# Patient Record
Sex: FEMALE | Race: WHITE | Employment: OTHER | URBAN - METROPOLITAN AREA
[De-identification: names, ages, dates, MRNs, and addresses within clinical notes are randomized per-mention and may not be internally consistent; named-entity substitution may affect disease eponyms.]

---

## 2017-01-26 RX ORDER — ALLOPURINOL 100 MG/1
TABLET ORAL
Qty: 30 TABLET | Refills: 2 | Status: SHIPPED | OUTPATIENT
Start: 2017-01-26 | End: 2017-06-27 | Stop reason: SDUPTHER

## 2017-05-26 ENCOUNTER — OFFICE VISIT (OUTPATIENT)
Dept: FAMILY MEDICINE CLINIC | Age: 82
End: 2017-05-26
Payer: MEDICARE

## 2017-05-26 VITALS
BODY MASS INDEX: 28.51 KG/M2 | HEIGHT: 61 IN | DIASTOLIC BLOOD PRESSURE: 70 MMHG | SYSTOLIC BLOOD PRESSURE: 122 MMHG | HEART RATE: 104 BPM | TEMPERATURE: 98.1 F | WEIGHT: 151 LBS

## 2017-05-26 DIAGNOSIS — E78.5 HYPERLIPIDEMIA, UNSPECIFIED HYPERLIPIDEMIA TYPE: ICD-10-CM

## 2017-05-26 DIAGNOSIS — Z91.81 AT HIGH RISK FOR FALLS: ICD-10-CM

## 2017-05-26 DIAGNOSIS — M1A.0720 IDIOPATHIC CHRONIC GOUT OF LEFT FOOT WITHOUT TOPHUS: ICD-10-CM

## 2017-05-26 DIAGNOSIS — I10 ESSENTIAL HYPERTENSION: Primary | ICD-10-CM

## 2017-05-26 PROCEDURE — 1123F ACP DISCUSS/DSCN MKR DOCD: CPT | Performed by: FAMILY MEDICINE

## 2017-05-26 PROCEDURE — G8427 DOCREV CUR MEDS BY ELIG CLIN: HCPCS | Performed by: FAMILY MEDICINE

## 2017-05-26 PROCEDURE — 1090F PRES/ABSN URINE INCON ASSESS: CPT | Performed by: FAMILY MEDICINE

## 2017-05-26 PROCEDURE — 4040F PNEUMOC VAC/ADMIN/RCVD: CPT | Performed by: FAMILY MEDICINE

## 2017-05-26 PROCEDURE — G8420 CALC BMI NORM PARAMETERS: HCPCS | Performed by: FAMILY MEDICINE

## 2017-05-26 PROCEDURE — 99213 OFFICE O/P EST LOW 20 MIN: CPT | Performed by: FAMILY MEDICINE

## 2017-05-26 PROCEDURE — G8399 PT W/DXA RESULTS DOCUMENT: HCPCS | Performed by: FAMILY MEDICINE

## 2017-05-26 PROCEDURE — 1036F TOBACCO NON-USER: CPT | Performed by: FAMILY MEDICINE

## 2017-05-26 ASSESSMENT — PATIENT HEALTH QUESTIONNAIRE - PHQ9
SUM OF ALL RESPONSES TO PHQ9 QUESTIONS 1 & 2: 0
SUM OF ALL RESPONSES TO PHQ QUESTIONS 1-9: 0
1. LITTLE INTEREST OR PLEASURE IN DOING THINGS: 0
2. FEELING DOWN, DEPRESSED OR HOPELESS: 0

## 2017-06-19 ENCOUNTER — TELEPHONE (OUTPATIENT)
Dept: FAMILY MEDICINE CLINIC | Age: 82
End: 2017-06-19

## 2017-06-27 RX ORDER — SPIRONOLACTONE 25 MG/1
25 TABLET ORAL DAILY
Qty: 90 TABLET | Refills: 1 | Status: ON HOLD | OUTPATIENT
Start: 2017-06-27 | End: 2017-09-01 | Stop reason: HOSPADM

## 2017-06-27 RX ORDER — LOSARTAN POTASSIUM 100 MG/1
100 TABLET ORAL DAILY
Qty: 90 TABLET | Refills: 1 | Status: ON HOLD | OUTPATIENT
Start: 2017-06-27 | End: 2017-09-01 | Stop reason: HOSPADM

## 2017-06-27 RX ORDER — ATORVASTATIN CALCIUM 20 MG/1
20 TABLET, FILM COATED ORAL DAILY
Qty: 90 TABLET | Refills: 1 | Status: SHIPPED | OUTPATIENT
Start: 2017-06-27 | End: 2017-11-08 | Stop reason: SDUPTHER

## 2017-06-27 RX ORDER — OYSTER SHELL CALCIUM WITH VITAMIN D 500; 200 MG/1; [IU]/1
1 TABLET, FILM COATED ORAL DAILY
Qty: 90 TABLET | Refills: 1 | Status: SHIPPED | OUTPATIENT
Start: 2017-06-27 | End: 2017-09-15 | Stop reason: ALTCHOICE

## 2017-06-27 RX ORDER — FUROSEMIDE 20 MG/1
20 TABLET ORAL 2 TIMES DAILY
Qty: 180 TABLET | Refills: 1 | Status: ON HOLD | OUTPATIENT
Start: 2017-06-27 | End: 2017-09-01 | Stop reason: HOSPADM

## 2017-06-27 RX ORDER — ALLOPURINOL 100 MG/1
TABLET ORAL
Qty: 90 TABLET | Refills: 1 | Status: SHIPPED | OUTPATIENT
Start: 2017-06-27 | End: 2017-11-08 | Stop reason: SDUPTHER

## 2017-06-27 RX ORDER — NICOTINE POLACRILEX 4 MG/1
20 GUM, CHEWING ORAL DAILY
Qty: 90 TABLET | Refills: 1 | Status: SHIPPED | OUTPATIENT
Start: 2017-06-27 | End: 2017-11-08 | Stop reason: SDUPTHER

## 2017-06-27 RX ORDER — CLONIDINE HYDROCHLORIDE 0.2 MG/1
0.2 TABLET ORAL 2 TIMES DAILY
Qty: 180 TABLET | Refills: 1 | Status: SHIPPED | OUTPATIENT
Start: 2017-06-27 | End: 2017-11-08 | Stop reason: SDUPTHER

## 2017-08-26 ENCOUNTER — HOSPITAL ENCOUNTER (OUTPATIENT)
Age: 82
Discharge: HOME | DRG: 683 | End: 2017-08-26
Payer: MEDICARE

## 2017-08-26 DIAGNOSIS — M1A.0720 IDIOPATHIC CHRONIC GOUT OF LEFT FOOT WITHOUT TOPHUS: ICD-10-CM

## 2017-08-26 DIAGNOSIS — I10 ESSENTIAL HYPERTENSION: ICD-10-CM

## 2017-08-26 DIAGNOSIS — E78.5 HYPERLIPIDEMIA, UNSPECIFIED HYPERLIPIDEMIA TYPE: ICD-10-CM

## 2017-08-26 LAB
ALBUMIN SERPL-MCNC: 4.5 G/DL (ref 3.5–5.2)
ALBUMIN/GLOBULIN RATIO: ABNORMAL (ref 1–2.5)
ALP BLD-CCNC: 81 U/L (ref 35–104)
ALT SERPL-CCNC: 16 U/L (ref 5–33)
ANION GAP SERPL CALCULATED.3IONS-SCNC: 29 MMOL/L (ref 9–17)
AST SERPL-CCNC: 29 U/L
BILIRUB SERPL-MCNC: 0.65 MG/DL (ref 0.3–1.2)
BUN BLDV-MCNC: 80 MG/DL (ref 8–23)
BUN/CREAT BLD: ABNORMAL (ref 9–20)
CALCIUM SERPL-MCNC: 10.2 MG/DL (ref 8.6–10.4)
CHLORIDE BLD-SCNC: 94 MMOL/L (ref 98–107)
CHOLESTEROL/HDL RATIO: 1.4
CHOLESTEROL: 147 MG/DL
CO2: 15 MMOL/L (ref 20–31)
CREAT SERPL-MCNC: 4.75 MG/DL (ref 0.5–0.9)
GFR AFRICAN AMERICAN: 11 ML/MIN
GFR NON-AFRICAN AMERICAN: 9 ML/MIN
GFR SERPL CREATININE-BSD FRML MDRD: ABNORMAL ML/MIN/{1.73_M2}
GFR SERPL CREATININE-BSD FRML MDRD: ABNORMAL ML/MIN/{1.73_M2}
GLUCOSE BLD-MCNC: 125 MG/DL (ref 70–99)
HDLC SERPL-MCNC: 107 MG/DL
LDL CHOLESTEROL: 24 MG/DL (ref 0–130)
POTASSIUM SERPL-SCNC: 3.7 MMOL/L (ref 3.7–5.3)
SODIUM BLD-SCNC: 138 MMOL/L (ref 135–144)
TOTAL PROTEIN: 7.4 G/DL (ref 6.4–8.3)
TRIGL SERPL-MCNC: 82 MG/DL
URIC ACID: 3.8 MG/DL (ref 2.4–5.7)
VLDLC SERPL CALC-MCNC: NORMAL MG/DL (ref 1–30)

## 2017-08-26 PROCEDURE — 84550 ASSAY OF BLOOD/URIC ACID: CPT

## 2017-08-26 PROCEDURE — 80061 LIPID PANEL: CPT

## 2017-08-26 PROCEDURE — 80053 COMPREHEN METABOLIC PANEL: CPT

## 2017-08-26 PROCEDURE — 36415 COLL VENOUS BLD VENIPUNCTURE: CPT

## 2017-08-28 ENCOUNTER — HOSPITAL ENCOUNTER (INPATIENT)
Age: 82
LOS: 4 days | Discharge: HOME | DRG: 683 | End: 2017-09-01
Attending: EMERGENCY MEDICINE | Admitting: INTERNAL MEDICINE
Payer: MEDICARE

## 2017-08-28 ENCOUNTER — TELEPHONE (OUTPATIENT)
Dept: FAMILY MEDICINE CLINIC | Age: 82
End: 2017-08-28

## 2017-08-28 DIAGNOSIS — N17.9 ACUTE KIDNEY INJURY (HCC): Primary | ICD-10-CM

## 2017-08-28 LAB
-: ABNORMAL
ABSOLUTE EOS #: 0 K/UL (ref 0–0.4)
ABSOLUTE LYMPH #: 0.8 K/UL (ref 1–4.8)
ABSOLUTE MONO #: 0.4 K/UL (ref 0.1–1.3)
AMORPHOUS: ABNORMAL
ANION GAP SERPL CALCULATED.3IONS-SCNC: 25 MMOL/L (ref 9–17)
BACTERIA: ABNORMAL
BASOPHILS # BLD: 1 %
BASOPHILS ABSOLUTE: 0 K/UL (ref 0–0.2)
BILIRUBIN URINE: NEGATIVE
BUN BLDV-MCNC: 91 MG/DL (ref 8–23)
BUN/CREAT BLD: ABNORMAL (ref 9–20)
CALCIUM IONIZED: 1.37 MMOL/L (ref 1.13–1.33)
CALCIUM SERPL-MCNC: 10.7 MG/DL (ref 8.6–10.4)
CASTS UA: ABNORMAL /LPF
CHLORIDE BLD-SCNC: 98 MMOL/L (ref 98–107)
CHLORIDE, UR: 77 MMOL/L
CO2: 15 MMOL/L (ref 20–31)
COLOR: YELLOW
COMMENT UA: ABNORMAL
CREAT SERPL-MCNC: 5.63 MG/DL (ref 0.5–0.9)
CREATININE URINE: 32.3 MG/DL (ref 28–217)
CRYSTALS, UA: ABNORMAL /HPF
DIFFERENTIAL TYPE: ABNORMAL
EOSINOPHIL,URINE: NORMAL
EOSINOPHILS RELATIVE PERCENT: 1 %
EPITHELIAL CELLS UA: ABNORMAL /HPF
FREE KAPPA/LAMBDA RATIO: 1 (ref 0.26–1.65)
GFR AFRICAN AMERICAN: 9 ML/MIN
GFR NON-AFRICAN AMERICAN: 7 ML/MIN
GFR SERPL CREATININE-BSD FRML MDRD: ABNORMAL ML/MIN/{1.73_M2}
GFR SERPL CREATININE-BSD FRML MDRD: ABNORMAL ML/MIN/{1.73_M2}
GLUCOSE BLD-MCNC: 96 MG/DL (ref 70–99)
GLUCOSE URINE: NEGATIVE
HCT VFR BLD CALC: 32.2 % (ref 36–46)
HEMOGLOBIN: 10.6 G/DL (ref 12–16)
KAPPA FREE LIGHT CHAINS QNT: 4.08 MG/DL (ref 0.37–1.94)
KETONES, URINE: NEGATIVE
LAMBDA FREE LIGHT CHAINS QNT: 4.1 MG/DL (ref 0.57–2.63)
LEUKOCYTE ESTERASE, URINE: ABNORMAL
LYMPHOCYTES # BLD: 17 %
MCH RBC QN AUTO: 32.9 PG (ref 26–34)
MCHC RBC AUTO-ENTMCNC: 32.9 G/DL (ref 31–37)
MCV RBC AUTO: 99.9 FL (ref 80–100)
MONOCYTES # BLD: 9 %
MUCUS: ABNORMAL
NITRITE, URINE: NEGATIVE
OTHER OBSERVATIONS UA: ABNORMAL
PDW BLD-RTO: 17.2 % (ref 11.5–14.9)
PH UA: 6 (ref 5–8)
PLATELET # BLD: 181 K/UL (ref 150–450)
PLATELET ESTIMATE: ABNORMAL
PMV BLD AUTO: 9.4 FL (ref 6–12)
POTASSIUM SERPL-SCNC: 5.1 MMOL/L (ref 3.7–5.3)
PROTEIN UA: NEGATIVE
PTH INTACT: 39.76 PG/ML (ref 15–65)
RBC # BLD: 3.23 M/UL (ref 4–5.2)
RBC # BLD: ABNORMAL 10*6/UL
RBC UA: ABNORMAL /HPF
RENAL EPITHELIAL, UA: ABNORMAL /HPF
SEG NEUTROPHILS: 72 %
SEGMENTED NEUTROPHILS ABSOLUTE COUNT: 3.6 K/UL (ref 1.3–9.1)
SODIUM BLD-SCNC: 138 MMOL/L (ref 135–144)
SODIUM,UR: 94 MMOL/L
SPECIFIC GRAVITY UA: 1.01 (ref 1–1.03)
TOTAL PROTEIN, URINE: 6 MG/DL
TRICHOMONAS: ABNORMAL
TURBIDITY: CLEAR
URINE HGB: NEGATIVE
URINE TOTAL PROTEIN CREATININE RATIO: 0.19 (ref 0–0.2)
UROBILINOGEN, URINE: NORMAL
VITAMIN D 25-HYDROXY: 77.4 NG/ML (ref 30–100)
WBC # BLD: 4.9 K/UL (ref 3.5–11)
WBC # BLD: ABNORMAL 10*3/UL
WBC UA: ABNORMAL /HPF
YEAST: ABNORMAL

## 2017-08-28 PROCEDURE — 84156 ASSAY OF PROTEIN URINE: CPT

## 2017-08-28 PROCEDURE — 84300 ASSAY OF URINE SODIUM: CPT

## 2017-08-28 PROCEDURE — 99284 EMERGENCY DEPT VISIT MOD MDM: CPT

## 2017-08-28 PROCEDURE — 84165 PROTEIN E-PHORESIS SERUM: CPT

## 2017-08-28 PROCEDURE — 36415 COLL VENOUS BLD VENIPUNCTURE: CPT

## 2017-08-28 PROCEDURE — 84166 PROTEIN E-PHORESIS/URINE/CSF: CPT

## 2017-08-28 PROCEDURE — 99223 1ST HOSP IP/OBS HIGH 75: CPT | Performed by: INTERNAL MEDICINE

## 2017-08-28 PROCEDURE — 2580000003 HC RX 258: Performed by: INTERNAL MEDICINE

## 2017-08-28 PROCEDURE — 2580000003 HC RX 258: Performed by: EMERGENCY MEDICINE

## 2017-08-28 PROCEDURE — 82330 ASSAY OF CALCIUM: CPT

## 2017-08-28 PROCEDURE — 82570 ASSAY OF URINE CREATININE: CPT

## 2017-08-28 PROCEDURE — 80048 BASIC METABOLIC PNL TOTAL CA: CPT

## 2017-08-28 PROCEDURE — 82306 VITAMIN D 25 HYDROXY: CPT

## 2017-08-28 PROCEDURE — 82436 ASSAY OF URINE CHLORIDE: CPT

## 2017-08-28 PROCEDURE — 83970 ASSAY OF PARATHORMONE: CPT

## 2017-08-28 PROCEDURE — 85025 COMPLETE CBC W/AUTO DIFF WBC: CPT

## 2017-08-28 PROCEDURE — 2060000000 HC ICU INTERMEDIATE R&B

## 2017-08-28 PROCEDURE — 84155 ASSAY OF PROTEIN SERUM: CPT

## 2017-08-28 PROCEDURE — 83883 ASSAY NEPHELOMETRY NOT SPEC: CPT

## 2017-08-28 PROCEDURE — 87205 SMEAR GRAM STAIN: CPT

## 2017-08-28 PROCEDURE — 81001 URINALYSIS AUTO W/SCOPE: CPT

## 2017-08-28 RX ORDER — BISACODYL 10 MG
10 SUPPOSITORY, RECTAL RECTAL DAILY PRN
Status: DISCONTINUED | OUTPATIENT
Start: 2017-08-28 | End: 2017-09-01 | Stop reason: HOSPADM

## 2017-08-28 RX ORDER — SODIUM CHLORIDE 450 MG/100ML
INJECTION, SOLUTION INTRAVENOUS CONTINUOUS
Status: DISCONTINUED | OUTPATIENT
Start: 2017-08-28 | End: 2017-08-29

## 2017-08-28 RX ORDER — 0.9 % SODIUM CHLORIDE 0.9 %
1000 INTRAVENOUS SOLUTION INTRAVENOUS ONCE
Status: COMPLETED | OUTPATIENT
Start: 2017-08-28 | End: 2017-08-28

## 2017-08-28 RX ORDER — SODIUM CHLORIDE 9 MG/ML
INJECTION, SOLUTION INTRAVENOUS CONTINUOUS
Status: DISCONTINUED | OUTPATIENT
Start: 2017-08-28 | End: 2017-08-29

## 2017-08-28 RX ORDER — SODIUM CHLORIDE 0.9 % (FLUSH) 0.9 %
10 SYRINGE (ML) INJECTION PRN
Status: DISCONTINUED | OUTPATIENT
Start: 2017-08-28 | End: 2017-09-01 | Stop reason: HOSPADM

## 2017-08-28 RX ORDER — DOCUSATE SODIUM 100 MG/1
100 CAPSULE, LIQUID FILLED ORAL 2 TIMES DAILY
Status: DISCONTINUED | OUTPATIENT
Start: 2017-08-28 | End: 2017-09-01 | Stop reason: HOSPADM

## 2017-08-28 RX ORDER — HEPARIN SODIUM 5000 [USP'U]/ML
5000 INJECTION, SOLUTION INTRAVENOUS; SUBCUTANEOUS EVERY 8 HOURS SCHEDULED
Status: DISCONTINUED | OUTPATIENT
Start: 2017-08-28 | End: 2017-09-01 | Stop reason: HOSPADM

## 2017-08-28 RX ORDER — SODIUM CHLORIDE 0.9 % (FLUSH) 0.9 %
10 SYRINGE (ML) INJECTION EVERY 12 HOURS SCHEDULED
Status: DISCONTINUED | OUTPATIENT
Start: 2017-08-28 | End: 2017-09-01 | Stop reason: HOSPADM

## 2017-08-28 RX ORDER — ONDANSETRON 2 MG/ML
4 INJECTION INTRAMUSCULAR; INTRAVENOUS EVERY 6 HOURS PRN
Status: DISCONTINUED | OUTPATIENT
Start: 2017-08-28 | End: 2017-09-01 | Stop reason: HOSPADM

## 2017-08-28 RX ORDER — CLONIDINE HYDROCHLORIDE 0.1 MG/1
0.2 TABLET ORAL 2 TIMES DAILY
Status: DISCONTINUED | OUTPATIENT
Start: 2017-08-28 | End: 2017-09-01 | Stop reason: HOSPADM

## 2017-08-28 RX ORDER — ACETAMINOPHEN 325 MG/1
650 TABLET ORAL EVERY 4 HOURS PRN
Status: DISCONTINUED | OUTPATIENT
Start: 2017-08-28 | End: 2017-09-01 | Stop reason: HOSPADM

## 2017-08-28 RX ORDER — M-VIT,TX,IRON,MINS/CALC/FOLIC 27MG-0.4MG
1 TABLET ORAL DAILY
Status: ON HOLD | COMMUNITY
End: 2017-09-01 | Stop reason: HOSPADM

## 2017-08-28 RX ADMIN — SODIUM CHLORIDE: 9 INJECTION, SOLUTION INTRAVENOUS at 18:58

## 2017-08-28 RX ADMIN — SODIUM CHLORIDE 1000 ML: 9 INJECTION, SOLUTION INTRAVENOUS at 16:30

## 2017-08-28 ASSESSMENT — PAIN SCALES - GENERAL
PAINLEVEL_OUTOF10: 0
PAINLEVEL_OUTOF10: 0

## 2017-08-28 ASSESSMENT — PAIN SCALES - WONG BAKER: WONGBAKER_NUMERICALRESPONSE: 0

## 2017-08-29 ENCOUNTER — APPOINTMENT (OUTPATIENT)
Dept: ULTRASOUND IMAGING | Age: 82
DRG: 683 | End: 2017-08-29
Payer: MEDICARE

## 2017-08-29 LAB
ANION GAP SERPL CALCULATED.3IONS-SCNC: 20 MMOL/L (ref 9–17)
BUN BLDV-MCNC: 78 MG/DL (ref 8–23)
BUN/CREAT BLD: ABNORMAL (ref 9–20)
CALCIUM SERPL-MCNC: 10 MG/DL (ref 8.6–10.4)
CHLORIDE BLD-SCNC: 105 MMOL/L (ref 98–107)
CO2: 17 MMOL/L (ref 20–31)
COMPLEMENT C3: 92 MG/DL (ref 90–180)
COMPLEMENT C4: 25 MG/DL (ref 10–40)
CREAT SERPL-MCNC: 4.4 MG/DL (ref 0.5–0.9)
FERRITIN: 204 UG/L (ref 13–150)
GFR AFRICAN AMERICAN: 12 ML/MIN
GFR NON-AFRICAN AMERICAN: 10 ML/MIN
GFR SERPL CREATININE-BSD FRML MDRD: ABNORMAL ML/MIN/{1.73_M2}
GFR SERPL CREATININE-BSD FRML MDRD: ABNORMAL ML/MIN/{1.73_M2}
GLUCOSE BLD-MCNC: 78 MG/DL (ref 70–99)
IRON SATURATION: 40 % (ref 20–55)
IRON: 95 UG/DL (ref 37–145)
MAGNESIUM: 1.7 MG/DL (ref 1.6–2.6)
PHOSPHORUS: 4.9 MG/DL (ref 2.6–4.5)
POTASSIUM SERPL-SCNC: 4.9 MMOL/L (ref 3.7–5.3)
SODIUM BLD-SCNC: 142 MMOL/L (ref 135–144)
TOTAL CK: 164 U/L (ref 26–192)
TOTAL IRON BINDING CAPACITY: 237 UG/DL (ref 250–450)
UNSATURATED IRON BINDING CAPACITY: 142 UG/DL (ref 112–347)

## 2017-08-29 PROCEDURE — 82550 ASSAY OF CK (CPK): CPT

## 2017-08-29 PROCEDURE — 99232 SBSQ HOSP IP/OBS MODERATE 35: CPT | Performed by: INTERNAL MEDICINE

## 2017-08-29 PROCEDURE — 2580000003 HC RX 258: Performed by: INTERNAL MEDICINE

## 2017-08-29 PROCEDURE — 83540 ASSAY OF IRON: CPT

## 2017-08-29 PROCEDURE — 2500000003 HC RX 250 WO HCPCS: Performed by: INTERNAL MEDICINE

## 2017-08-29 PROCEDURE — 36415 COLL VENOUS BLD VENIPUNCTURE: CPT

## 2017-08-29 PROCEDURE — 6370000000 HC RX 637 (ALT 250 FOR IP): Performed by: NURSE PRACTITIONER

## 2017-08-29 PROCEDURE — 82728 ASSAY OF FERRITIN: CPT

## 2017-08-29 PROCEDURE — 2060000000 HC ICU INTERMEDIATE R&B

## 2017-08-29 PROCEDURE — 76770 US EXAM ABDO BACK WALL COMP: CPT

## 2017-08-29 PROCEDURE — 6360000002 HC RX W HCPCS: Performed by: NURSE PRACTITIONER

## 2017-08-29 PROCEDURE — 83550 IRON BINDING TEST: CPT

## 2017-08-29 PROCEDURE — 83735 ASSAY OF MAGNESIUM: CPT

## 2017-08-29 PROCEDURE — 84100 ASSAY OF PHOSPHORUS: CPT

## 2017-08-29 PROCEDURE — 80048 BASIC METABOLIC PNL TOTAL CA: CPT

## 2017-08-29 PROCEDURE — 86160 COMPLEMENT ANTIGEN: CPT

## 2017-08-29 RX ADMIN — HEPARIN SODIUM 5000 UNITS: 5000 INJECTION, SOLUTION INTRAVENOUS; SUBCUTANEOUS at 05:58

## 2017-08-29 RX ADMIN — SODIUM BICARBONATE: 84 INJECTION, SOLUTION INTRAVENOUS at 11:25

## 2017-08-29 RX ADMIN — CLONIDINE HYDROCHLORIDE 0.2 MG: 0.1 TABLET ORAL at 00:09

## 2017-08-29 RX ADMIN — CLONIDINE HYDROCHLORIDE 0.2 MG: 0.1 TABLET ORAL at 20:13

## 2017-08-29 RX ADMIN — SODIUM BICARBONATE: 84 INJECTION, SOLUTION INTRAVENOUS at 20:08

## 2017-08-29 RX ADMIN — SODIUM CHLORIDE: 9 INJECTION, SOLUTION INTRAVENOUS at 03:06

## 2017-08-29 RX ADMIN — HEPARIN SODIUM 5000 UNITS: 5000 INJECTION, SOLUTION INTRAVENOUS; SUBCUTANEOUS at 00:09

## 2017-08-29 RX ADMIN — HEPARIN SODIUM 5000 UNITS: 5000 INJECTION, SOLUTION INTRAVENOUS; SUBCUTANEOUS at 13:01

## 2017-08-29 RX ADMIN — HEPARIN SODIUM 5000 UNITS: 5000 INJECTION, SOLUTION INTRAVENOUS; SUBCUTANEOUS at 20:15

## 2017-08-29 RX ADMIN — CLONIDINE HYDROCHLORIDE 0.2 MG: 0.1 TABLET ORAL at 09:22

## 2017-08-29 RX ADMIN — Medication 10 ML: at 21:17

## 2017-08-30 LAB
ABSOLUTE EOS #: 0.22 K/UL (ref 0–0.4)
ABSOLUTE LYMPH #: 1.55 K/UL (ref 1–4.8)
ABSOLUTE MONO #: 0.47 K/UL (ref 0.1–1.3)
ALBUMIN (CALCULATED): 4.6 G/DL (ref 3.2–5.2)
ALBUMIN PERCENT: 69 % (ref 45–65)
ALPHA 1 PERCENT: 3 % (ref 3–6)
ALPHA 2 PERCENT: 10 % (ref 6–13)
ALPHA-1-GLOBULIN: 0.2 G/DL (ref 0.1–0.4)
ALPHA-2-GLOBULIN: 0.7 G/DL (ref 0.5–0.9)
ANION GAP SERPL CALCULATED.3IONS-SCNC: 16 MMOL/L (ref 9–17)
BASOPHILS # BLD: 0 %
BASOPHILS ABSOLUTE: 0 K/UL (ref 0–0.2)
BETA GLOBULIN: 0.6 G/DL (ref 0.5–1.1)
BETA PERCENT: 9 % (ref 11–19)
BUN BLDV-MCNC: 56 MG/DL (ref 8–23)
BUN/CREAT BLD: ABNORMAL (ref 9–20)
CALCIUM SERPL-MCNC: 9.2 MG/DL (ref 8.6–10.4)
CHLORIDE BLD-SCNC: 95 MMOL/L (ref 98–107)
CO2: 22 MMOL/L (ref 20–31)
CREAT SERPL-MCNC: 3.37 MG/DL (ref 0.5–0.9)
DIFFERENTIAL TYPE: ABNORMAL
EOSINOPHILS RELATIVE PERCENT: 5 %
GAMMA GLOBULIN %: 9 % (ref 9–20)
GAMMA GLOBULIN: 0.6 G/DL (ref 0.5–1.5)
GFR AFRICAN AMERICAN: 16 ML/MIN
GFR NON-AFRICAN AMERICAN: 13 ML/MIN
GFR SERPL CREATININE-BSD FRML MDRD: ABNORMAL ML/MIN/{1.73_M2}
GFR SERPL CREATININE-BSD FRML MDRD: ABNORMAL ML/MIN/{1.73_M2}
GLUCOSE BLD-MCNC: 110 MG/DL (ref 70–99)
HCT VFR BLD CALC: 27.9 % (ref 36–46)
HEMOGLOBIN: 9.3 G/DL (ref 12–16)
LYMPHOCYTES # BLD: 36 %
MCH RBC QN AUTO: 33.3 PG (ref 26–34)
MCHC RBC AUTO-ENTMCNC: 33.3 G/DL (ref 31–37)
MCV RBC AUTO: 100.1 FL (ref 80–100)
MONOCYTES # BLD: 11 %
MORPHOLOGY: ABNORMAL
P E INTERPRETATION, U: NORMAL
PATHOLOGIST: ABNORMAL
PATHOLOGIST: NORMAL
PDW BLD-RTO: 16.9 % (ref 11.5–14.9)
PLATELET # BLD: 165 K/UL (ref 150–450)
PLATELET ESTIMATE: ABNORMAL
PMV BLD AUTO: 8.8 FL (ref 6–12)
POTASSIUM SERPL-SCNC: 4.1 MMOL/L (ref 3.7–5.3)
PROTEIN ELECTROPHORESIS, SERUM: ABNORMAL
RBC # BLD: 2.78 M/UL (ref 4–5.2)
RBC # BLD: ABNORMAL 10*6/UL
SEG NEUTROPHILS: 48 %
SEGMENTED NEUTROPHILS ABSOLUTE COUNT: 2.06 K/UL (ref 1.3–9.1)
SODIUM BLD-SCNC: 133 MMOL/L (ref 135–144)
SPECIMEN TYPE: NORMAL
TOTAL PROT. SUM,%: 100 % (ref 98–102)
TOTAL PROT. SUM: 6.7 G/DL (ref 6.3–8.2)
TOTAL PROTEIN: 6.7 G/DL (ref 6.4–8.3)
URINE TOTAL PROTEIN: 5 MG/DL
WBC # BLD: 4.3 K/UL (ref 3.5–11)
WBC # BLD: ABNORMAL 10*3/UL

## 2017-08-30 PROCEDURE — 85025 COMPLETE CBC W/AUTO DIFF WBC: CPT

## 2017-08-30 PROCEDURE — 2580000003 HC RX 258: Performed by: INTERNAL MEDICINE

## 2017-08-30 PROCEDURE — 6370000000 HC RX 637 (ALT 250 FOR IP): Performed by: NURSE PRACTITIONER

## 2017-08-30 PROCEDURE — 6360000002 HC RX W HCPCS: Performed by: NURSE PRACTITIONER

## 2017-08-30 PROCEDURE — 2060000000 HC ICU INTERMEDIATE R&B

## 2017-08-30 PROCEDURE — 80048 BASIC METABOLIC PNL TOTAL CA: CPT

## 2017-08-30 PROCEDURE — 2500000003 HC RX 250 WO HCPCS: Performed by: INTERNAL MEDICINE

## 2017-08-30 PROCEDURE — 99232 SBSQ HOSP IP/OBS MODERATE 35: CPT | Performed by: INTERNAL MEDICINE

## 2017-08-30 PROCEDURE — 36415 COLL VENOUS BLD VENIPUNCTURE: CPT

## 2017-08-30 RX ORDER — SODIUM CHLORIDE 9 MG/ML
INJECTION, SOLUTION INTRAVENOUS CONTINUOUS
Status: DISCONTINUED | OUTPATIENT
Start: 2017-08-30 | End: 2017-09-01 | Stop reason: HOSPADM

## 2017-08-30 RX ADMIN — HEPARIN SODIUM 5000 UNITS: 5000 INJECTION, SOLUTION INTRAVENOUS; SUBCUTANEOUS at 14:09

## 2017-08-30 RX ADMIN — SODIUM CHLORIDE: 9 INJECTION, SOLUTION INTRAVENOUS at 16:00

## 2017-08-30 RX ADMIN — CLONIDINE HYDROCHLORIDE 0.2 MG: 0.1 TABLET ORAL at 09:21

## 2017-08-30 RX ADMIN — HEPARIN SODIUM 5000 UNITS: 5000 INJECTION, SOLUTION INTRAVENOUS; SUBCUTANEOUS at 20:23

## 2017-08-30 RX ADMIN — SODIUM BICARBONATE: 84 INJECTION, SOLUTION INTRAVENOUS at 04:38

## 2017-08-30 RX ADMIN — HEPARIN SODIUM 5000 UNITS: 5000 INJECTION, SOLUTION INTRAVENOUS; SUBCUTANEOUS at 06:05

## 2017-08-30 RX ADMIN — SODIUM BICARBONATE: 84 INJECTION, SOLUTION INTRAVENOUS at 14:10

## 2017-08-30 RX ADMIN — CLONIDINE HYDROCHLORIDE 0.2 MG: 0.1 TABLET ORAL at 20:22

## 2017-08-31 ENCOUNTER — CARE COORDINATOR VISIT (OUTPATIENT)
Dept: CASE MANAGEMENT | Age: 82
End: 2017-08-31

## 2017-08-31 LAB
ABSOLUTE EOS #: 0.3 K/UL (ref 0–0.4)
ABSOLUTE LYMPH #: 2.11 K/UL (ref 1–4.8)
ABSOLUTE MONO #: 0.43 K/UL (ref 0.1–1.3)
ANION GAP SERPL CALCULATED.3IONS-SCNC: 14 MMOL/L (ref 9–17)
BASOPHILS # BLD: 0 %
BASOPHILS ABSOLUTE: 0 K/UL (ref 0–0.2)
BUN BLDV-MCNC: 41 MG/DL (ref 8–23)
BUN/CREAT BLD: ABNORMAL (ref 9–20)
CALCIUM SERPL-MCNC: 8.1 MG/DL (ref 8.6–10.4)
CHLORIDE BLD-SCNC: 96 MMOL/L (ref 98–107)
CO2: 20 MMOL/L (ref 20–31)
CREAT SERPL-MCNC: 2.71 MG/DL (ref 0.5–0.9)
DIFFERENTIAL TYPE: ABNORMAL
EOSINOPHILS RELATIVE PERCENT: 7 %
GFR AFRICAN AMERICAN: 20 ML/MIN
GFR NON-AFRICAN AMERICAN: 17 ML/MIN
GFR SERPL CREATININE-BSD FRML MDRD: ABNORMAL ML/MIN/{1.73_M2}
GFR SERPL CREATININE-BSD FRML MDRD: ABNORMAL ML/MIN/{1.73_M2}
GLUCOSE BLD-MCNC: 88 MG/DL (ref 70–99)
HCT VFR BLD CALC: 26.8 % (ref 36–46)
HEMOGLOBIN: 8.8 G/DL (ref 12–16)
LYMPHOCYTES # BLD: 49 %
MCH RBC QN AUTO: 33.2 PG (ref 26–34)
MCHC RBC AUTO-ENTMCNC: 32.8 G/DL (ref 31–37)
MCV RBC AUTO: 101.3 FL (ref 80–100)
MONOCYTES # BLD: 10 %
MORPHOLOGY: ABNORMAL
OSMOLALITY URINE: 164 MOSM/KG (ref 80–1300)
PDW BLD-RTO: 16.8 % (ref 11.5–14.9)
PLATELET # BLD: 148 K/UL (ref 150–450)
PLATELET ESTIMATE: ABNORMAL
PMV BLD AUTO: 9.8 FL (ref 6–12)
POTASSIUM SERPL-SCNC: 3.7 MMOL/L (ref 3.7–5.3)
RBC # BLD: 2.65 M/UL (ref 4–5.2)
RBC # BLD: ABNORMAL 10*6/UL
SEG NEUTROPHILS: 34 %
SEGMENTED NEUTROPHILS ABSOLUTE COUNT: 1.46 K/UL (ref 1.3–9.1)
SERUM OSMOLALITY: 278 MOSM/KG (ref 275–295)
SODIUM BLD-SCNC: 130 MMOL/L (ref 135–144)
WBC # BLD: 4.3 K/UL (ref 3.5–11)
WBC # BLD: ABNORMAL 10*3/UL

## 2017-08-31 PROCEDURE — 6360000002 HC RX W HCPCS: Performed by: NURSE PRACTITIONER

## 2017-08-31 PROCEDURE — 85025 COMPLETE CBC W/AUTO DIFF WBC: CPT

## 2017-08-31 PROCEDURE — 83935 ASSAY OF URINE OSMOLALITY: CPT

## 2017-08-31 PROCEDURE — 36415 COLL VENOUS BLD VENIPUNCTURE: CPT

## 2017-08-31 PROCEDURE — 2580000003 HC RX 258: Performed by: INTERNAL MEDICINE

## 2017-08-31 PROCEDURE — 6370000000 HC RX 637 (ALT 250 FOR IP): Performed by: NURSE PRACTITIONER

## 2017-08-31 PROCEDURE — 2060000000 HC ICU INTERMEDIATE R&B

## 2017-08-31 PROCEDURE — 80048 BASIC METABOLIC PNL TOTAL CA: CPT

## 2017-08-31 PROCEDURE — 83930 ASSAY OF BLOOD OSMOLALITY: CPT

## 2017-08-31 RX ADMIN — HEPARIN SODIUM 5000 UNITS: 5000 INJECTION, SOLUTION INTRAVENOUS; SUBCUTANEOUS at 14:34

## 2017-08-31 RX ADMIN — HEPARIN SODIUM 5000 UNITS: 5000 INJECTION, SOLUTION INTRAVENOUS; SUBCUTANEOUS at 05:59

## 2017-08-31 RX ADMIN — SODIUM CHLORIDE: 9 INJECTION, SOLUTION INTRAVENOUS at 16:31

## 2017-08-31 RX ADMIN — SODIUM CHLORIDE: 9 INJECTION, SOLUTION INTRAVENOUS at 00:12

## 2017-08-31 RX ADMIN — SODIUM CHLORIDE: 9 INJECTION, SOLUTION INTRAVENOUS at 08:24

## 2017-08-31 RX ADMIN — CLONIDINE HYDROCHLORIDE 0.2 MG: 0.1 TABLET ORAL at 20:24

## 2017-08-31 RX ADMIN — HEPARIN SODIUM 5000 UNITS: 5000 INJECTION, SOLUTION INTRAVENOUS; SUBCUTANEOUS at 20:24

## 2017-08-31 RX ADMIN — CLONIDINE HYDROCHLORIDE 0.2 MG: 0.1 TABLET ORAL at 09:19

## 2017-08-31 ASSESSMENT — PAIN SCALES - GENERAL: PAINLEVEL_OUTOF10: 0

## 2017-09-01 ENCOUNTER — CARE COORDINATION (OUTPATIENT)
Dept: CASE MANAGEMENT | Age: 82
End: 2017-09-01

## 2017-09-01 ENCOUNTER — CARE COORDINATOR VISIT (OUTPATIENT)
Dept: CASE MANAGEMENT | Age: 82
End: 2017-09-01

## 2017-09-01 VITALS
RESPIRATION RATE: 16 BRPM | BODY MASS INDEX: 30.51 KG/M2 | WEIGHT: 161.6 LBS | HEART RATE: 84 BPM | DIASTOLIC BLOOD PRESSURE: 85 MMHG | HEIGHT: 61 IN | TEMPERATURE: 97.5 F | SYSTOLIC BLOOD PRESSURE: 132 MMHG | OXYGEN SATURATION: 100 %

## 2017-09-01 LAB
ABSOLUTE EOS #: 0 K/UL (ref 0–0.4)
ABSOLUTE LYMPH #: 2.23 K/UL (ref 1–4.8)
ABSOLUTE MONO #: 0.22 K/UL (ref 0.1–1.3)
ANION GAP SERPL CALCULATED.3IONS-SCNC: 15 MMOL/L (ref 9–17)
BASOPHILS # BLD: 0 %
BASOPHILS ABSOLUTE: 0 K/UL (ref 0–0.2)
BUN BLDV-MCNC: 30 MG/DL (ref 8–23)
BUN/CREAT BLD: ABNORMAL (ref 9–20)
CALCIUM SERPL-MCNC: 7.8 MG/DL (ref 8.6–10.4)
CHLORIDE BLD-SCNC: 102 MMOL/L (ref 98–107)
CO2: 19 MMOL/L (ref 20–31)
CREAT SERPL-MCNC: 1.92 MG/DL (ref 0.5–0.9)
DIFFERENTIAL TYPE: ABNORMAL
EOSINOPHILS RELATIVE PERCENT: 0 %
GFR AFRICAN AMERICAN: 30 ML/MIN
GFR NON-AFRICAN AMERICAN: 25 ML/MIN
GFR SERPL CREATININE-BSD FRML MDRD: ABNORMAL ML/MIN/{1.73_M2}
GFR SERPL CREATININE-BSD FRML MDRD: ABNORMAL ML/MIN/{1.73_M2}
GLUCOSE BLD-MCNC: 84 MG/DL (ref 70–99)
HCT VFR BLD CALC: 26.7 % (ref 36–46)
HEMOGLOBIN: 8.9 G/DL (ref 12–16)
LYMPHOCYTES # BLD: 52 %
MCH RBC QN AUTO: 33.6 PG (ref 26–34)
MCHC RBC AUTO-ENTMCNC: 33.2 G/DL (ref 31–37)
MCV RBC AUTO: 101.2 FL (ref 80–100)
MONOCYTES # BLD: 5 %
MORPHOLOGY: ABNORMAL
PDW BLD-RTO: 16.6 % (ref 11.5–14.9)
PLATELET # BLD: 159 K/UL (ref 150–450)
PLATELET ESTIMATE: ABNORMAL
PMV BLD AUTO: 9.8 FL (ref 6–12)
POTASSIUM SERPL-SCNC: 4.5 MMOL/L (ref 3.7–5.3)
RBC # BLD: 2.64 M/UL (ref 4–5.2)
RBC # BLD: ABNORMAL 10*6/UL
SEG NEUTROPHILS: 43 %
SEGMENTED NEUTROPHILS ABSOLUTE COUNT: 1.85 K/UL (ref 1.3–9.1)
SODIUM BLD-SCNC: 136 MMOL/L (ref 135–144)
WBC # BLD: 4.3 K/UL (ref 3.5–11)
WBC # BLD: ABNORMAL 10*3/UL

## 2017-09-01 PROCEDURE — 36415 COLL VENOUS BLD VENIPUNCTURE: CPT

## 2017-09-01 PROCEDURE — 99239 HOSP IP/OBS DSCHRG MGMT >30: CPT | Performed by: INTERNAL MEDICINE

## 2017-09-01 PROCEDURE — 6360000002 HC RX W HCPCS: Performed by: NURSE PRACTITIONER

## 2017-09-01 PROCEDURE — 6370000000 HC RX 637 (ALT 250 FOR IP): Performed by: NURSE PRACTITIONER

## 2017-09-01 PROCEDURE — 85025 COMPLETE CBC W/AUTO DIFF WBC: CPT

## 2017-09-01 PROCEDURE — 2580000003 HC RX 258: Performed by: INTERNAL MEDICINE

## 2017-09-01 PROCEDURE — 80048 BASIC METABOLIC PNL TOTAL CA: CPT

## 2017-09-01 RX ADMIN — SODIUM CHLORIDE: 9 INJECTION, SOLUTION INTRAVENOUS at 00:27

## 2017-09-01 RX ADMIN — DOCUSATE SODIUM 100 MG: 100 CAPSULE, LIQUID FILLED ORAL at 09:06

## 2017-09-01 RX ADMIN — SODIUM CHLORIDE: 9 INJECTION, SOLUTION INTRAVENOUS at 12:45

## 2017-09-01 RX ADMIN — CLONIDINE HYDROCHLORIDE 0.2 MG: 0.1 TABLET ORAL at 09:06

## 2017-09-01 RX ADMIN — HEPARIN SODIUM 5000 UNITS: 5000 INJECTION, SOLUTION INTRAVENOUS; SUBCUTANEOUS at 15:33

## 2017-09-01 RX ADMIN — HEPARIN SODIUM 5000 UNITS: 5000 INJECTION, SOLUTION INTRAVENOUS; SUBCUTANEOUS at 05:24

## 2017-09-01 RX ADMIN — SODIUM CHLORIDE: 9 INJECTION, SOLUTION INTRAVENOUS at 05:20

## 2017-09-05 ENCOUNTER — HOSPITAL ENCOUNTER (OUTPATIENT)
Age: 82
Discharge: HOME | End: 2017-09-05
Payer: MEDICARE

## 2017-09-05 LAB
ANION GAP SERPL CALCULATED.3IONS-SCNC: 18 MMOL/L (ref 9–17)
BUN BLDV-MCNC: 16 MG/DL (ref 8–23)
BUN/CREAT BLD: ABNORMAL (ref 9–20)
CALCIUM SERPL-MCNC: 8.5 MG/DL (ref 8.6–10.4)
CHLORIDE BLD-SCNC: 100 MMOL/L (ref 98–107)
CO2: 18 MMOL/L (ref 20–31)
CREAT SERPL-MCNC: 1.2 MG/DL (ref 0.5–0.9)
GFR AFRICAN AMERICAN: 52 ML/MIN
GFR NON-AFRICAN AMERICAN: 43 ML/MIN
GFR SERPL CREATININE-BSD FRML MDRD: ABNORMAL ML/MIN/{1.73_M2}
GFR SERPL CREATININE-BSD FRML MDRD: ABNORMAL ML/MIN/{1.73_M2}
GLUCOSE BLD-MCNC: 92 MG/DL (ref 70–99)
POTASSIUM SERPL-SCNC: 3.8 MMOL/L (ref 3.7–5.3)
SODIUM BLD-SCNC: 136 MMOL/L (ref 135–144)

## 2017-09-05 PROCEDURE — 80048 BASIC METABOLIC PNL TOTAL CA: CPT

## 2017-09-05 PROCEDURE — 36415 COLL VENOUS BLD VENIPUNCTURE: CPT

## 2017-09-15 ENCOUNTER — OFFICE VISIT (OUTPATIENT)
Dept: FAMILY MEDICINE CLINIC | Age: 82
End: 2017-09-15
Payer: MEDICARE

## 2017-09-15 VITALS
TEMPERATURE: 97.9 F | HEART RATE: 102 BPM | HEIGHT: 61 IN | WEIGHT: 155.6 LBS | DIASTOLIC BLOOD PRESSURE: 62 MMHG | SYSTOLIC BLOOD PRESSURE: 118 MMHG | BODY MASS INDEX: 29.38 KG/M2

## 2017-09-15 DIAGNOSIS — E78.5 HYPERLIPIDEMIA, UNSPECIFIED HYPERLIPIDEMIA TYPE: ICD-10-CM

## 2017-09-15 DIAGNOSIS — N18.30 CKD (CHRONIC KIDNEY DISEASE) STAGE 3, GFR 30-59 ML/MIN (HCC): ICD-10-CM

## 2017-09-15 DIAGNOSIS — F32.A DEPRESSION, UNSPECIFIED DEPRESSION TYPE: ICD-10-CM

## 2017-09-15 DIAGNOSIS — I10 ESSENTIAL HYPERTENSION: Primary | ICD-10-CM

## 2017-09-15 DIAGNOSIS — Z23 NEED FOR INFLUENZA VACCINATION: ICD-10-CM

## 2017-09-15 PROCEDURE — 1123F ACP DISCUSS/DSCN MKR DOCD: CPT | Performed by: FAMILY MEDICINE

## 2017-09-15 PROCEDURE — 1111F DSCHRG MED/CURRENT MED MERGE: CPT | Performed by: FAMILY MEDICINE

## 2017-09-15 PROCEDURE — 99214 OFFICE O/P EST MOD 30 MIN: CPT | Performed by: FAMILY MEDICINE

## 2017-09-15 PROCEDURE — 1090F PRES/ABSN URINE INCON ASSESS: CPT | Performed by: FAMILY MEDICINE

## 2017-09-15 PROCEDURE — 90688 IIV4 VACCINE SPLT 0.5 ML IM: CPT | Performed by: FAMILY MEDICINE

## 2017-09-15 PROCEDURE — 4040F PNEUMOC VAC/ADMIN/RCVD: CPT | Performed by: FAMILY MEDICINE

## 2017-09-15 PROCEDURE — G0008 ADMIN INFLUENZA VIRUS VAC: HCPCS | Performed by: FAMILY MEDICINE

## 2017-09-15 PROCEDURE — G8427 DOCREV CUR MEDS BY ELIG CLIN: HCPCS | Performed by: FAMILY MEDICINE

## 2017-09-15 PROCEDURE — 1036F TOBACCO NON-USER: CPT | Performed by: FAMILY MEDICINE

## 2017-09-15 PROCEDURE — G8417 CALC BMI ABV UP PARAM F/U: HCPCS | Performed by: FAMILY MEDICINE

## 2017-09-15 PROCEDURE — G8399 PT W/DXA RESULTS DOCUMENT: HCPCS | Performed by: FAMILY MEDICINE

## 2017-09-15 RX ORDER — FUROSEMIDE 20 MG/1
20 TABLET ORAL 2 TIMES DAILY
COMMUNITY
End: 2017-11-08 | Stop reason: SDUPTHER

## 2017-09-15 RX ORDER — LOSARTAN POTASSIUM 100 MG/1
100 TABLET ORAL DAILY
COMMUNITY
End: 2017-11-08 | Stop reason: SDUPTHER

## 2017-09-15 ASSESSMENT — ENCOUNTER SYMPTOMS
NAUSEA: 0
SHORTNESS OF BREATH: 0
CONSTIPATION: 0
SORE THROAT: 0
ABDOMINAL PAIN: 0

## 2017-11-09 RX ORDER — CLONIDINE HYDROCHLORIDE 0.2 MG/1
0.2 TABLET ORAL 2 TIMES DAILY
Qty: 180 TABLET | Refills: 1 | Status: SHIPPED | OUTPATIENT
Start: 2017-11-09 | End: 2018-06-15 | Stop reason: SDUPTHER

## 2017-11-09 RX ORDER — FUROSEMIDE 20 MG/1
20 TABLET ORAL 2 TIMES DAILY
Qty: 180 TABLET | Refills: 1 | Status: SHIPPED | OUTPATIENT
Start: 2017-11-09 | End: 2018-06-15 | Stop reason: SDUPTHER

## 2017-11-09 RX ORDER — ATORVASTATIN CALCIUM 20 MG/1
20 TABLET, FILM COATED ORAL DAILY
Qty: 90 TABLET | Refills: 1 | Status: SHIPPED | OUTPATIENT
Start: 2017-11-09 | End: 2018-06-15 | Stop reason: SDUPTHER

## 2017-11-09 RX ORDER — ALLOPURINOL 100 MG/1
TABLET ORAL
Qty: 90 TABLET | Refills: 1 | Status: SHIPPED | OUTPATIENT
Start: 2017-11-09 | End: 2018-06-15 | Stop reason: SDUPTHER

## 2017-11-09 RX ORDER — HYDRALAZINE HYDROCHLORIDE 25 MG/1
25 TABLET, FILM COATED ORAL 3 TIMES DAILY
Qty: 90 TABLET | Refills: 0 | Status: SHIPPED | OUTPATIENT
Start: 2017-11-09 | End: 2018-01-24 | Stop reason: SDUPTHER

## 2017-11-09 RX ORDER — NICOTINE POLACRILEX 4 MG/1
20 GUM, CHEWING ORAL DAILY
Qty: 90 TABLET | Refills: 1 | Status: SHIPPED | OUTPATIENT
Start: 2017-11-09 | End: 2018-06-15 | Stop reason: SDUPTHER

## 2017-11-09 RX ORDER — LOSARTAN POTASSIUM 100 MG/1
100 TABLET ORAL DAILY
Qty: 90 TABLET | Refills: 1 | Status: SHIPPED | OUTPATIENT
Start: 2017-11-09 | End: 2018-06-15 | Stop reason: SDUPTHER

## 2018-01-25 RX ORDER — HYDRALAZINE HYDROCHLORIDE 25 MG/1
TABLET, FILM COATED ORAL
Qty: 90 TABLET | Refills: 0 | Status: SHIPPED | OUTPATIENT
Start: 2018-01-25 | End: 2018-06-15 | Stop reason: SDUPTHER

## 2018-06-15 ENCOUNTER — OFFICE VISIT (OUTPATIENT)
Dept: FAMILY MEDICINE CLINIC | Age: 83
End: 2018-06-15
Payer: MEDICARE

## 2018-06-15 VITALS
SYSTOLIC BLOOD PRESSURE: 110 MMHG | DIASTOLIC BLOOD PRESSURE: 74 MMHG | BODY MASS INDEX: 27.19 KG/M2 | HEART RATE: 72 BPM | HEIGHT: 61 IN | WEIGHT: 144 LBS | TEMPERATURE: 97.7 F

## 2018-06-15 DIAGNOSIS — I10 ESSENTIAL HYPERTENSION: Primary | ICD-10-CM

## 2018-06-15 DIAGNOSIS — M1A.0720 IDIOPATHIC CHRONIC GOUT OF LEFT FOOT WITHOUT TOPHUS: ICD-10-CM

## 2018-06-15 DIAGNOSIS — E78.5 HYPERLIPIDEMIA, UNSPECIFIED HYPERLIPIDEMIA TYPE: ICD-10-CM

## 2018-06-15 DIAGNOSIS — Z91.81 AT HIGH RISK FOR FALLS: ICD-10-CM

## 2018-06-15 PROCEDURE — G8399 PT W/DXA RESULTS DOCUMENT: HCPCS | Performed by: FAMILY MEDICINE

## 2018-06-15 PROCEDURE — G8417 CALC BMI ABV UP PARAM F/U: HCPCS | Performed by: FAMILY MEDICINE

## 2018-06-15 PROCEDURE — 1036F TOBACCO NON-USER: CPT | Performed by: FAMILY MEDICINE

## 2018-06-15 PROCEDURE — G8427 DOCREV CUR MEDS BY ELIG CLIN: HCPCS | Performed by: FAMILY MEDICINE

## 2018-06-15 PROCEDURE — 4040F PNEUMOC VAC/ADMIN/RCVD: CPT | Performed by: FAMILY MEDICINE

## 2018-06-15 PROCEDURE — 1090F PRES/ABSN URINE INCON ASSESS: CPT | Performed by: FAMILY MEDICINE

## 2018-06-15 PROCEDURE — 99213 OFFICE O/P EST LOW 20 MIN: CPT | Performed by: FAMILY MEDICINE

## 2018-06-15 PROCEDURE — 1123F ACP DISCUSS/DSCN MKR DOCD: CPT | Performed by: FAMILY MEDICINE

## 2018-06-15 RX ORDER — CLONIDINE HYDROCHLORIDE 0.2 MG/1
0.2 TABLET ORAL 2 TIMES DAILY
Qty: 180 TABLET | Refills: 0 | Status: SHIPPED | OUTPATIENT
Start: 2018-06-15 | End: 2018-10-29 | Stop reason: SDUPTHER

## 2018-06-15 RX ORDER — ATORVASTATIN CALCIUM 20 MG/1
20 TABLET, FILM COATED ORAL DAILY
Qty: 90 TABLET | Refills: 0 | Status: SHIPPED | OUTPATIENT
Start: 2018-06-15 | End: 2018-10-29 | Stop reason: SDUPTHER

## 2018-06-15 RX ORDER — LOSARTAN POTASSIUM 100 MG/1
100 TABLET ORAL DAILY
Qty: 90 TABLET | Refills: 0 | Status: SHIPPED | OUTPATIENT
Start: 2018-06-15 | End: 2018-10-29 | Stop reason: SDUPTHER

## 2018-06-15 RX ORDER — NICOTINE POLACRILEX 4 MG/1
20 GUM, CHEWING ORAL DAILY
Qty: 90 TABLET | Refills: 0 | Status: SHIPPED | OUTPATIENT
Start: 2018-06-15 | End: 2018-10-29 | Stop reason: SDUPTHER

## 2018-06-15 RX ORDER — ALLOPURINOL 100 MG/1
TABLET ORAL
Qty: 90 TABLET | Refills: 0 | Status: SHIPPED | OUTPATIENT
Start: 2018-06-15 | End: 2018-06-22 | Stop reason: DRUGHIGH

## 2018-06-15 RX ORDER — HYDRALAZINE HYDROCHLORIDE 25 MG/1
TABLET, FILM COATED ORAL
Qty: 270 TABLET | Refills: 0 | Status: SHIPPED | OUTPATIENT
Start: 2018-06-15 | End: 2018-10-29 | Stop reason: SDUPTHER

## 2018-06-15 RX ORDER — FUROSEMIDE 20 MG/1
20 TABLET ORAL 2 TIMES DAILY
Qty: 180 TABLET | Refills: 0 | Status: SHIPPED | OUTPATIENT
Start: 2018-06-15 | End: 2018-06-22 | Stop reason: DRUGHIGH

## 2018-06-15 ASSESSMENT — ENCOUNTER SYMPTOMS
CONSTIPATION: 0
SHORTNESS OF BREATH: 0
ABDOMINAL PAIN: 0
NAUSEA: 0
COUGH: 0
SORE THROAT: 0

## 2018-06-18 ENCOUNTER — TELEPHONE (OUTPATIENT)
Dept: FAMILY MEDICINE CLINIC | Age: 83
End: 2018-06-18

## 2018-06-18 ENCOUNTER — HOSPITAL ENCOUNTER (OUTPATIENT)
Age: 83
Discharge: HOME | End: 2018-06-18
Payer: MEDICARE

## 2018-06-18 DIAGNOSIS — M1A.0720 IDIOPATHIC CHRONIC GOUT OF LEFT FOOT WITHOUT TOPHUS: ICD-10-CM

## 2018-06-18 DIAGNOSIS — E78.5 HYPERLIPIDEMIA, UNSPECIFIED HYPERLIPIDEMIA TYPE: ICD-10-CM

## 2018-06-18 DIAGNOSIS — I10 ESSENTIAL HYPERTENSION: ICD-10-CM

## 2018-06-18 DIAGNOSIS — I10 ESSENTIAL HYPERTENSION: Primary | ICD-10-CM

## 2018-06-18 LAB
ALBUMIN SERPL-MCNC: 3.4 G/DL (ref 3.5–5.2)
ALBUMIN/GLOBULIN RATIO: ABNORMAL (ref 1–2.5)
ALP BLD-CCNC: 119 U/L (ref 35–104)
ALT SERPL-CCNC: 9 U/L (ref 5–33)
ANION GAP SERPL CALCULATED.3IONS-SCNC: 14 MMOL/L (ref 9–17)
AST SERPL-CCNC: 24 U/L
BILIRUB SERPL-MCNC: 0.51 MG/DL (ref 0.3–1.2)
BUN BLDV-MCNC: 46 MG/DL (ref 8–23)
BUN/CREAT BLD: ABNORMAL (ref 9–20)
CALCIUM SERPL-MCNC: 8 MG/DL (ref 8.6–10.4)
CHLORIDE BLD-SCNC: 87 MMOL/L (ref 98–107)
CHOLESTEROL/HDL RATIO: 2.2
CHOLESTEROL: 138 MG/DL
CO2: 23 MMOL/L (ref 20–31)
CREAT SERPL-MCNC: 1.89 MG/DL (ref 0.5–0.9)
GFR AFRICAN AMERICAN: 31 ML/MIN
GFR NON-AFRICAN AMERICAN: 26 ML/MIN
GFR SERPL CREATININE-BSD FRML MDRD: ABNORMAL ML/MIN/{1.73_M2}
GFR SERPL CREATININE-BSD FRML MDRD: ABNORMAL ML/MIN/{1.73_M2}
GLUCOSE BLD-MCNC: 89 MG/DL (ref 70–99)
HCT VFR BLD CALC: 32.8 % (ref 36–46)
HDLC SERPL-MCNC: 64 MG/DL
HEMOGLOBIN: 11.3 G/DL (ref 12–16)
LDL CHOLESTEROL: 61 MG/DL (ref 0–130)
MCH RBC QN AUTO: 34.4 PG (ref 26–34)
MCHC RBC AUTO-ENTMCNC: 34.4 G/DL (ref 31–37)
MCV RBC AUTO: 100.2 FL (ref 80–100)
NRBC AUTOMATED: ABNORMAL PER 100 WBC
PDW BLD-RTO: 16.8 % (ref 11.5–14.9)
PLATELET # BLD: 311 K/UL (ref 150–450)
PMV BLD AUTO: 10.2 FL (ref 6–12)
POTASSIUM SERPL-SCNC: 3.6 MMOL/L (ref 3.7–5.3)
RBC # BLD: 3.27 M/UL (ref 4–5.2)
SODIUM BLD-SCNC: 124 MMOL/L (ref 135–144)
TOTAL PROTEIN: 6.1 G/DL (ref 6.4–8.3)
TRIGL SERPL-MCNC: 65 MG/DL
URIC ACID: 4.9 MG/DL (ref 2.4–5.7)
VLDLC SERPL CALC-MCNC: NORMAL MG/DL (ref 1–30)
WBC # BLD: 6 K/UL (ref 3.5–11)

## 2018-06-18 PROCEDURE — 85027 COMPLETE CBC AUTOMATED: CPT

## 2018-06-18 PROCEDURE — 80061 LIPID PANEL: CPT

## 2018-06-18 PROCEDURE — 80053 COMPREHEN METABOLIC PANEL: CPT

## 2018-06-18 PROCEDURE — 84550 ASSAY OF BLOOD/URIC ACID: CPT

## 2018-06-18 PROCEDURE — 36415 COLL VENOUS BLD VENIPUNCTURE: CPT

## 2018-06-18 RX ORDER — SPIRONOLACTONE 25 MG/1
TABLET ORAL
Qty: 90 TABLET | Refills: 1 | OUTPATIENT
Start: 2018-06-18

## 2018-06-21 ENCOUNTER — HOSPITAL ENCOUNTER (OUTPATIENT)
Age: 83
Discharge: HOME | End: 2018-06-21
Payer: MEDICARE

## 2018-06-21 DIAGNOSIS — I10 ESSENTIAL HYPERTENSION: ICD-10-CM

## 2018-06-21 LAB
ANION GAP SERPL CALCULATED.3IONS-SCNC: 13 MMOL/L (ref 9–17)
BUN BLDV-MCNC: 41 MG/DL (ref 8–23)
BUN/CREAT BLD: ABNORMAL (ref 9–20)
CALCIUM SERPL-MCNC: 9.8 MG/DL (ref 8.6–10.4)
CHLORIDE BLD-SCNC: 100 MMOL/L (ref 98–107)
CO2: 24 MMOL/L (ref 20–31)
CREAT SERPL-MCNC: 1.94 MG/DL (ref 0.5–0.9)
GFR AFRICAN AMERICAN: 30 ML/MIN
GFR NON-AFRICAN AMERICAN: 25 ML/MIN
GFR SERPL CREATININE-BSD FRML MDRD: ABNORMAL ML/MIN/{1.73_M2}
GFR SERPL CREATININE-BSD FRML MDRD: ABNORMAL ML/MIN/{1.73_M2}
GLUCOSE BLD-MCNC: 87 MG/DL (ref 70–99)
POTASSIUM SERPL-SCNC: 5.8 MMOL/L (ref 3.7–5.3)
SODIUM BLD-SCNC: 137 MMOL/L (ref 135–144)

## 2018-06-21 PROCEDURE — 36415 COLL VENOUS BLD VENIPUNCTURE: CPT

## 2018-06-21 PROCEDURE — 80048 BASIC METABOLIC PNL TOTAL CA: CPT

## 2018-06-22 ENCOUNTER — OFFICE VISIT (OUTPATIENT)
Dept: FAMILY MEDICINE CLINIC | Age: 83
End: 2018-06-22
Payer: MEDICARE

## 2018-06-22 VITALS
SYSTOLIC BLOOD PRESSURE: 130 MMHG | BODY MASS INDEX: 27.34 KG/M2 | WEIGHT: 144.8 LBS | OXYGEN SATURATION: 99 % | HEIGHT: 61 IN | TEMPERATURE: 98.6 F | DIASTOLIC BLOOD PRESSURE: 78 MMHG | HEART RATE: 87 BPM

## 2018-06-22 DIAGNOSIS — N18.30 STAGE 3 CHRONIC KIDNEY DISEASE (HCC): ICD-10-CM

## 2018-06-22 DIAGNOSIS — R06.02 SOB (SHORTNESS OF BREATH): ICD-10-CM

## 2018-06-22 DIAGNOSIS — I10 ESSENTIAL HYPERTENSION: Primary | ICD-10-CM

## 2018-06-22 PROCEDURE — 1036F TOBACCO NON-USER: CPT | Performed by: FAMILY MEDICINE

## 2018-06-22 PROCEDURE — G8427 DOCREV CUR MEDS BY ELIG CLIN: HCPCS | Performed by: FAMILY MEDICINE

## 2018-06-22 PROCEDURE — 1090F PRES/ABSN URINE INCON ASSESS: CPT | Performed by: FAMILY MEDICINE

## 2018-06-22 PROCEDURE — G8399 PT W/DXA RESULTS DOCUMENT: HCPCS | Performed by: FAMILY MEDICINE

## 2018-06-22 PROCEDURE — 1123F ACP DISCUSS/DSCN MKR DOCD: CPT | Performed by: FAMILY MEDICINE

## 2018-06-22 PROCEDURE — 4040F PNEUMOC VAC/ADMIN/RCVD: CPT | Performed by: FAMILY MEDICINE

## 2018-06-22 PROCEDURE — 99213 OFFICE O/P EST LOW 20 MIN: CPT | Performed by: FAMILY MEDICINE

## 2018-06-22 PROCEDURE — G8417 CALC BMI ABV UP PARAM F/U: HCPCS | Performed by: FAMILY MEDICINE

## 2018-06-22 RX ORDER — POTASSIUM CHLORIDE 20 MEQ/1
40 TABLET, EXTENDED RELEASE ORAL DAILY
COMMUNITY
End: 2018-06-22 | Stop reason: ALTCHOICE

## 2018-06-22 RX ORDER — FUROSEMIDE 20 MG/1
20 TABLET ORAL DAILY
COMMUNITY
End: 2018-10-29 | Stop reason: SDUPTHER

## 2018-06-22 RX ORDER — ALLOPURINOL 100 MG/1
100 TABLET ORAL DAILY
COMMUNITY
End: 2018-10-29 | Stop reason: SDUPTHER

## 2018-06-22 ASSESSMENT — ENCOUNTER SYMPTOMS
ABDOMINAL PAIN: 0
NAUSEA: 0
SHORTNESS OF BREATH: 0
SORE THROAT: 0
CONSTIPATION: 0

## 2018-10-29 ENCOUNTER — OFFICE VISIT (OUTPATIENT)
Dept: FAMILY MEDICINE CLINIC | Age: 83
End: 2018-10-29
Payer: MEDICARE

## 2018-10-29 VITALS
HEIGHT: 60 IN | DIASTOLIC BLOOD PRESSURE: 70 MMHG | SYSTOLIC BLOOD PRESSURE: 90 MMHG | HEART RATE: 98 BPM | BODY MASS INDEX: 28.35 KG/M2 | WEIGHT: 144.4 LBS | RESPIRATION RATE: 18 BRPM | OXYGEN SATURATION: 99 % | TEMPERATURE: 97.5 F

## 2018-10-29 DIAGNOSIS — I10 ESSENTIAL HYPERTENSION: Primary | ICD-10-CM

## 2018-10-29 DIAGNOSIS — M1A.0720 IDIOPATHIC CHRONIC GOUT OF LEFT FOOT WITHOUT TOPHUS: ICD-10-CM

## 2018-10-29 DIAGNOSIS — Z23 NEED FOR PROPHYLACTIC VACCINATION AND INOCULATION AGAINST INFLUENZA: ICD-10-CM

## 2018-10-29 DIAGNOSIS — N18.30 CKD (CHRONIC KIDNEY DISEASE) STAGE 3, GFR 30-59 ML/MIN (HCC): ICD-10-CM

## 2018-10-29 DIAGNOSIS — E78.5 HYPERLIPIDEMIA, UNSPECIFIED HYPERLIPIDEMIA TYPE: ICD-10-CM

## 2018-10-29 PROCEDURE — G8427 DOCREV CUR MEDS BY ELIG CLIN: HCPCS | Performed by: FAMILY MEDICINE

## 2018-10-29 PROCEDURE — 4040F PNEUMOC VAC/ADMIN/RCVD: CPT | Performed by: FAMILY MEDICINE

## 2018-10-29 PROCEDURE — 1101F PT FALLS ASSESS-DOCD LE1/YR: CPT | Performed by: FAMILY MEDICINE

## 2018-10-29 PROCEDURE — G8482 FLU IMMUNIZE ORDER/ADMIN: HCPCS | Performed by: FAMILY MEDICINE

## 2018-10-29 PROCEDURE — 90662 IIV NO PRSV INCREASED AG IM: CPT | Performed by: FAMILY MEDICINE

## 2018-10-29 PROCEDURE — G8417 CALC BMI ABV UP PARAM F/U: HCPCS | Performed by: FAMILY MEDICINE

## 2018-10-29 PROCEDURE — 1090F PRES/ABSN URINE INCON ASSESS: CPT | Performed by: FAMILY MEDICINE

## 2018-10-29 PROCEDURE — 1123F ACP DISCUSS/DSCN MKR DOCD: CPT | Performed by: FAMILY MEDICINE

## 2018-10-29 PROCEDURE — G8399 PT W/DXA RESULTS DOCUMENT: HCPCS | Performed by: FAMILY MEDICINE

## 2018-10-29 PROCEDURE — G8510 SCR DEP NEG, NO PLAN REQD: HCPCS | Performed by: FAMILY MEDICINE

## 2018-10-29 PROCEDURE — 99214 OFFICE O/P EST MOD 30 MIN: CPT | Performed by: FAMILY MEDICINE

## 2018-10-29 PROCEDURE — G0008 ADMIN INFLUENZA VIRUS VAC: HCPCS | Performed by: FAMILY MEDICINE

## 2018-10-29 PROCEDURE — 1036F TOBACCO NON-USER: CPT | Performed by: FAMILY MEDICINE

## 2018-10-29 RX ORDER — ALLOPURINOL 100 MG/1
100 TABLET ORAL DAILY
Qty: 90 TABLET | Refills: 1 | Status: SHIPPED | OUTPATIENT
Start: 2018-10-29 | End: 2019-10-10 | Stop reason: SDUPTHER

## 2018-10-29 RX ORDER — NICOTINE POLACRILEX 4 MG/1
20 GUM, CHEWING ORAL DAILY
Qty: 90 TABLET | Refills: 1 | Status: SHIPPED | OUTPATIENT
Start: 2018-10-29 | End: 2019-10-10 | Stop reason: SDUPTHER

## 2018-10-29 RX ORDER — LOSARTAN POTASSIUM 100 MG/1
100 TABLET ORAL DAILY
Qty: 90 TABLET | Refills: 1 | Status: SHIPPED | OUTPATIENT
Start: 2018-10-29 | End: 2019-10-10 | Stop reason: SDUPTHER

## 2018-10-29 RX ORDER — FUROSEMIDE 20 MG/1
20 TABLET ORAL DAILY
Qty: 180 TABLET | Refills: 1 | Status: SHIPPED | OUTPATIENT
Start: 2018-10-29 | End: 2019-10-10 | Stop reason: SDUPTHER

## 2018-10-29 RX ORDER — CLONIDINE HYDROCHLORIDE 0.2 MG/1
0.2 TABLET ORAL DAILY
Qty: 180 TABLET | Refills: 1 | Status: SHIPPED | OUTPATIENT
Start: 2018-10-29 | End: 2019-10-10 | Stop reason: SDUPTHER

## 2018-10-29 RX ORDER — ATORVASTATIN CALCIUM 20 MG/1
20 TABLET, FILM COATED ORAL DAILY
Qty: 90 TABLET | Refills: 1 | Status: SHIPPED | OUTPATIENT
Start: 2018-10-29 | End: 2019-10-10 | Stop reason: SDUPTHER

## 2018-10-29 RX ORDER — HYDRALAZINE HYDROCHLORIDE 25 MG/1
TABLET, FILM COATED ORAL
Qty: 270 TABLET | Refills: 1 | Status: SHIPPED | OUTPATIENT
Start: 2018-10-29 | End: 2019-10-10 | Stop reason: SDUPTHER

## 2018-10-29 ASSESSMENT — PATIENT HEALTH QUESTIONNAIRE - PHQ9
SUM OF ALL RESPONSES TO PHQ9 QUESTIONS 1 & 2: 0
2. FEELING DOWN, DEPRESSED OR HOPELESS: 0
SUM OF ALL RESPONSES TO PHQ QUESTIONS 1-9: 0
SUM OF ALL RESPONSES TO PHQ QUESTIONS 1-9: 0
1. LITTLE INTEREST OR PLEASURE IN DOING THINGS: 0

## 2018-10-29 ASSESSMENT — ENCOUNTER SYMPTOMS
SORE THROAT: 0
NAUSEA: 0
ABDOMINAL PAIN: 0
SHORTNESS OF BREATH: 0
CONSTIPATION: 0

## 2018-10-29 NOTE — PROGRESS NOTES
is supposed to wear the stockings but she is having hard time putting them on and she has been taking her clonidine sometimes once a day so I told her we will cut it to once a day no chest pain or shortness of breath. Has got chronic kidney disease and has not seen a nephrologist and also has not seen a cardiologist for some time so I told her she needs to make an appointment with them  Review of Systems   Constitutional: Negative for appetite change and fatigue. HENT: Negative for ear pain and sore throat. Eyes: Positive for visual disturbance. Wears glasses   Respiratory: Negative for shortness of breath. Cardiovascular: Negative for chest pain and leg swelling. Gastrointestinal: Negative for abdominal pain, constipation and nausea. Genitourinary: Negative for frequency and pelvic pain. Musculoskeletal: Positive for arthralgias. Neurological: Negative for dizziness, syncope and headaches. Objective:   Physical Exam   Constitutional: She is oriented to person, place, and time. She appears well-developed and well-nourished. BP 90/70 (Site: Left Upper Arm, Position: Standing, Cuff Size: Large Adult)   Pulse 98   Temp 97.5 °F (36.4 °C) (Oral)   Resp 18   Ht 5' (1.524 m)   Wt 144 lb 6.4 oz (65.5 kg)   SpO2 99%   BMI 28.20 kg/m²    HENT:   Head: Normocephalic. Mouth/Throat: Oropharynx is clear and moist.        Cardiovascular: Normal rate and regular rhythm. Pulmonary/Chest: Breath sounds normal. She has no rales. Abdominal: Soft. Bowel sounds are normal. There is no tenderness. Musculoskeletal: She exhibits edema. Minimal pedal edema present of both legs   Lymphadenopathy:     She has no cervical adenopathy. Neurological: She is alert and oriented to person, place, and time. Skin: No rash noted. Nursing note and vitals reviewed. Assessment:       Diagnosis Orders   1. Essential hypertension  Comprehensive Metabolic Panel   2.  Need for prophylactic vaccination

## 2018-11-01 ENCOUNTER — TELEPHONE (OUTPATIENT)
Dept: FAMILY MEDICINE CLINIC | Age: 83
End: 2018-11-01

## 2018-11-01 NOTE — TELEPHONE ENCOUNTER
Patient stated she saw Dr Veronica santana on her phone but a message was not left, I am not seeing a phone encounter from yesterday.  I will speak with Chelita to see if she called/mayur,

## 2018-12-04 ENCOUNTER — HOSPITAL ENCOUNTER (EMERGENCY)
Age: 83
Discharge: HOME | End: 2018-12-04
Attending: EMERGENCY MEDICINE
Payer: MEDICARE

## 2018-12-04 VITALS
SYSTOLIC BLOOD PRESSURE: 124 MMHG | HEART RATE: 89 BPM | RESPIRATION RATE: 17 BRPM | OXYGEN SATURATION: 100 % | DIASTOLIC BLOOD PRESSURE: 94 MMHG | TEMPERATURE: 98.4 F

## 2018-12-04 DIAGNOSIS — R42 DIZZINESS: Primary | ICD-10-CM

## 2018-12-04 LAB
ABSOLUTE EOS #: 0.1 K/UL (ref 0–0.4)
ABSOLUTE IMMATURE GRANULOCYTE: ABNORMAL K/UL (ref 0–0.3)
ABSOLUTE LYMPH #: 1.5 K/UL (ref 1–4.8)
ABSOLUTE MONO #: 0.4 K/UL (ref 0.1–1.3)
ANION GAP SERPL CALCULATED.3IONS-SCNC: 17 MMOL/L (ref 9–17)
BASOPHILS # BLD: 1 % (ref 0–2)
BASOPHILS ABSOLUTE: 0 K/UL (ref 0–0.2)
BUN BLDV-MCNC: 27 MG/DL (ref 8–23)
BUN/CREAT BLD: ABNORMAL (ref 9–20)
CALCIUM SERPL-MCNC: 8.8 MG/DL (ref 8.6–10.4)
CHLORIDE BLD-SCNC: 102 MMOL/L (ref 98–107)
CO2: 25 MMOL/L (ref 20–31)
CREAT SERPL-MCNC: 1.74 MG/DL (ref 0.5–0.9)
DIFFERENTIAL TYPE: ABNORMAL
EOSINOPHILS RELATIVE PERCENT: 1 % (ref 0–4)
GFR AFRICAN AMERICAN: 34 ML/MIN
GFR NON-AFRICAN AMERICAN: 28 ML/MIN
GFR SERPL CREATININE-BSD FRML MDRD: ABNORMAL ML/MIN/{1.73_M2}
GFR SERPL CREATININE-BSD FRML MDRD: ABNORMAL ML/MIN/{1.73_M2}
GLUCOSE BLD-MCNC: 139 MG/DL (ref 70–99)
HCT VFR BLD CALC: 34.2 % (ref 36–46)
HEMOGLOBIN: 11.6 G/DL (ref 12–16)
IMMATURE GRANULOCYTES: ABNORMAL %
LYMPHOCYTES # BLD: 18 % (ref 24–44)
MCH RBC QN AUTO: 34.2 PG (ref 26–34)
MCHC RBC AUTO-ENTMCNC: 34 G/DL (ref 31–37)
MCV RBC AUTO: 100.5 FL (ref 80–100)
MONOCYTES # BLD: 5 % (ref 1–7)
MYOGLOBIN: 155 NG/ML (ref 25–58)
NRBC AUTOMATED: ABNORMAL PER 100 WBC
PDW BLD-RTO: 18 % (ref 11.5–14.9)
PLATELET # BLD: 310 K/UL (ref 150–450)
PLATELET ESTIMATE: ABNORMAL
PMV BLD AUTO: 9.2 FL (ref 6–12)
POTASSIUM SERPL-SCNC: 3.4 MMOL/L (ref 3.7–5.3)
RBC # BLD: 3.4 M/UL (ref 4–5.2)
RBC # BLD: ABNORMAL 10*6/UL
SEG NEUTROPHILS: 75 % (ref 36–66)
SEGMENTED NEUTROPHILS ABSOLUTE COUNT: 6.1 K/UL (ref 1.3–9.1)
SODIUM BLD-SCNC: 144 MMOL/L (ref 135–144)
TROPONIN INTERP: ABNORMAL
TROPONIN T: <0.03 NG/ML
WBC # BLD: 8.1 K/UL (ref 3.5–11)
WBC # BLD: ABNORMAL 10*3/UL

## 2018-12-04 PROCEDURE — 93005 ELECTROCARDIOGRAM TRACING: CPT

## 2018-12-04 PROCEDURE — 36415 COLL VENOUS BLD VENIPUNCTURE: CPT

## 2018-12-04 PROCEDURE — 85025 COMPLETE CBC W/AUTO DIFF WBC: CPT

## 2018-12-04 PROCEDURE — 99284 EMERGENCY DEPT VISIT MOD MDM: CPT

## 2018-12-04 PROCEDURE — 80048 BASIC METABOLIC PNL TOTAL CA: CPT

## 2018-12-04 PROCEDURE — 84484 ASSAY OF TROPONIN QUANT: CPT

## 2018-12-04 PROCEDURE — 83874 ASSAY OF MYOGLOBIN: CPT

## 2018-12-04 ASSESSMENT — ENCOUNTER SYMPTOMS
NAUSEA: 0
SORE THROAT: 0
EYE REDNESS: 0
SINUS PRESSURE: 0
CONSTIPATION: 0
WHEEZING: 0
CHEST TIGHTNESS: 0
RHINORRHEA: 0
SHORTNESS OF BREATH: 0
COUGH: 0
DIARRHEA: 0
EYE DISCHARGE: 0
VOMITING: 0
BLOOD IN STOOL: 0
BACK PAIN: 0
ABDOMINAL PAIN: 0
COLOR CHANGE: 0
FACIAL SWELLING: 0
EYE PAIN: 0
TROUBLE SWALLOWING: 0

## 2018-12-04 NOTE — ED PROVIDER NOTES
[address]  eMERGENCY dEPARTMENT eNCOUnter      Pt Name: Soila Rogers  MRN: [id number]  [place] 1935  Date of evaluation: 12/4/18      CHIEF COMPLAINT       Chief Complaint   Patient presents with    Dizziness         R Julia Martinez is a 81 y.o. female who presents complaining of Dizziness. Patient states that she normally just has a couple coffee for breakfast and lunch and then eats a normal dinner at night. Patient states today she did not stop at lunch time to get her couple [place] instead went to the store. While she was in the store she started getting dizzy and lightheaded. Patient sat down and states that she was feeling better. Patient states she had no headache numbness tingling weakness chest pain shortness of breath palpitations or abdominal pain. Patient states she feels absolutely fine now. REVIEW OF SYSTEMS       Review of Systems   Constitutional: Negative for activity change, appetite change, chills, diaphoresis and fever. HENT: Negative for congestion, ear pain, facial swelling, nosebleeds, rhinorrhea, sinus pressure, sore throat and trouble swallowing. Eyes: Negative for pain, discharge and redness. Respiratory: Negative for cough, chest tightness, shortness of breath and wheezing. Cardiovascular: Negative for chest pain, palpitations and leg swelling. Gastrointestinal: Negative for abdominal pain, blood in stool, constipation, diarrhea, nausea and vomiting. Genitourinary: Negative for difficulty urinating, dysuria, flank pain, frequency, genital sores and hematuria. Musculoskeletal: Negative for arthralgias, back pain, gait problem, joint swelling, myalgias and neck pain. Skin: Negative for color change, pallor, rash and wound. Neurological: Positive for dizziness and light-headedness. Negative for tremors, seizures, syncope, speech difficulty, weakness, numbness and headaches. Co-signs this chart in the absence of a cardiologist.      EKG Interpretation    Interpreted by emergency department physician    Rhythm: normal sinus   Rate: normal  Axis: left  Ectopy: none  Conduction: normal  ST Segments: nonspecific changes  T Waves: non specific changes  Q Waves: none    EKG  Impression: normal sinus rhythm, nonspecific ST and T waves changes, left axis deviation. RADIOLOGY:All plain film, CT, MRI, and formal ultrasound images (except ED bedside ultrasound)are read by the radiologist and interpretations are directly viewed by the emergency physician. LABS: All lab results were reviewed bycharlesself, and all abnormals are listed below. Labs Reviewed   CBC WITH AUTO DIFFERENTIAL - Abnormal; Notable for the following:        Result Value    RBC 3.40 (*)     Hemoglobin 11.6 (*)     Hematocrit 34.2 (*)     .5 (*)     MCH 34.2 (*)     RDW 18.0 (*)     Seg Neutrophils 75 (*)     Lymphocytes 18 (*)     All other components within normal limits   BASIC METABOLIC PANEL - Abnormal; Notable for the following:     Glucose 139 (*)     BUN 27 (*)     CREATININE 1.74 (*)     Potassium 3.4 (*)     GFR Non- 28 (*)     GFR  34 (*)     All other components within normal limits   TROP/MYOGLOBIN - Abnormal; Notable for the following:     Myoglobin 155 (*)     All other components within normal limits         EMERGENCY DEPARTMENT COURSE:   Vitals:    Vitals:    12/04/18 1358   BP: (!) 124/94   Pulse: 89   Resp: 17   SpO2: 100%       The patient was given the following medications while in the emergency department:  No orders of the defined types were placed in this encounter. -------------------------  3:17 PM  Patient has remained asymptomatic the entire time she's been here. Patient drank her coffee and feels well. Patient was able to get up and walk around without any difficulty. Patient is okay to be discharged home.     CRITICAL CARE:

## 2018-12-06 LAB
EKG ATRIAL RATE: 88 BPM
EKG P AXIS: 13 DEGREES
EKG P-R INTERVAL: 152 MS
EKG Q-T INTERVAL: 394 MS
EKG QRS DURATION: 84 MS
EKG QTC CALCULATION (BAZETT): 476 MS
EKG R AXIS: -42 DEGREES
EKG T AXIS: 16 DEGREES
EKG VENTRICULAR RATE: 88 BPM

## 2019-10-10 ENCOUNTER — OFFICE VISIT (OUTPATIENT)
Dept: FAMILY MEDICINE CLINIC | Age: 84
End: 2019-10-10
Payer: MEDICARE

## 2019-10-10 VITALS
SYSTOLIC BLOOD PRESSURE: 110 MMHG | OXYGEN SATURATION: 99 % | BODY MASS INDEX: 28.47 KG/M2 | DIASTOLIC BLOOD PRESSURE: 70 MMHG | HEIGHT: 60 IN | WEIGHT: 145 LBS | TEMPERATURE: 97.6 F | HEART RATE: 62 BPM

## 2019-10-10 DIAGNOSIS — Z23 NEED FOR PNEUMOCOCCAL VACCINATION: ICD-10-CM

## 2019-10-10 DIAGNOSIS — D64.9 ANEMIA, UNSPECIFIED TYPE: ICD-10-CM

## 2019-10-10 DIAGNOSIS — N18.30 CHRONIC KIDNEY DISEASE, STAGE 3 (MODERATE): ICD-10-CM

## 2019-10-10 DIAGNOSIS — I10 ESSENTIAL HYPERTENSION: Primary | ICD-10-CM

## 2019-10-10 DIAGNOSIS — E78.5 HYPERLIPIDEMIA, UNSPECIFIED HYPERLIPIDEMIA TYPE: ICD-10-CM

## 2019-10-10 DIAGNOSIS — Z23 NEED FOR INFLUENZA VACCINATION: ICD-10-CM

## 2019-10-10 PROCEDURE — 90670 PCV13 VACCINE IM: CPT | Performed by: FAMILY MEDICINE

## 2019-10-10 PROCEDURE — 1123F ACP DISCUSS/DSCN MKR DOCD: CPT | Performed by: FAMILY MEDICINE

## 2019-10-10 PROCEDURE — G0008 ADMIN INFLUENZA VIRUS VAC: HCPCS | Performed by: FAMILY MEDICINE

## 2019-10-10 PROCEDURE — G8427 DOCREV CUR MEDS BY ELIG CLIN: HCPCS | Performed by: FAMILY MEDICINE

## 2019-10-10 PROCEDURE — 1036F TOBACCO NON-USER: CPT | Performed by: FAMILY MEDICINE

## 2019-10-10 PROCEDURE — G8399 PT W/DXA RESULTS DOCUMENT: HCPCS | Performed by: FAMILY MEDICINE

## 2019-10-10 PROCEDURE — G0009 ADMIN PNEUMOCOCCAL VACCINE: HCPCS | Performed by: FAMILY MEDICINE

## 2019-10-10 PROCEDURE — 4040F PNEUMOC VAC/ADMIN/RCVD: CPT | Performed by: FAMILY MEDICINE

## 2019-10-10 PROCEDURE — G8482 FLU IMMUNIZE ORDER/ADMIN: HCPCS | Performed by: FAMILY MEDICINE

## 2019-10-10 PROCEDURE — 90653 IIV ADJUVANT VACCINE IM: CPT | Performed by: FAMILY MEDICINE

## 2019-10-10 PROCEDURE — G8417 CALC BMI ABV UP PARAM F/U: HCPCS | Performed by: FAMILY MEDICINE

## 2019-10-10 PROCEDURE — 1090F PRES/ABSN URINE INCON ASSESS: CPT | Performed by: FAMILY MEDICINE

## 2019-10-10 PROCEDURE — 99214 OFFICE O/P EST MOD 30 MIN: CPT | Performed by: FAMILY MEDICINE

## 2019-10-10 RX ORDER — CLONIDINE HYDROCHLORIDE 0.2 MG/1
0.2 TABLET ORAL DAILY
Qty: 180 TABLET | Refills: 1 | Status: SHIPPED | OUTPATIENT
Start: 2019-10-10 | End: 2020-08-21 | Stop reason: DRUGHIGH

## 2019-10-10 RX ORDER — HYDRALAZINE HYDROCHLORIDE 25 MG/1
TABLET, FILM COATED ORAL
Qty: 270 TABLET | Refills: 1 | Status: ON HOLD | OUTPATIENT
Start: 2019-10-10 | End: 2020-08-17 | Stop reason: HOSPADM

## 2019-10-10 RX ORDER — LOSARTAN POTASSIUM 100 MG/1
100 TABLET ORAL DAILY
Qty: 90 TABLET | Refills: 1 | Status: ON HOLD | OUTPATIENT
Start: 2019-10-10 | End: 2020-08-17 | Stop reason: HOSPADM

## 2019-10-10 RX ORDER — NICOTINE POLACRILEX 4 MG/1
20 GUM, CHEWING ORAL DAILY
Qty: 90 TABLET | Refills: 1 | Status: SHIPPED | OUTPATIENT
Start: 2019-10-10 | End: 2020-08-14 | Stop reason: DRUGHIGH

## 2019-10-10 RX ORDER — ATORVASTATIN CALCIUM 20 MG/1
20 TABLET, FILM COATED ORAL DAILY
Qty: 90 TABLET | Refills: 1 | Status: SHIPPED | OUTPATIENT
Start: 2019-10-10 | End: 2021-03-22 | Stop reason: SDUPTHER

## 2019-10-10 RX ORDER — FUROSEMIDE 20 MG/1
20 TABLET ORAL DAILY
Qty: 180 TABLET | Refills: 1 | Status: ON HOLD | OUTPATIENT
Start: 2019-10-10 | End: 2020-08-24 | Stop reason: HOSPADM

## 2019-10-10 RX ORDER — ALLOPURINOL 100 MG/1
100 TABLET ORAL DAILY
Qty: 90 TABLET | Refills: 1 | Status: SHIPPED | OUTPATIENT
Start: 2019-10-10 | End: 2019-10-24 | Stop reason: DRUGHIGH

## 2019-10-10 ASSESSMENT — PATIENT HEALTH QUESTIONNAIRE - PHQ9
2. FEELING DOWN, DEPRESSED OR HOPELESS: 0
SUM OF ALL RESPONSES TO PHQ QUESTIONS 1-9: 0
1. LITTLE INTEREST OR PLEASURE IN DOING THINGS: 0
SUM OF ALL RESPONSES TO PHQ QUESTIONS 1-9: 0
SUM OF ALL RESPONSES TO PHQ9 QUESTIONS 1 & 2: 0

## 2019-10-10 ASSESSMENT — ENCOUNTER SYMPTOMS
SORE THROAT: 0
NAUSEA: 0
ABDOMINAL PAIN: 0
SHORTNESS OF BREATH: 0

## 2019-10-14 ENCOUNTER — HOSPITAL ENCOUNTER (OUTPATIENT)
Age: 84
Discharge: HOME | End: 2019-10-14
Payer: MEDICARE

## 2019-10-14 ENCOUNTER — TELEPHONE (OUTPATIENT)
Dept: FAMILY MEDICINE CLINIC | Age: 84
End: 2019-10-14

## 2019-10-14 DIAGNOSIS — I10 ESSENTIAL HYPERTENSION: ICD-10-CM

## 2019-10-14 DIAGNOSIS — D64.9 ANEMIA, UNSPECIFIED TYPE: ICD-10-CM

## 2019-10-14 DIAGNOSIS — E87.1 HYPONATREMIA: Primary | ICD-10-CM

## 2019-10-14 DIAGNOSIS — E87.6 HYPOKALEMIA: ICD-10-CM

## 2019-10-14 DIAGNOSIS — M1A.0720 IDIOPATHIC CHRONIC GOUT OF LEFT FOOT WITHOUT TOPHUS: ICD-10-CM

## 2019-10-14 DIAGNOSIS — E78.5 HYPERLIPIDEMIA, UNSPECIFIED HYPERLIPIDEMIA TYPE: ICD-10-CM

## 2019-10-14 LAB
ABSOLUTE EOS #: 0.23 K/UL (ref 0–0.4)
ABSOLUTE IMMATURE GRANULOCYTE: ABNORMAL K/UL (ref 0–0.3)
ABSOLUTE LYMPH #: 2.05 K/UL (ref 1–4.8)
ABSOLUTE MONO #: 0.29 K/UL (ref 0.1–1.3)
ALBUMIN SERPL-MCNC: 4.3 G/DL (ref 3.5–5.2)
ALBUMIN/GLOBULIN RATIO: ABNORMAL (ref 1–2.5)
ALP BLD-CCNC: 88 U/L (ref 35–104)
ALT SERPL-CCNC: 12 U/L (ref 5–33)
ANION GAP SERPL CALCULATED.3IONS-SCNC: 19 MMOL/L (ref 9–17)
AST SERPL-CCNC: 32 U/L
ATYPICAL LYMPHOCYTE ABSOLUTE COUNT: 0.11 K/UL
ATYPICAL LYMPHOCYTES: 2 %
BASOPHILS # BLD: 0 % (ref 0–2)
BASOPHILS ABSOLUTE: 0 K/UL (ref 0–0.2)
BILIRUB SERPL-MCNC: 1.48 MG/DL (ref 0.3–1.2)
BUN BLDV-MCNC: 38 MG/DL (ref 8–23)
BUN/CREAT BLD: ABNORMAL (ref 9–20)
CALCIUM SERPL-MCNC: 9 MG/DL (ref 8.6–10.4)
CHLORIDE BLD-SCNC: 75 MMOL/L (ref 98–107)
CHOLESTEROL/HDL RATIO: 1.2
CHOLESTEROL: 179 MG/DL
CO2: 20 MMOL/L (ref 20–31)
CREAT SERPL-MCNC: 1.75 MG/DL (ref 0.5–0.9)
DIFFERENTIAL TYPE: ABNORMAL
EOSINOPHILS RELATIVE PERCENT: 4 % (ref 0–4)
GFR AFRICAN AMERICAN: 34 ML/MIN
GFR NON-AFRICAN AMERICAN: 28 ML/MIN
GFR SERPL CREATININE-BSD FRML MDRD: ABNORMAL ML/MIN/{1.73_M2}
GFR SERPL CREATININE-BSD FRML MDRD: ABNORMAL ML/MIN/{1.73_M2}
GLUCOSE BLD-MCNC: 94 MG/DL (ref 70–99)
HCT VFR BLD CALC: 33.7 % (ref 36–46)
HDLC SERPL-MCNC: 154 MG/DL
HEMOGLOBIN: 11.3 G/DL (ref 12–16)
IMMATURE GRANULOCYTES: ABNORMAL %
LDL CHOLESTEROL: 10 MG/DL (ref 0–130)
LYMPHOCYTES # BLD: 36 % (ref 24–44)
MCH RBC QN AUTO: 32.6 PG (ref 26–34)
MCHC RBC AUTO-ENTMCNC: 33.5 G/DL (ref 31–37)
MCV RBC AUTO: 97.5 FL (ref 80–100)
MONOCYTES # BLD: 5 % (ref 1–7)
MORPHOLOGY: ABNORMAL
NRBC AUTOMATED: ABNORMAL PER 100 WBC
PDW BLD-RTO: 16.2 % (ref 11.5–14.9)
PLATELET # BLD: 267 K/UL (ref 150–450)
PLATELET ESTIMATE: ABNORMAL
PMV BLD AUTO: 9.7 FL (ref 6–12)
POTASSIUM SERPL-SCNC: 3.5 MMOL/L (ref 3.7–5.3)
RBC # BLD: 3.46 M/UL (ref 4–5.2)
RBC # BLD: ABNORMAL 10*6/UL
SEG NEUTROPHILS: 53 % (ref 36–66)
SEGMENTED NEUTROPHILS ABSOLUTE COUNT: 3.02 K/UL (ref 1.3–9.1)
SODIUM BLD-SCNC: 114 MMOL/L (ref 135–144)
TOTAL PROTEIN: 7 G/DL (ref 6.4–8.3)
TRIGL SERPL-MCNC: 73 MG/DL
URIC ACID: 7.5 MG/DL (ref 2.4–5.7)
VLDLC SERPL CALC-MCNC: NORMAL MG/DL (ref 1–30)
WBC # BLD: 5.7 K/UL (ref 3.5–11)
WBC # BLD: ABNORMAL 10*3/UL

## 2019-10-14 PROCEDURE — 85025 COMPLETE CBC W/AUTO DIFF WBC: CPT

## 2019-10-14 PROCEDURE — 80053 COMPREHEN METABOLIC PANEL: CPT

## 2019-10-14 PROCEDURE — 80061 LIPID PANEL: CPT

## 2019-10-14 PROCEDURE — 84550 ASSAY OF BLOOD/URIC ACID: CPT

## 2019-10-14 PROCEDURE — 36415 COLL VENOUS BLD VENIPUNCTURE: CPT

## 2019-10-14 RX ORDER — POTASSIUM CHLORIDE 20 MEQ/1
20 TABLET, EXTENDED RELEASE ORAL DAILY
Qty: 4 TABLET | Refills: 0 | Status: SHIPPED | OUTPATIENT
Start: 2019-10-14 | End: 2019-10-24 | Stop reason: ALTCHOICE

## 2019-10-17 ENCOUNTER — TELEPHONE (OUTPATIENT)
Dept: FAMILY MEDICINE CLINIC | Age: 84
End: 2019-10-17

## 2019-10-17 ENCOUNTER — HOSPITAL ENCOUNTER (OUTPATIENT)
Age: 84
Discharge: HOME | End: 2019-10-17
Payer: MEDICARE

## 2019-10-17 DIAGNOSIS — E87.1 HYPONATREMIA: ICD-10-CM

## 2019-10-17 DIAGNOSIS — E87.6 HYPOKALEMIA: ICD-10-CM

## 2019-10-17 LAB
ANION GAP SERPL CALCULATED.3IONS-SCNC: 14 MMOL/L (ref 9–17)
BUN BLDV-MCNC: 27 MG/DL (ref 8–23)
BUN/CREAT BLD: ABNORMAL (ref 9–20)
CALCIUM SERPL-MCNC: 8.9 MG/DL (ref 8.6–10.4)
CHLORIDE BLD-SCNC: 87 MMOL/L (ref 98–107)
CO2: 18 MMOL/L (ref 20–31)
CREAT SERPL-MCNC: 1.34 MG/DL (ref 0.5–0.9)
GFR AFRICAN AMERICAN: 46 ML/MIN
GFR NON-AFRICAN AMERICAN: 38 ML/MIN
GFR SERPL CREATININE-BSD FRML MDRD: ABNORMAL ML/MIN/{1.73_M2}
GFR SERPL CREATININE-BSD FRML MDRD: ABNORMAL ML/MIN/{1.73_M2}
GLUCOSE BLD-MCNC: 83 MG/DL (ref 70–99)
POTASSIUM SERPL-SCNC: 4.7 MMOL/L (ref 3.7–5.3)
SODIUM BLD-SCNC: 119 MMOL/L (ref 135–144)

## 2019-10-17 PROCEDURE — 80048 BASIC METABOLIC PNL TOTAL CA: CPT

## 2019-10-17 PROCEDURE — 36415 COLL VENOUS BLD VENIPUNCTURE: CPT

## 2019-10-22 ENCOUNTER — TELEPHONE (OUTPATIENT)
Dept: FAMILY MEDICINE CLINIC | Age: 84
End: 2019-10-22

## 2019-10-22 DIAGNOSIS — E87.1 HYPONATREMIA: Primary | ICD-10-CM

## 2019-10-23 ENCOUNTER — HOSPITAL ENCOUNTER (OUTPATIENT)
Age: 84
Discharge: HOME | End: 2019-10-23
Payer: MEDICARE

## 2019-10-23 DIAGNOSIS — E87.1 HYPONATREMIA: ICD-10-CM

## 2019-10-23 LAB
ANION GAP SERPL CALCULATED.3IONS-SCNC: 20 MMOL/L (ref 9–17)
BUN BLDV-MCNC: 27 MG/DL (ref 8–23)
BUN/CREAT BLD: ABNORMAL (ref 9–20)
CALCIUM SERPL-MCNC: 9.8 MG/DL (ref 8.6–10.4)
CHLORIDE BLD-SCNC: 93 MMOL/L (ref 98–107)
CO2: 20 MMOL/L (ref 20–31)
CREAT SERPL-MCNC: 1.51 MG/DL (ref 0.5–0.9)
GFR AFRICAN AMERICAN: 40 ML/MIN
GFR NON-AFRICAN AMERICAN: 33 ML/MIN
GFR SERPL CREATININE-BSD FRML MDRD: ABNORMAL ML/MIN/{1.73_M2}
GFR SERPL CREATININE-BSD FRML MDRD: ABNORMAL ML/MIN/{1.73_M2}
GLUCOSE BLD-MCNC: 129 MG/DL (ref 70–99)
POTASSIUM SERPL-SCNC: 3.6 MMOL/L (ref 3.7–5.3)
SODIUM BLD-SCNC: 133 MMOL/L (ref 135–144)

## 2019-10-23 PROCEDURE — 80048 BASIC METABOLIC PNL TOTAL CA: CPT

## 2019-10-23 PROCEDURE — 36415 COLL VENOUS BLD VENIPUNCTURE: CPT

## 2019-10-24 ENCOUNTER — OFFICE VISIT (OUTPATIENT)
Dept: FAMILY MEDICINE CLINIC | Age: 84
End: 2019-10-24
Payer: MEDICARE

## 2019-10-24 VITALS
HEIGHT: 60 IN | DIASTOLIC BLOOD PRESSURE: 81 MMHG | RESPIRATION RATE: 16 BRPM | OXYGEN SATURATION: 99 % | WEIGHT: 136.6 LBS | HEART RATE: 94 BPM | BODY MASS INDEX: 26.82 KG/M2 | SYSTOLIC BLOOD PRESSURE: 123 MMHG | TEMPERATURE: 98.1 F

## 2019-10-24 DIAGNOSIS — N18.30 CKD (CHRONIC KIDNEY DISEASE) STAGE 3, GFR 30-59 ML/MIN (HCC): ICD-10-CM

## 2019-10-24 DIAGNOSIS — M1A.0720 IDIOPATHIC CHRONIC GOUT OF LEFT FOOT WITHOUT TOPHUS: ICD-10-CM

## 2019-10-24 DIAGNOSIS — I10 ESSENTIAL HYPERTENSION: Primary | ICD-10-CM

## 2019-10-24 PROCEDURE — G8427 DOCREV CUR MEDS BY ELIG CLIN: HCPCS | Performed by: FAMILY MEDICINE

## 2019-10-24 PROCEDURE — 99213 OFFICE O/P EST LOW 20 MIN: CPT | Performed by: FAMILY MEDICINE

## 2019-10-24 PROCEDURE — 1090F PRES/ABSN URINE INCON ASSESS: CPT | Performed by: FAMILY MEDICINE

## 2019-10-24 PROCEDURE — G8482 FLU IMMUNIZE ORDER/ADMIN: HCPCS | Performed by: FAMILY MEDICINE

## 2019-10-24 PROCEDURE — 4040F PNEUMOC VAC/ADMIN/RCVD: CPT | Performed by: FAMILY MEDICINE

## 2019-10-24 PROCEDURE — G8417 CALC BMI ABV UP PARAM F/U: HCPCS | Performed by: FAMILY MEDICINE

## 2019-10-24 PROCEDURE — 1123F ACP DISCUSS/DSCN MKR DOCD: CPT | Performed by: FAMILY MEDICINE

## 2019-10-24 PROCEDURE — G8399 PT W/DXA RESULTS DOCUMENT: HCPCS | Performed by: FAMILY MEDICINE

## 2019-10-24 PROCEDURE — 1036F TOBACCO NON-USER: CPT | Performed by: FAMILY MEDICINE

## 2019-10-24 RX ORDER — ALLOPURINOL 100 MG/1
100 TABLET ORAL DAILY
COMMUNITY
End: 2021-03-15 | Stop reason: SDUPTHER

## 2019-10-24 RX ORDER — POTASSIUM CHLORIDE 750 MG/1
10 TABLET, EXTENDED RELEASE ORAL DAILY
Qty: 30 TABLET | Refills: 0 | Status: SHIPPED | OUTPATIENT
Start: 2019-10-24 | End: 2020-08-14

## 2019-10-24 ASSESSMENT — ENCOUNTER SYMPTOMS
NAUSEA: 0
SHORTNESS OF BREATH: 0
BACK PAIN: 0
SORE THROAT: 0
ABDOMINAL PAIN: 0

## 2020-08-11 ASSESSMENT — PATIENT HEALTH QUESTIONNAIRE - PHQ9
SUM OF ALL RESPONSES TO PHQ QUESTIONS 1-9: 0
SUM OF ALL RESPONSES TO PHQ QUESTIONS 1-9: 0
SUM OF ALL RESPONSES TO PHQ9 QUESTIONS 1 & 2: 0
2. FEELING DOWN, DEPRESSED OR HOPELESS: 0
1. LITTLE INTEREST OR PLEASURE IN DOING THINGS: 0

## 2020-08-11 NOTE — PROGRESS NOTES
Visit Information    Have you changed or started any medications since your last visit including any over-the-counter medicines, vitamins, or herbal medicines? no   Are you having any side effects from any of your medications? -  no  Have you stopped taking any of your medications? Is so, why? -  no    Have you seen any other physician or provider since your last visit? No  Have you had any other diagnostic tests since your last visit? No  Have you been seen in the emergency room and/or had an admission to a hospital since we last saw you? No  Have you had your routine dental cleaning in the past 6 months? no    Have you activated your [employer] account? If not, what are your barriers?  No:      Patient Care Team:  Aleks Mejia MD as PCP - General (Family Medicine)    Medical History Review  Past Medical, Family, and Social History reviewed and does contribute to the patient presenting condition    Health Maintenance   Topic Date Due    DTaP/Tdap/Td vaccine (1 - Tdap) 11/29/1954    Shingles Vaccine (1 of 2) 11/29/1986    Annual Wellness Visit (AWV)  06/20/2019    Flu vaccine (1) 09/01/2020    Lipid screen  10/14/2020    Potassium monitoring  10/23/2020    Creatinine monitoring  10/23/2020    DEXA (modify frequency per FRAX score)  Completed    Pneumococcal 65+ years Vaccine  Completed    Hepatitis A vaccine  Aged Out    Hepatitis B vaccine  Aged Out    Hib vaccine  Aged Out    Meningococcal (ACWY) vaccine  Aged Out

## 2020-08-12 ENCOUNTER — VIRTUAL VISIT (OUTPATIENT)
Dept: FAMILY MEDICINE CLINIC | Age: 85
End: 2020-08-12
Payer: MEDICARE

## 2020-08-12 PROBLEM — R73.9 HYPERGLYCEMIA: Status: ACTIVE | Noted: 2020-08-12

## 2020-08-12 PROBLEM — D64.9 ANEMIA: Status: ACTIVE | Noted: 2020-08-12

## 2020-08-12 PROBLEM — H92.01 ACUTE EAR PAIN, RIGHT: Status: ACTIVE | Noted: 2020-08-12

## 2020-08-12 PROCEDURE — 99443 PR PHYS/QHP TELEPHONE EVALUATION 21-30 MIN: CPT | Performed by: FAMILY MEDICINE

## 2020-08-12 RX ORDER — NEOMYCIN SULFATE, POLYMYXIN B SULFATE AND HYDROCORTISONE 10; 3.5; 1 MG/ML; MG/ML; [USP'U]/ML
3 SUSPENSION/ DROPS AURICULAR (OTIC) 4 TIMES DAILY
Qty: 1 BOTTLE | Refills: 0 | Status: SHIPPED | OUTPATIENT
Start: 2020-08-12 | End: 2020-08-14 | Stop reason: DRUGHIGH

## 2020-08-12 NOTE — PATIENT INSTRUCTIONS
Patient Education        Low Sodium Diet (2,000 Milligram): Care Instructions  Your Care Instructions     Too much sodium causes your body to hold on to extra water. This can raise your blood pressure and force your heart and kidneys to work harder. In very serious cases, this could cause you to be put in the hospital. It might even be life-threatening. By limiting sodium, you will feel better and lower your risk of serious problems. The most common source of sodium is salt. People get most of the salt in their diet from canned, prepared, and packaged foods. Fast food and restaurant meals also are very high in sodium. Your doctor will probably limit your sodium to less than 2,000 milligrams (mg) a day. This limit counts all the sodium in prepared and packaged foods and any salt you add to your food. Follow-up care is a key part of your treatment and safety. Be sure to make and go to all appointments, and call your doctor if you are having problems. It's also a good idea to know your test results and keep a list of the medicines you take. How can you care for yourself at home? Read food labels  · Read labels on cans and food packages. The labels tell you how much sodium is in each serving. Make sure that you look at the serving size. If you eat more than the serving size, you have eaten more sodium. · Food labels also tell you the Percent Daily Value for sodium. Choose products with low Percent Daily Values for sodium. · Be aware that sodium can come in forms other than salt, including monosodium glutamate (MSG), sodium citrate, and sodium bicarbonate (baking soda). MSG is often added to Asian food. When you eat out, you can sometimes ask for food without MSG or added salt. Buy low-sodium foods  · Buy foods that are labeled \"unsalted\" (no salt added), \"sodium-free\" (less than 5 mg of sodium per serving), or \"low-sodium\" (less than 140 mg of sodium per serving).  Foods labeled \"reduced-sodium\" and \"light sodium\" may still have too much sodium. Be sure to read the label to see how much sodium you are getting. · Buy fresh vegetables, or frozen vegetables without added sauces. Buy low-sodium versions of canned vegetables, soups, and other canned goods. Prepare low-sodium meals  · Cut back on the amount of salt you use in cooking. This will help you adjust to the taste. Do not add salt after cooking. One teaspoon of salt has about 2,300 mg of sodium. · Take the salt shaker off the table. · Flavor your food with garlic, lemon juice, onion, vinegar, herbs, and spices. Do not use soy sauce, lite soy sauce, steak sauce, onion salt, garlic salt, celery salt, mustard, or ketchup on your food. · Use low-sodium salad dressings, sauces, and ketchup. Or make your own salad dressings and sauces without adding salt. · Use less salt (or none) when recipes call for it. You can often use half the salt a recipe calls for without losing flavor. Other foods such as rice, pasta, and grains do not need added salt. · Rinse canned vegetables, and cook them in fresh water. This removes some--but not all--of the salt. · Avoid water that is naturally high in sodium or that has been treated with water softeners, which add sodium. Call your local water company to find out the sodium content of your water supply. If you buy bottled water, read the label and choose a sodium-free brand. Avoid high-sodium foods  · Avoid eating:  ? Smoked, cured, salted, and canned meat, fish, and poultry. ? Ham, anderson, hot dogs, and luncheon meats. ? Regular, hard, and processed cheese and regular peanut butter. ? Crackers with salted tops, and other salted snack foods such as pretzels, chips, and salted popcorn. ? Frozen prepared meals, unless labeled low-sodium. ? Canned and dried soups, broths, and bouillon, unless labeled sodium-free or low-sodium. ? Canned vegetables, unless labeled sodium-free or low-sodium. ?  [employer] fries, pizza, tacos, and other fast foods.  ? Pickles, olives, ketchup, and other condiments, especially soy sauce, unless labeled sodium-free or low-sodium. Where can you learn more? Go to https://[email or web address]. org and sign in to your [employer] account. Enter W988 in the [facility] box to learn more about \"Low Sodium Diet (2,000 Milligram): Care Instructions. \"     If you do not have an account, please click on the \"Sign Up Now\" link. Current as of: August 22, 2019               Content Version: 12.5  © 1274-5657 [employer]. Care instructions adapted under license by [facility] ([facility]). If you have questions about a medical condition or this instruction, always ask your healthcare professional. [address] any warranty or liability for your use of this information. Patient Education        High Cholesterol: Care Instructions  Your Care Instructions     Cholesterol is a type of fat in your blood. It is needed for many body functions, such as making new cells. Cholesterol is made by your body. It also comes from food you eat. High cholesterol means that you have too much of the fat in your blood. This raises your risk of a heart attack and stroke. LDL and HDL are part of your total cholesterol. LDL is the \"bad\" cholesterol. High LDL can raise your risk for heart disease, heart attack, and stroke. HDL is the \"good\" cholesterol. It helps clear bad cholesterol from the body. High HDL is linked with a lower risk of heart disease, heart attack, and stroke. Your cholesterol levels help your doctor find out your risk for having a heart attack or stroke. You and your doctor can talk about whether you need to lower your risk and what treatment is best for you. A heart-healthy lifestyle along with medicines can help lower your cholesterol and your risk. The way you choose to lower your risk will depend on how high your risk is for heart attack and stroke.  It will also depend on how you feel instructions adapted under license by [facility] ([facility]). If you have questions about a medical condition or this instruction, always ask your healthcare professional. [address] any warranty or liability for your use of this information.

## 2020-08-12 NOTE — PROGRESS NOTES
Patient requested office visit, was scheduled for virtual visit phone call , Dennis Andre was unable to use doxy. me or MyChart. Dennis Andre is a 81 y.o. female evaluated via telephone on  8/12/20    Consent:  She is aware that that she may receive a bill for this telephone service, depending on her insurance coverage, and has provided verbal consent to proceed: Yes      Documentation and HPI:  I communicated with the patient about: Establish Care; Hypertension; Hyperlipidemia; Fatigue; and Otalgia (Right side, for 1 week)       Amee Quintanilla reports:      Feeling tired and lost hearing in the right ear for 1 week. Patient also reports right ear pain and sudden hearing loss for a week. She denies any injury. She denies going swimming. She denies washing her hair recently and water coming into her ear. I also depressed over the right ear and to see if it hurts and she says is hurting. She does not know if she has wax or not. Is not getting better  Patient is worried about worsening fatigue for the past week and not feeling right. She would like blood work    Hypertension and chronic kidney disease stage III:    she  is not exercising and is adherent to low salt diet. Blood pressure is well controlled at home. Cardiac symptoms fatigue and lower extremity edema. Patient denies calf tenderness when squeezing her legs today during the visit   Patient denies chest pain, chest pressure/discomfort, claudication, dyspnea, exertional chest pressure/discomfort, irregular heart beat, near-syncope, orthopnea, palpitations, paroxysmal nocturnal dyspnea, syncope and tachypnea. Cardiovascular risk factors: advanced age (older than 54 for men, 72 for women), dyslipidemia and hypertension. Use of agents associated with hypertension: none. History of target organ damage: chronic kidney disease. She does not take NSAIDs  Denies frequency, urgency or dysuria. Chronic kidney disease stage III worsening. chloride (KLOR-CON M) 10 MEQ extended release tablet Take 1 tablet by mouth daily Yes Jacquelyn Pina MD   allopurinol (ZYLOPRIM) 100 MG tablet Take 100 mg by mouth daily 2 tablets once a day Yes Historical Provider, MD   omeprazole 20 MG EC tablet Take 1 tablet by mouth daily Yes Jacquelyn Pina MD   losartan (COZAAR) 100 MG tablet Take 1 tablet by mouth daily Yes Jacquelyn Pina MD   hydrALAZINE (APRESOLINE) 25 MG tablet TAKE 1 TABLET THREE TIMES A DAY Yes Jacquelyn Pina MD   furosemide (LASIX) 20 MG tablet Take 1 tablet by mouth daily Yes Jacquelyn Pina MD   cloNIDine (CATAPRES) 0.2 MG tablet Take 1 tablet by mouth daily Yes Jacquelyn Pina MD   atorvastatin (LIPITOR) 20 MG tablet Take 1 tablet by mouth daily Yes Jacquelyn Pina MD   aspirin 81 MG tablet Take 1 tablet by mouth daily Yes Jacquelyn Pina MD   Multiple Vitamins-Minerals (CENTRUM SILVER PO) Take 1 tablet by mouth daily Yes Historical MD Scarlett   Multiple Vitamins-Minerals (OCUVITE ADULT 50+ PO) Take 1 tablet by mouth daily Yes Historical Provider, MD           -vital signs stable and within normal limits except mildly overweight, BMI 26.68 kg/M2  Patient-Reported Vitals 8/12/2020   Patient-Reported Weight 144 pounds   Patient-Reported Height 5 foot   Patient-Reported Systolic 676   Patient-Reported Diastolic 70      Most recent labs reviewed consistently  Anemia  Chronic kidney disease stage 3 worsening   hyponatremia  High uric increased    Otherwise labs within normal limits  Lab Results   Component Value Date    URICACID 7.5 (H) 10/14/2019       Lab Results   Component Value Date    WBC 5.7 10/14/2019    HGB 11.3 (L) 10/14/2019    HCT 33.7 (L) 10/14/2019    MCV 97.5 10/14/2019     10/14/2019       Lab Results   Component Value Date     10/23/2019    K 3.6 10/23/2019    CL 93 10/23/2019    CO2 20 10/23/2019    BUN 27 10/23/2019    CREATININE 1.51 10/23/2019    GLUCOSE 129 10/23/2019    GLUCOSE 88 05/29/2012    CALCIUM 9.8 10/23/2019      No results found for: LABA1C      Lab Results   Component Value Date    ALT 12 10/14/2019    AST 32 (H) 10/14/2019    ALKPHOS 88 10/14/2019    BILITOT 1.48 (H) 10/14/2019       Lab Results   Component Value Date    TSH 4.11 02/06/2015       Lab Results   Component Value Date    CHOL 179 10/14/2019    CHOL 138 06/18/2018    CHOL 147 08/26/2017     Lab Results   Component Value Date    TRIG 73 10/14/2019    TRIG 65 06/18/2018    TRIG 82 08/26/2017     Lab Results   Component Value Date     10/14/2019    HDL 64 06/18/2018     08/26/2017     Lab Results   Component Value Date    LDLCHOLESTEROL 10 10/14/2019    LDLCHOLESTEROL 61 06/18/2018    LDLCHOLESTEROL 24 08/26/2017       Lab Results   Component Value Date    CHOLHDLRATIO 1.2 10/14/2019    CHOLHDLRATIO 2.2 06/18/2018    CHOLHDLRATIO 1.4 08/26/2017                Details of this discussion including any medical advice provided:   1. Acute ear pain, right  Failing to change as expected. - neomycin-polymyxin-hydrocortisone (CORTISPORIN) 3.5-62017-8 otic suspension; Place 3 drops into the right ear 4 times daily OK to substitute. For 10 days  Dispense: 1 Bottle; Refill: 0  She will follow-up in person in the office in about 1 week to evaluate for wax impaction    2. Benign hypertension with CKD (chronic kidney disease) stage III (HCC)  Worsening chronic kidney disease stage III  Well controlled hypertension per her report. Continue current treatment. Will recheck labs. - Comprehensive Metabolic Panel; Future  - Urinalysis Reflex to Culture; Future  - TSH without Reflex; Future  - Magnesium; Future  - Phosphorus; Future  - Vitamin D 25 Hydroxy; Future  - CBC Auto Differential; Future  Discussed low salt diet and BP and pulse monitoring daily    3. Chronic gout due to renal impairment without tophus, unspecified site  Stable  Continue allopurinol  - Uric Acid; Future    4.  Hyperglycemia  Low-carb diet advised  - Hemoglobin A1C; Future    5. Anemia, unspecified type  Unsure if worsening or improving we need to reevaluate with labs  Likely secondary to chronic kidney disease stage III  - Vitamin B12 & Folate; Future  - Iron and TIBC; Future  - Ferritin; Future  - CBC Auto Differential; Future  Might need referral to nephrology and GI specialist        I affirm this is a Patient Initiated Episode with an Established Patient who has not had a related appointment within my department in the past 7 days or scheduled within the next 24 hours. Patient location's was at home, and provider's location was at the primary practice location. Return in about 8 weeks (around 10/7/2020) for AWM, PHQ9, MINICOG, HRA QUESTIONS.   Patient needs appointment in 1 week face-to-face with me or 1 of my nurse practitioner for the right ear pain and hearing loss, possibly wax    Future Appointments   Date Time Provider Eric Vizcaino   8/19/2020  2:15 PM ELIZABET Mills - CNP [id number]   12/4/2020  2:00 PM Yennifer Barrios MD [facility]        Patient identification was verified at the start of the visit: Yes    Total Time spent: minutes: 21-30 minutes    Electronically signed by Yennifer Barrios MD on 8/12/2020  at 10:54 PM

## 2020-08-14 ENCOUNTER — APPOINTMENT (OUTPATIENT)
Dept: GENERAL RADIOLOGY | Age: 85
DRG: 683 | End: 2020-08-14
Payer: MEDICARE

## 2020-08-14 ENCOUNTER — HOSPITAL ENCOUNTER (OUTPATIENT)
Age: 85
Discharge: HOME | DRG: 683 | End: 2020-08-14
Payer: MEDICARE

## 2020-08-14 ENCOUNTER — APPOINTMENT (OUTPATIENT)
Dept: CT IMAGING | Age: 85
DRG: 683 | End: 2020-08-14
Payer: MEDICARE

## 2020-08-14 ENCOUNTER — HOSPITAL ENCOUNTER (INPATIENT)
Age: 85
LOS: 3 days | Discharge: HOME | DRG: 683 | End: 2020-08-17
Attending: STUDENT IN AN ORGANIZED HEALTH CARE EDUCATION/TRAINING PROGRAM | Admitting: INTERNAL MEDICINE
Payer: MEDICARE

## 2020-08-14 PROBLEM — N17.9 ACUTE KIDNEY INJURY SUPERIMPOSED ON CHRONIC KIDNEY DISEASE (HCC): Status: ACTIVE | Noted: 2020-08-14

## 2020-08-14 PROBLEM — N18.9 ACUTE KIDNEY INJURY SUPERIMPOSED ON CHRONIC KIDNEY DISEASE (HCC): Status: ACTIVE | Noted: 2020-08-14

## 2020-08-14 LAB
-: ABNORMAL
-: ABNORMAL
ABSOLUTE EOS #: 0 K/UL (ref 0–0.4)
ABSOLUTE EOS #: 0 K/UL (ref 0–0.4)
ABSOLUTE IMMATURE GRANULOCYTE: ABNORMAL K/UL (ref 0–0.3)
ABSOLUTE IMMATURE GRANULOCYTE: ABNORMAL K/UL (ref 0–0.3)
ABSOLUTE LYMPH #: 0.9 K/UL (ref 1–4.8)
ABSOLUTE LYMPH #: 0.9 K/UL (ref 1–4.8)
ABSOLUTE MONO #: 0.5 K/UL (ref 0.1–1.3)
ABSOLUTE MONO #: 0.5 K/UL (ref 0.1–1.3)
ACETAMINOPHEN LEVEL: <5 UG/ML (ref 10–30)
ALBUMIN SERPL-MCNC: 4.3 G/DL (ref 3.5–5.2)
ALBUMIN SERPL-MCNC: 4.4 G/DL (ref 3.5–5.2)
ALBUMIN/GLOBULIN RATIO: ABNORMAL (ref 1–2.5)
ALBUMIN/GLOBULIN RATIO: ABNORMAL (ref 1–2.5)
ALLEN TEST: ABNORMAL
ALP BLD-CCNC: 104 U/L (ref 35–104)
ALP BLD-CCNC: 110 U/L (ref 35–104)
ALT SERPL-CCNC: 11 U/L (ref 5–33)
ALT SERPL-CCNC: 12 U/L (ref 5–33)
AMORPHOUS: ABNORMAL
AMORPHOUS: ABNORMAL
ANION GAP SERPL CALCULATED.3IONS-SCNC: 26 MMOL/L (ref 9–17)
ANION GAP SERPL CALCULATED.3IONS-SCNC: 28 MMOL/L (ref 9–17)
AST SERPL-CCNC: 39 U/L
AST SERPL-CCNC: 44 U/L
BACTERIA: ABNORMAL
BACTERIA: ABNORMAL
BASOPHILS # BLD: 1 % (ref 0–2)
BASOPHILS # BLD: 1 % (ref 0–2)
BASOPHILS ABSOLUTE: 0.1 K/UL (ref 0–0.2)
BASOPHILS ABSOLUTE: 0.1 K/UL (ref 0–0.2)
BETA-HYDROXYBUTYRATE: 6.02 MMOL/L (ref 0.02–0.27)
BILIRUB SERPL-MCNC: 0.82 MG/DL (ref 0.3–1.2)
BILIRUB SERPL-MCNC: 0.83 MG/DL (ref 0.3–1.2)
BILIRUBIN URINE: ABNORMAL
BILIRUBIN URINE: ABNORMAL
BUN BLDV-MCNC: 62 MG/DL (ref 8–23)
BUN BLDV-MCNC: 62 MG/DL (ref 8–23)
BUN/CREAT BLD: ABNORMAL (ref 9–20)
BUN/CREAT BLD: ABNORMAL (ref 9–20)
CALCIUM IONIZED: 1.08 MMOL/L (ref 1.13–1.33)
CALCIUM SERPL-MCNC: 8.6 MG/DL (ref 8.6–10.4)
CALCIUM SERPL-MCNC: 8.6 MG/DL (ref 8.6–10.4)
CARBOXYHEMOGLOBIN: 2.2 % (ref 0–5)
CASTS UA: ABNORMAL /LPF
CASTS UA: ABNORMAL /LPF
CHLORIDE BLD-SCNC: 82 MMOL/L (ref 98–107)
CHLORIDE BLD-SCNC: 83 MMOL/L (ref 98–107)
CO2: 12 MMOL/L (ref 20–31)
CO2: 13 MMOL/L (ref 20–31)
COLOR: YELLOW
COLOR: YELLOW
COMMENT UA: ABNORMAL
COMMENT UA: ABNORMAL
CREAT SERPL-MCNC: 3.92 MG/DL (ref 0.5–0.9)
CREAT SERPL-MCNC: 3.99 MG/DL (ref 0.5–0.9)
CRYSTALS, UA: ABNORMAL /HPF
CRYSTALS, UA: ABNORMAL /HPF
DIFFERENTIAL TYPE: ABNORMAL
DIFFERENTIAL TYPE: ABNORMAL
EOSINOPHILS RELATIVE PERCENT: 0 % (ref 0–4)
EOSINOPHILS RELATIVE PERCENT: 0 % (ref 0–4)
EPITHELIAL CELLS UA: ABNORMAL /HPF
EPITHELIAL CELLS UA: ABNORMAL /HPF
ESTIMATED AVERAGE GLUCOSE: 94 MG/DL
ETHANOL PERCENT: <0.01 %
ETHANOL: <10 MG/DL
FERRITIN: 136 UG/L (ref 13–150)
FIO2: ABNORMAL
FOLATE: 15.3 NG/ML
GFR AFRICAN AMERICAN: 13 ML/MIN
GFR AFRICAN AMERICAN: 13 ML/MIN
GFR NON-AFRICAN AMERICAN: 11 ML/MIN
GFR NON-AFRICAN AMERICAN: 11 ML/MIN
GFR SERPL CREATININE-BSD FRML MDRD: ABNORMAL ML/MIN/{1.73_M2}
GLUCOSE BLD-MCNC: 113 MG/DL (ref 65–105)
GLUCOSE BLD-MCNC: 52 MG/DL (ref 70–99)
GLUCOSE BLD-MCNC: 53 MG/DL (ref 65–105)
GLUCOSE BLD-MCNC: 61 MG/DL (ref 70–99)
GLUCOSE BLD-MCNC: 63 MG/DL (ref 65–105)
GLUCOSE URINE: NEGATIVE
GLUCOSE URINE: NEGATIVE
HBA1C MFR BLD: 4.9 % (ref 4–6)
HCO3 VENOUS: 14 MMOL/L (ref 24–30)
HCT VFR BLD CALC: 33.4 % (ref 36–46)
HCT VFR BLD CALC: 34.4 % (ref 36–46)
HEMOGLOBIN: 11.2 G/DL (ref 12–16)
HEMOGLOBIN: 11.7 G/DL (ref 12–16)
IMMATURE GRANULOCYTES: ABNORMAL %
IMMATURE GRANULOCYTES: ABNORMAL %
INR BLD: 1
IRON SATURATION: 61 % (ref 20–55)
IRON: 177 UG/DL (ref 37–145)
KETONES, URINE: ABNORMAL
KETONES, URINE: ABNORMAL
LACTIC ACID, WHOLE BLOOD: NORMAL MMOL/L (ref 0.7–2.1)
LACTIC ACID: 1.6 MMOL/L (ref 0.5–2.2)
LEUKOCYTE ESTERASE, URINE: ABNORMAL
LEUKOCYTE ESTERASE, URINE: ABNORMAL
LIPASE: 43 U/L (ref 13–60)
LYMPHOCYTES # BLD: 13 % (ref 24–44)
LYMPHOCYTES # BLD: 9 % (ref 24–44)
MAGNESIUM: 1.8 MG/DL (ref 1.6–2.6)
MAGNESIUM: 1.8 MG/DL (ref 1.6–2.6)
MCH RBC QN AUTO: 32.9 PG (ref 26–34)
MCH RBC QN AUTO: 33.7 PG (ref 26–34)
MCHC RBC AUTO-ENTMCNC: 33.5 G/DL (ref 31–37)
MCHC RBC AUTO-ENTMCNC: 33.9 G/DL (ref 31–37)
MCV RBC AUTO: 98 FL (ref 80–100)
MCV RBC AUTO: 99.5 FL (ref 80–100)
METHEMOGLOBIN: 0.3 % (ref 0–1.9)
MODE: ABNORMAL
MONOCYTES # BLD: 5 % (ref 1–7)
MONOCYTES # BLD: 6 % (ref 1–7)
MUCUS: ABNORMAL
MUCUS: ABNORMAL
NEGATIVE BASE EXCESS, VEN: 12.1 MMOL/L (ref 0–2)
NITRITE, URINE: NEGATIVE
NITRITE, URINE: NEGATIVE
NOTIFICATION TIME: ABNORMAL
NOTIFICATION: ABNORMAL
NRBC AUTOMATED: ABNORMAL PER 100 WBC
NRBC AUTOMATED: ABNORMAL PER 100 WBC
O2 DEVICE/FLOW/%: ABNORMAL
O2 SAT, VEN: 59.5 % (ref 60–85)
OSMOLALITY URINE: 274 MOSM/KG (ref 80–1300)
OTHER OBSERVATIONS UA: ABNORMAL
OTHER OBSERVATIONS UA: ABNORMAL
OXYHEMOGLOBIN: ABNORMAL % (ref 95–98)
PARTIAL THROMBOPLASTIN TIME: 28.9 SEC (ref 24–36)
PATIENT TEMP: 37
PCO2, VEN, TEMP ADJ: ABNORMAL MMHG (ref 39–55)
PCO2, VEN: 27.5 (ref 39–55)
PDW BLD-RTO: 16.9 % (ref 11.5–14.9)
PDW BLD-RTO: 17.5 % (ref 11.5–14.9)
PEEP/CPAP: ABNORMAL
PH UA: 5.5 (ref 5–8)
PH UA: 5.5 (ref 5–8)
PH VENOUS: 7.32 (ref 7.32–7.42)
PH, VEN, TEMP ADJ: ABNORMAL (ref 7.32–7.42)
PHOSPHORUS: 3.6 MG/DL (ref 2.6–4.5)
PHOSPHORUS: 4.4 MG/DL (ref 2.6–4.5)
PLATELET # BLD: 311 K/UL (ref 150–450)
PLATELET # BLD: 326 K/UL (ref 150–450)
PLATELET ESTIMATE: ABNORMAL
PLATELET ESTIMATE: ABNORMAL
PMV BLD AUTO: 10 FL (ref 6–12)
PMV BLD AUTO: 9.9 FL (ref 6–12)
PO2, VEN, TEMP ADJ: ABNORMAL MMHG (ref 30–50)
PO2, VEN: 31.6 (ref 30–50)
POSITIVE BASE EXCESS, VEN: ABNORMAL MMOL/L (ref 0–2)
POTASSIUM SERPL-SCNC: 4.4 MMOL/L (ref 3.7–5.3)
POTASSIUM SERPL-SCNC: 4.7 MMOL/L (ref 3.7–5.3)
PROTEIN UA: NEGATIVE
PROTEIN UA: NEGATIVE
PROTHROMBIN TIME: 13 SEC (ref 11.8–14.6)
PSV: ABNORMAL
PT. POSITION: ABNORMAL
RBC # BLD: 3.41 M/UL (ref 4–5.2)
RBC # BLD: 3.46 M/UL (ref 4–5.2)
RBC # BLD: ABNORMAL 10*6/UL
RBC # BLD: ABNORMAL 10*6/UL
RBC UA: ABNORMAL /HPF
RBC UA: ABNORMAL /HPF
RENAL EPITHELIAL, UA: ABNORMAL /HPF
RENAL EPITHELIAL, UA: ABNORMAL /HPF
RESPIRATORY RATE: ABNORMAL
SALICYLATE LEVEL: <1 MG/DL (ref 3–10)
SAMPLE SITE: ABNORMAL
SARS-COV-2, PCR: NORMAL
SARS-COV-2, RAPID: NOT DETECTED
SARS-COV-2: NORMAL
SEG NEUTROPHILS: 80 % (ref 36–66)
SEG NEUTROPHILS: 85 % (ref 36–66)
SEGMENTED NEUTROPHILS ABSOLUTE COUNT: 5.8 K/UL (ref 1.3–9.1)
SEGMENTED NEUTROPHILS ABSOLUTE COUNT: 8.1 K/UL (ref 1.3–9.1)
SERUM OSMOLALITY: 282 MOSM/KG (ref 275–295)
SET RATE: ABNORMAL
SODIUM BLD-SCNC: 121 MMOL/L (ref 135–144)
SODIUM BLD-SCNC: 123 MMOL/L (ref 135–144)
SOURCE: NORMAL
SPECIFIC GRAVITY UA: 1.01 (ref 1–1.03)
SPECIFIC GRAVITY UA: 1.01 (ref 1–1.03)
TEXT FOR RESPIRATORY: ABNORMAL
TOTAL HB: ABNORMAL G/DL (ref 12–16)
TOTAL IRON BINDING CAPACITY: 290 UG/DL (ref 250–450)
TOTAL PROTEIN: 6.8 G/DL (ref 6.4–8.3)
TOTAL PROTEIN: 7.1 G/DL (ref 6.4–8.3)
TOTAL RATE: ABNORMAL
TOXIC TRICYCLIC SC,BLOOD: ABNORMAL
TRICHOMONAS: ABNORMAL
TRICHOMONAS: ABNORMAL
TRICYCLIC ANTIDEP,URINE: NEGATIVE
TROPONIN INTERP: ABNORMAL
TROPONIN INTERP: ABNORMAL
TROPONIN T: ABNORMAL NG/ML
TROPONIN T: ABNORMAL NG/ML
TROPONIN, HIGH SENSITIVITY: 47 NG/L (ref 0–14)
TROPONIN, HIGH SENSITIVITY: 51 NG/L (ref 0–14)
TSH SERPL DL<=0.05 MIU/L-ACNC: 4.17 MIU/L (ref 0.3–5)
TURBIDITY: ABNORMAL
TURBIDITY: ABNORMAL
UNSATURATED IRON BINDING CAPACITY: 113 UG/DL (ref 112–347)
URIC ACID: 5.8 MG/DL (ref 2.4–5.7)
URINE HGB: NEGATIVE
URINE HGB: NEGATIVE
UROBILINOGEN, URINE: NORMAL
UROBILINOGEN, URINE: NORMAL
VITAMIN B-12: 449 PG/ML (ref 232–1245)
VITAMIN D 25-HYDROXY: 46.2 NG/ML (ref 30–100)
VT: ABNORMAL
WBC # BLD: 7.3 K/UL (ref 3.5–11)
WBC # BLD: 9.6 K/UL (ref 3.5–11)
WBC # BLD: ABNORMAL 10*3/UL
WBC # BLD: ABNORMAL 10*3/UL
WBC UA: ABNORMAL /HPF
WBC UA: ABNORMAL /HPF
YEAST: ABNORMAL
YEAST: ABNORMAL

## 2020-08-14 PROCEDURE — 82728 ASSAY OF FERRITIN: CPT

## 2020-08-14 PROCEDURE — 84443 ASSAY THYROID STIM HORMONE: CPT

## 2020-08-14 PROCEDURE — 70450 CT HEAD/BRAIN W/O DYE: CPT

## 2020-08-14 PROCEDURE — 6360000002 HC RX W HCPCS: Performed by: EMERGENCY MEDICINE

## 2020-08-14 PROCEDURE — G0480 DRUG TEST DEF 1-7 CLASSES: HCPCS

## 2020-08-14 PROCEDURE — 84100 ASSAY OF PHOSPHORUS: CPT

## 2020-08-14 PROCEDURE — 2580000003 HC RX 258: Performed by: EMERGENCY MEDICINE

## 2020-08-14 PROCEDURE — 82800 BLOOD PH: CPT

## 2020-08-14 PROCEDURE — 93005 ELECTROCARDIOGRAM TRACING: CPT | Performed by: EMERGENCY MEDICINE

## 2020-08-14 PROCEDURE — 82805 BLOOD GASES W/O2 SATURATION: CPT

## 2020-08-14 PROCEDURE — 83550 IRON BINDING TEST: CPT

## 2020-08-14 PROCEDURE — 36415 COLL VENOUS BLD VENIPUNCTURE: CPT

## 2020-08-14 PROCEDURE — 80307 DRUG TEST PRSMV CHEM ANLYZR: CPT

## 2020-08-14 PROCEDURE — 2580000003 HC RX 258: Performed by: INTERNAL MEDICINE

## 2020-08-14 PROCEDURE — 85610 PROTHROMBIN TIME: CPT

## 2020-08-14 PROCEDURE — 2500000003 HC RX 250 WO HCPCS: Performed by: EMERGENCY MEDICINE

## 2020-08-14 PROCEDURE — 83930 ASSAY OF BLOOD OSMOLALITY: CPT

## 2020-08-14 PROCEDURE — 71045 X-RAY EXAM CHEST 1 VIEW: CPT

## 2020-08-14 PROCEDURE — 85025 COMPLETE CBC W/AUTO DIFF WBC: CPT

## 2020-08-14 PROCEDURE — 99285 EMERGENCY DEPT VISIT HI MDM: CPT

## 2020-08-14 PROCEDURE — 80053 COMPREHEN METABOLIC PANEL: CPT

## 2020-08-14 PROCEDURE — 82607 VITAMIN B-12: CPT

## 2020-08-14 PROCEDURE — 82947 ASSAY GLUCOSE BLOOD QUANT: CPT

## 2020-08-14 PROCEDURE — 82010 KETONE BODYS QUAN: CPT

## 2020-08-14 PROCEDURE — 87086 URINE CULTURE/COLONY COUNT: CPT

## 2020-08-14 PROCEDURE — 83935 ASSAY OF URINE OSMOLALITY: CPT

## 2020-08-14 PROCEDURE — 2060000000 HC ICU INTERMEDIATE R&B

## 2020-08-14 PROCEDURE — 84550 ASSAY OF BLOOD/URIC ACID: CPT

## 2020-08-14 PROCEDURE — 82746 ASSAY OF FOLIC ACID SERUM: CPT

## 2020-08-14 PROCEDURE — 83036 HEMOGLOBIN GLYCOSYLATED A1C: CPT

## 2020-08-14 PROCEDURE — 83735 ASSAY OF MAGNESIUM: CPT

## 2020-08-14 PROCEDURE — 85730 THROMBOPLASTIN TIME PARTIAL: CPT

## 2020-08-14 PROCEDURE — U0002 COVID-19 LAB TEST NON-CDC: HCPCS

## 2020-08-14 PROCEDURE — 83605 ASSAY OF LACTIC ACID: CPT

## 2020-08-14 PROCEDURE — 84484 ASSAY OF TROPONIN QUANT: CPT

## 2020-08-14 PROCEDURE — 83690 ASSAY OF LIPASE: CPT

## 2020-08-14 PROCEDURE — 83540 ASSAY OF IRON: CPT

## 2020-08-14 PROCEDURE — 81001 URINALYSIS AUTO W/SCOPE: CPT

## 2020-08-14 PROCEDURE — 82306 VITAMIN D 25 HYDROXY: CPT

## 2020-08-14 PROCEDURE — 82330 ASSAY OF CALCIUM: CPT

## 2020-08-14 RX ORDER — ACETAMINOPHEN 650 MG/1
650 SUPPOSITORY RECTAL EVERY 6 HOURS PRN
Status: DISCONTINUED | OUTPATIENT
Start: 2020-08-14 | End: 2020-08-17 | Stop reason: HOSPADM

## 2020-08-14 RX ORDER — PROMETHAZINE HYDROCHLORIDE 25 MG/1
12.5 TABLET ORAL EVERY 6 HOURS PRN
Status: DISCONTINUED | OUTPATIENT
Start: 2020-08-14 | End: 2020-08-17 | Stop reason: HOSPADM

## 2020-08-14 RX ORDER — POLYETHYLENE GLYCOL 3350 17 G/17G
17 POWDER, FOR SOLUTION ORAL DAILY PRN
Status: DISCONTINUED | OUTPATIENT
Start: 2020-08-14 | End: 2020-08-17 | Stop reason: HOSPADM

## 2020-08-14 RX ORDER — SODIUM CHLORIDE 0.9 % (FLUSH) 0.9 %
10 SYRINGE (ML) INJECTION EVERY 12 HOURS SCHEDULED
Status: DISCONTINUED | OUTPATIENT
Start: 2020-08-14 | End: 2020-08-17 | Stop reason: HOSPADM

## 2020-08-14 RX ORDER — HEPARIN SODIUM 5000 [USP'U]/ML
5000 INJECTION, SOLUTION INTRAVENOUS; SUBCUTANEOUS EVERY 8 HOURS SCHEDULED
Status: DISCONTINUED | OUTPATIENT
Start: 2020-08-14 | End: 2020-08-17 | Stop reason: HOSPADM

## 2020-08-14 RX ORDER — DEXTROSE MONOHYDRATE 50 MG/ML
100 INJECTION, SOLUTION INTRAVENOUS PRN
Status: DISCONTINUED | OUTPATIENT
Start: 2020-08-14 | End: 2020-08-17 | Stop reason: HOSPADM

## 2020-08-14 RX ORDER — DEXTROSE MONOHYDRATE 25 G/50ML
12.5 INJECTION, SOLUTION INTRAVENOUS PRN
Status: DISCONTINUED | OUTPATIENT
Start: 2020-08-14 | End: 2020-08-17 | Stop reason: HOSPADM

## 2020-08-14 RX ORDER — NEOMYCIN SULFATE, POLYMYXIN B SULFATE AND HYDROCORTISONE 10; 3.5; 1 MG/ML; MG/ML; [USP'U]/ML
3 SUSPENSION/ DROPS AURICULAR (OTIC) 4 TIMES DAILY
COMMUNITY
End: 2021-03-25

## 2020-08-14 RX ORDER — ACETAMINOPHEN 325 MG/1
650 TABLET ORAL EVERY 6 HOURS PRN
Status: DISCONTINUED | OUTPATIENT
Start: 2020-08-14 | End: 2020-08-17 | Stop reason: HOSPADM

## 2020-08-14 RX ORDER — ONDANSETRON 2 MG/ML
4 INJECTION INTRAMUSCULAR; INTRAVENOUS EVERY 6 HOURS PRN
Status: DISCONTINUED | OUTPATIENT
Start: 2020-08-14 | End: 2020-08-17 | Stop reason: HOSPADM

## 2020-08-14 RX ORDER — OMEPRAZOLE 20 MG/1
20 CAPSULE, DELAYED RELEASE ORAL DAILY
COMMUNITY
End: 2021-03-22 | Stop reason: SDUPTHER

## 2020-08-14 RX ORDER — NICOTINE POLACRILEX 4 MG
15 LOZENGE BUCCAL PRN
Status: DISCONTINUED | OUTPATIENT
Start: 2020-08-14 | End: 2020-08-17 | Stop reason: HOSPADM

## 2020-08-14 RX ORDER — SODIUM CHLORIDE 0.9 % (FLUSH) 0.9 %
10 SYRINGE (ML) INJECTION PRN
Status: DISCONTINUED | OUTPATIENT
Start: 2020-08-14 | End: 2020-08-17 | Stop reason: HOSPADM

## 2020-08-14 RX ADMIN — SODIUM BICARBONATE: 84 INJECTION, SOLUTION INTRAVENOUS at 23:18

## 2020-08-14 RX ADMIN — SODIUM BICARBONATE: 84 INJECTION, SOLUTION INTRAVENOUS at 19:15

## 2020-08-14 RX ADMIN — CEFEPIME 1 G: 1 INJECTION, POWDER, FOR SOLUTION INTRAMUSCULAR; INTRAVENOUS at 19:15

## 2020-08-14 RX ADMIN — DEXTROSE MONOHYDRATE 12.5 G: 25 INJECTION, SOLUTION INTRAVENOUS at 23:22

## 2020-08-14 ASSESSMENT — ENCOUNTER SYMPTOMS
SHORTNESS OF BREATH: 0
NAUSEA: 0
SORE THROAT: 0
VOMITING: 0
COUGH: 0
CONSTIPATION: 0
ABDOMINAL PAIN: 0
DIARRHEA: 0

## 2020-08-14 NOTE — ED NOTES
Mode of arrival:  Family drove pt in      Residence prior to admit: home      Chief complaint on admission: abnormal labs  Pt has lab work this morning. Pt was advised by Dr Rashi Linn to come into the ER. Dr Rashi Linn did call in prior to inform ER that pt would be coming in. Pt is AOx4, ambulates with cane or walker, and does not appear to be in any distress at this time. C= \"Have you ever felt that you should Cut down on your drinking? \"  No  A= \"Have people Annoyed you by criticizing your drinking? \"  No  G= \"Have you ever felt bad or Guilty about your drinking? \"  No  E= \"Have you ever had a drink as an Eye-opener first thing in the morning to steady your nerves or to help a hangover? \"  No      Deferred []      Reason for deferring: N/A    *If yes to two or more: probable alcohol abuse. [address] [place] [facility], RN  08/14/20 4532

## 2020-08-14 NOTE — PROGRESS NOTES
Medication History completed:    New medications: None    Medications discontinued: Potassium chloride    Changes to dosing: None    Stated allergies: As listed    Other pertinent information: Medications confirmed with the patient. She states she started the Cortisporin drops on 8/13/2020. Thank you,  Mohamud Lucas   PharmD.  Candidate 2021

## 2020-08-14 NOTE — ED PROVIDER NOTES
[address]  Emergency Department Encounter  EmergencyMedicine Resident     Pt Name:Anita Fulton  MRN: [id number]  Birthdate 1935  Date of evaluation: 8/14/20  PCP:  Yennifer Barrios MD    [address] [place] [address]       Chief Complaint   Patient presents with    Abnormal Lab       HISTORY OF PRESENT ILLNESS  (Location/Symptom, Timing/Onset, Context/Setting, Quality, Duration, Modifying Factors, Severity.)      Stacia Dominique is a 81 y.o. female who presents to the emergency department with referral from PCP for abnormal labs. Patient endorses a one-week history of generalized malaise and generalized weakness without focal weakness or sensory loss. She also endorses a 3 to 4-day history of vertigo and right-sided hearing loss which happened suddenly. She has tried eardrops for the past 2 days without improvement in her symptoms. She was referred from her primary care doctor due to abnormal labs consisting of GFR 11 down from 33, hyponatremia at 123, and elevated anion gap. Patient otherwise denies recent history of sick symptoms to include fever, chills, nausea, vomiting, chest pain, shortness of breath, diarrhea, pain with urination, or new numbness or tingling anywhere. She endorses a chronic history of difficulty walking and uses a walker at home. The only sick contact she has had is her son who has had a 3-week history of mild cold. PAST MEDICAL / SURGICAL / SOCIAL / FAMILY HISTORY      has a past medical history of CKD (chronic kidney disease) stage 3, GFR 30-59 ml/min ([facility]), Depression, GERD (gastroesophageal reflux disease), Gout, Hyperlipidemia, Hypertension, Hyponatremia, Metabolic acidosis, and Osteoporosis. has a past surgical history that includes bladder suspension; Total knee arthroplasty (Bilateral); Hysterectomy; Total hip arthroplasty (Bilateral); Total shoulder arthroplasty (Bilateral); Appendectomy; and eye surgery.     Social History     Socioeconomic History  Marital status:      Spouse name: Not on file    Number of children: Not on file    Years of education: Not on file    Highest education level: Not on file   Occupational History    Not on file   Social Needs    Financial resource strain: Not on file    Food insecurity     Worry: Not on file     Inability: Not on file    Transportation needs     Medical: Not on file     Non-medical: Not on file   Tobacco Use    Smoking status: Never Smoker    Smokeless tobacco: Never Used   Substance and Sexual Activity    Alcohol use: Yes     Alcohol/week: 2.0 standard drinks     Types: 2 Standard drinks or equivalent per week     Comment: occasionally    Drug use: No    Sexual activity: Not on file   Lifestyle    Physical activity     Days per week: Not on file     Minutes per session: Not on file    Stress: Not on file   Relationships    Social connections     Talks on phone: Not on file     Gets together: Not on file     Attends Islam service: Not on file     Active member of club or organization: Not on file     Attends meetings of clubs or organizations: Not on file     Relationship status: Not on file    Intimate partner violence     Fear of current or ex partner: Not on file     Emotionally abused: Not on file     Physically abused: Not on file     Forced sexual activity: Not on file   Other Topics Concern    Not on file   Social History Narrative    Not on file       Family History   Problem Relation Age of Onset    Cancer Mother         breast cancer    Emphysema Mother     Cancer Father         Prostate cancer    Heart Disease Maternal Grandfather     Heart Disease Paternal Grandfather        Allergies:  Norvasc [amlodipine besylate]    Home Medications:  Prior to Admission medications    Medication Sig Start Date End Date Taking?  Authorizing Provider   omeprazole (PRILOSEC) 20 MG delayed release capsule Take 20 mg by mouth daily   Yes Historical Provider, MD neomycin-polymyxin-hydrocortisone (CORTISPORIN) 3.5-50491-9 otic suspension Place 3 drops into the right ear 4 times daily For 10 days; Started 8/13/2020   Yes Historical Provider, MD   allopurinol (ZYLOPRIM) 100 MG tablet Take 100 mg by mouth daily    Yes Historical Provider, MD   losartan (COZAAR) 100 MG tablet Take 1 tablet by mouth daily 10/10/19  Yes Martin Brown MD   hydrALAZINE (APRESOLINE) 25 MG tablet TAKE 1 TABLET THREE TIMES A DAY 10/10/19  Yes Martin Brown MD   furosemide (LASIX) 20 MG tablet Take 1 tablet by mouth daily 10/10/19  Yes Martin Brown MD   cloNIDine (CATAPRES) 0.2 MG tablet Take 1 tablet by mouth daily 10/10/19  Yes Martin Brown MD   atorvastatin (LIPITOR) 20 MG tablet Take 1 tablet by mouth daily 10/10/19  Yes Martin Brown MD   aspirin 81 MG tablet Take 1 tablet by mouth daily 6/15/18  Yes Martin Brown MD   Multiple Vitamins-Minerals (CENTRUM SILVER PO) Take 1 tablet by mouth daily   Yes Historical Provider, MD   Multiple Vitamins-Minerals (OCUVITE ADULT 50+ PO) Take 1 tablet by mouth daily   Yes Historical Provider, MD       REVIEW OF SYSTEMS    (2-9 systems for level 4, 10 or more for level 5)      Review of Systems   Constitutional: Negative for chills and fever. HENT: Positive for hearing loss. Negative for ear pain and sore throat. Eyes: Negative for visual disturbance. Respiratory: Negative for cough and shortness of breath. Cardiovascular: Negative for chest pain. Gastrointestinal: Negative for abdominal pain, constipation, diarrhea, nausea and vomiting. Genitourinary: Negative for difficulty urinating and dysuria. Musculoskeletal: Negative for arthralgias and myalgias. Neurological: Positive for dizziness and weakness (Generalized). Negative for syncope, facial asymmetry, light-headedness, numbness and headaches. Psychiatric/Behavioral: Negative for agitation and confusion.        PHYSICAL EXAM   (up to 7 for level 4, 8 or more for level 5)      INITIAL VITALS:   /64   Pulse 97   Temp 97.6 °F (36.4 °C) (Oral)   Resp 20   Ht 5' (1.524 m)   Wt 144 lb (65.3 kg)   SpO2 97%   BMI 28.12 kg/m²     Physical Exam  Vitals signs and nursing note reviewed. Constitutional:       General: She is not in acute distress. Appearance: Normal appearance. She is well-developed. She is not ill-appearing or diaphoretic. HENT:      Head: Normocephalic and atraumatic. Right Ear: External ear normal.      Left Ear: External ear normal. There is impacted cerumen. Ears:      Comments: The right ear canal demonstrates flesh colored irregular obstruction of the ear canal     Nose: Nose normal.      Mouth/Throat:      Mouth: Mucous membranes are moist.   Eyes:      Extraocular Movements: Extraocular movements intact. Conjunctiva/sclera: Conjunctivae normal.   Neck:      Musculoskeletal: Normal range of motion and neck supple. Trachea: No tracheal deviation. Cardiovascular:      Rate and Rhythm: Normal rate and regular rhythm. Heart sounds: Murmur (Blowing systolic) present. No friction rub. No gallop. Pulmonary:      Effort: Pulmonary effort is normal. No respiratory distress. Breath sounds: Normal breath sounds. No wheezing, rhonchi or rales. Abdominal:      General: Abdomen is flat. There is no distension. Palpations: Abdomen is soft. There is no mass. Tenderness: There is no abdominal tenderness. There is no guarding or rebound. Musculoskeletal: Normal range of motion. General: No swelling, deformity or signs of injury. Right lower leg: Edema present. Left lower leg: Edema present. Comments: Mild nonpitting bilateral ankle swelling, nontender and not warm   Skin:     General: Skin is warm and dry. Capillary Refill: Capillary refill takes less than 2 seconds. Coloration: Skin is not jaundiced. Findings: No bruising or lesion.    Neurological:      General: No focal deficit present. Mental Status: She is alert and oriented to person, place, and time. Mental status is at baseline. Cranial Nerves: No cranial nerve deficit. Sensory: No sensory deficit. Motor: No weakness or abnormal muscle tone. Coordination: Coordination normal.      Comments: Normal finger-nose-finger and heel shin bilaterally         DIFFERENTIAL  DIAGNOSIS     PLAN (LABS / IMAGING / EKG):  Orders Placed This Encounter   Procedures    Culture, Urine    CT Head WO Contrast    XR CHEST PORTABLE    TOX SCR, BLD, ED    Blood Gas, Venous    Beta Hydroxybutyrate    CBC Auto Differential    Comprehensive Metabolic Panel    Magnesium    Calcium, Ionized    PHOSPHORUS    Protime-INR    APTT    Lactic Acid, Plasma    Urinalysis, reflex to microscopic    OSMOLALITY, URINE    Osmolality    Lipase    Troponin    Drug screen, tricyclic    Troponin    Microscopic Urinalysis    COVID-19    Inpatient consult to Nephrology    Inpatient consult to Internal Medicine    POCT Glucose    POC Glucose Fingerstick    EKG 12 Lead    Insert peripheral IV    PATIENT STATUS (FROM ED OR OR/PROCEDURAL) Inpatient    Seizure precautions    Fall precautions       MEDICATIONS ORDERED:  Orders Placed This Encounter   Medications    sodium bicarbonate 75 mEq in sodium chloride 0.45 % 1,000 mL infusion    cefepime (MAXIPIME) 1 g IVPB minibag       DDX: Stewart Israel is a 81 y.o. female who presents to the emergency department with a one-week history of malaise with abnormal labs.  Differential diagnosis includes toxic alcohol ingestion, salicylate overdose, toxicity from loop diuretic, schwannoma, intracranial injury or mass, electrolyte derangement, ketosis possibly due to alcohol or metabolic derangement    DIAGNOSTIC RESULTS / EMERGENCY DEPARTMENT COURSE / MDM   LAB RESULTS:  Results for orders placed or performed during the hospital encounter of 08/14/20   TOX SCR, BLD, ED   Result Value Ref Range    Acetaminophen Level <5 (L) 10 - 30 ug/mL    Ethanol <10 <10 mg/dL    Ethanol percent <4.683 %    Salicylate Lvl <1 (L) 3 - 10 mg/dL    Toxic Tricyclic Sc,Blood WRONG TEST ORDERED NEGATIVE   Blood Gas, Venous   Result Value Ref Range    pH, Cecil 7.317 (L) 7.320 - 7.420    pCO2, Cecil 27.5 (L) 39.0 - 55.0    pO2, Cecil 31.6 30.0 - 50.0    HCO3, Venous 14.0 (L) 24.0 - 30.0 mmol/L    Positive Base Excess, Cecil NOT REPORTED 0.0 - 2.0 mmol/L    Negative Base Excess, Cecil 12.1 (H) 0.0 - 2.0 mmol/L    O2 Sat, Cecil 59.5 (L) 60.0 - 85.0 %    Total Hb NOT REPORTED 12.0 - 16.0 g/dl    Oxyhemoglobin NOT REPORTED 95.0 - 98.0 %    Carboxyhemoglobin 2.2 0 - 5 %    Methemoglobin 0.3 0.0 - 1.9 %    Pt Temp 37     pH, Cecil, Temp Adj NOT REPORTED 7.320 - 7.420    pCO2, Cecil, Temp Adj NOT REPORTED 39.0 - 55.0 mmHg    pO2, Cecil, Temp Adj NOT REPORTED 30.0 - 50.0 mmHg    O2 Device/Flow/% NOT REPORTED     Respiratory Rate NOT REPORTED     Jorge Test NOT REPORTED     Sample Site NOT REPORTED     Pt.  Position NOT REPORTED     Mode NOT REPORTED     Set Rate NOT REPORTED     Total Rate NOT REPORTED     VT NOT REPORTED     FIO2 NOT REPORTED     Peep/Cpap NOT REPORTED     PSV NOT REPORTED     Text for Respiratory NOT REPORTED     NOTIFICATION NOT REPORTED     NOTIFICATION TIME NOT REPORTED    Beta Hydroxybutyrate   Result Value Ref Range    Beta-Hydroxybutyrate 6.02 (H) 0.02 - 0.27 mmol/L   CBC Auto Differential   Result Value Ref Range    WBC 7.3 3.5 - 11.0 k/uL    RBC 3.41 (L) 4.0 - 5.2 m/uL    Hemoglobin 11.2 (L) 12.0 - 16.0 g/dL    Hematocrit 33.4 (L) 36 - 46 %    MCV 98.0 80 - 100 fL    MCH 32.9 26 - 34 pg    MCHC 33.5 31 - 37 g/dL    RDW 16.9 (H) 11.5 - 14.9 %    Platelets 607 834 - 840 k/uL    MPV 10.0 6.0 - 12.0 fL    NRBC Automated NOT REPORTED per 100 WBC    Differential Type NOT REPORTED     Seg Neutrophils 80 (H) 36 - 66 %    Lymphocytes 13 (L) 24 - 44 %    Monocytes 6 1 - 7 %    Eosinophils % 0 0 - 4 %    Basophils 1 0 - 2 % Immature Granulocytes NOT REPORTED 0 %    Segs Absolute 5.80 1.3 - 9.1 k/uL    Absolute Lymph # 0.90 (L) 1.0 - 4.8 k/uL    Absolute Mono # 0.50 0.1 - 1.3 k/uL    Absolute Eos # 0.00 0.0 - 0.4 k/uL    Basophils Absolute 0.10 0.0 - 0.2 k/uL    Absolute Immature Granulocyte NOT REPORTED 0.00 - 0.30 k/uL    WBC Morphology NOT REPORTED     RBC Morphology NOT REPORTED     Platelet Estimate NOT REPORTED    Comprehensive Metabolic Panel   Result Value Ref Range    Glucose 61 (L) 70 - 99 mg/dL    BUN 62 (H) 8 - 23 mg/dL    CREATININE 3.92 (H) 0.50 - 0.90 mg/dL    Bun/Cre Ratio NOT REPORTED 9 - 20    Calcium 8.6 8.6 - 10.4 mg/dL    Sodium 121 (L) 135 - 144 mmol/L    Potassium 4.7 3.7 - 5.3 mmol/L    Chloride 82 (L) 98 - 107 mmol/L    CO2 13 (L) 20 - 31 mmol/L    Anion Gap 26 (H) 9 - 17 mmol/L    Alkaline Phosphatase 104 35 - 104 U/L    ALT 12 5 - 33 U/L    AST 44 (H) <32 U/L    Total Bilirubin 0.83 0.3 - 1.2 mg/dL    Total Protein 6.8 6.4 - 8.3 g/dL    Alb 4.3 3.5 - 5.2 g/dL    Albumin/Globulin Ratio NOT REPORTED 1.0 - 2.5    GFR Non-African American 11 (L) >60 mL/min    GFR  13 (L) >60 mL/min    GFR Comment          GFR Staging NOT REPORTED    Magnesium   Result Value Ref Range    Magnesium 1.8 1.6 - 2.6 mg/dL   Calcium, Ionized   Result Value Ref Range    Calcium, Ion 1.08 (L) 1.13 - 1.33 mmol/L   PHOSPHORUS   Result Value Ref Range    Phosphorus 3.6 2.6 - 4.5 mg/dL   Protime-INR   Result Value Ref Range    Protime 13.0 11.8 - 14.6 sec    INR 1.0    APTT   Result Value Ref Range    PTT 28.9 24.0 - 36.0 sec   Lactic Acid, Plasma   Result Value Ref Range    Lactic Acid 1.6 0.5 - 2.2 mmol/L    Lactic Acid, Whole Blood NOT REPORTED 0.7 - 2.1 mmol/L   Urinalysis, reflex to microscopic   Result Value Ref Range    Color, UA YELLOW YELLOW    Turbidity UA CLOUDY (A) CLEAR    Glucose, Ur NEGATIVE NEGATIVE    Bilirubin Urine (A) NEGATIVE     Presumptive positive. Unable to confirm due to unavailability of reagent. Ketones, Urine TRACE (A) NEGATIVE    Specific Hegins, UA 1.011 1.000 - 1.030    Urine Hgb NEGATIVE NEGATIVE    pH, UA 5.5 5.0 - 8.0    Protein, UA NEGATIVE NEGATIVE    Urobilinogen, Urine Normal Normal    Nitrite, Urine NEGATIVE NEGATIVE    Leukocyte Esterase, Urine LARGE (A) NEGATIVE    Urinalysis Comments NOT REPORTED    Lipase   Result Value Ref Range    Lipase 43 13 - 60 U/L   Troponin   Result Value Ref Range    Troponin, High Sensitivity 51 (H) 0 - 14 ng/L    Troponin T NOT REPORTED <0.03 ng/mL    Troponin Interp NOT REPORTED    Drug screen, tricyclic   Result Value Ref Range    Tricyclic Antidep,Urine NEGATIVE NEGATIVE   Troponin   Result Value Ref Range    Troponin, High Sensitivity 47 (H) 0 - 14 ng/L    Troponin T NOT REPORTED <0.03 ng/mL    Troponin Interp NOT REPORTED    Microscopic Urinalysis   Result Value Ref Range    -          WBC, UA 20 TO 50 /HPF    RBC, UA 0 TO 2 /HPF    Casts UA NOT REPORTED /LPF    Crystals, UA NOT REPORTED None /HPF    Epithelial Cells UA 2 TO 5 /HPF    Renal Epithelial, UA NOT REPORTED 0 /HPF    Bacteria, UA MODERATE (A) None    Mucus, UA NOT REPORTED None    Trichomonas, UA NOT REPORTED None    Amorphous, UA 1+ (A) None    Other Observations UA NOT REPORTED NOT REQ. Yeast, UA NOT REPORTED None   COVID-19    Specimen: Other   Result Value Ref Range    SARS-CoV-2          SARS-CoV-2, Rapid Not Detected Not Detected    Source . NASOPHARYNGEAL SWAB     SARS-CoV-2, PCR         POC Glucose Fingerstick   Result Value Ref Range    POC Glucose 63 (L) 65 - 105 mg/dL   EKG 12 Lead   Result Value Ref Range    Ventricular Rate 88 BPM    Atrial Rate 88 BPM    P-R Interval 212 ms    QRS Duration 88 ms    Q-T Interval 358 ms    QTc Calculation (Bazett) 433 ms    P Axis 27 degrees    R Axis -43 degrees    T Axis 60 degrees       IMPRESSION: Jewels Ortiz is a 81 y.o. female who presents to the emergency department with a one-week history of malaise with abnormal labs.   On admission with one half normal saline and 75 of bicarb running at 100 mils per hour and cefepime 1 g every 24 for acute urinary tract infection. Staff medicine consulted. [TS]      ED Course User Index  [TS] Anne-Marie Morales MD       PROCEDURES:  None    CONSULTS:  IP CONSULT TO NEPHROLOGY  IP CONSULT TO INTERNAL MEDICINE    CRITICAL CARE:  Please see attending note. FINAL IMPRESSION      1. Acute renal failure superimposed on chronic kidney disease, unspecified CKD stage, unspecified acute renal failure type ([address]    2. Acute cystitis without hematuria          DISPOSITION / PLAN     DISPOSITION        PATIENT REFERRED TO:  Donavon Nobles MD  [address]  [address]  [address] [address]  [phone]            DISCHARGE MEDICATIONS:  New Prescriptions    No medications on file       Anne-Marie Morales MD  Emergency Medicine Resident    This patient was evaluated in the Emergency Department for symptoms described in the history of present illness. He/she was evaluated in the context of the global COVID-19 pandemic, which necessitated consideration that the patient might be at risk for infection with the SARS-CoV-2 virus that causes COVID-19. Institutional protocols and algorithms that pertain to the evaluation of patients at risk for COVID-19 are in a state of rapid change based on information released by regulatory bodies including the CDC and federal and state organizations. These policies and algorithms were followed during the patient's care in the ED.     (Please note that portions of thisnote were completed with a voice recognition program.  Efforts were made to edit the dictations but occasionally words are mis-transcribed.)        Anne-Marie Morales MD  Resident  08/14/20 [id number]

## 2020-08-14 NOTE — ED NOTES
Report given to SIDNEY THRASHER from ed. Report method in person   The following was reviewed with receiving RN:   Current vital signs:  /64   Pulse 97   Temp 97.6 °F (36.4 °C) (Oral)   Resp 20   Ht 5' (1.524 m)   Wt 144 lb (65.3 kg)   SpO2 97%   BMI 28.12 kg/m²                MEWS Score: 1     Any medication or safety alerts were reviewed. Any pending diagnostics and notifications were also reviewed, as well as any safety concerns or issues, abnormal labs, abnormal imaging, and abnormal assessment findings. Questions were answered.           Mk Snider RN  08/14/20 [id number]

## 2020-08-15 ENCOUNTER — APPOINTMENT (OUTPATIENT)
Dept: ULTRASOUND IMAGING | Age: 85
DRG: 683 | End: 2020-08-15
Payer: MEDICARE

## 2020-08-15 PROBLEM — D58.2 SIGNIFICANT DROP IN HEMOGLOBIN (HCC): Status: ACTIVE | Noted: 2020-08-15

## 2020-08-15 LAB
-: ABNORMAL
ABSOLUTE EOS #: 0 K/UL (ref 0–0.4)
ABSOLUTE IMMATURE GRANULOCYTE: ABNORMAL K/UL (ref 0–0.3)
ABSOLUTE LYMPH #: 1.6 K/UL (ref 1–4.8)
ABSOLUTE MONO #: 0.6 K/UL (ref 0.1–1.3)
AMORPHOUS: ABNORMAL
ANION GAP SERPL CALCULATED.3IONS-SCNC: 17 MMOL/L (ref 9–17)
BACTERIA: ABNORMAL
BASOPHILS # BLD: 1 % (ref 0–2)
BASOPHILS ABSOLUTE: 0 K/UL (ref 0–0.2)
BILIRUBIN URINE: NEGATIVE
BUN BLDV-MCNC: 58 MG/DL (ref 8–23)
BUN/CREAT BLD: ABNORMAL (ref 9–20)
CALCIUM SERPL-MCNC: 7.3 MG/DL (ref 8.6–10.4)
CASTS UA: ABNORMAL /LPF
CHLORIDE BLD-SCNC: 93 MMOL/L (ref 98–107)
CHLORIDE, UR: <20 MMOL/L
CO2: 18 MMOL/L (ref 20–31)
COLOR: YELLOW
COMMENT UA: ABNORMAL
COMPLEMENT C3: 60 MG/DL (ref 90–180)
COMPLEMENT C4: 17 MG/DL (ref 10–40)
CREAT SERPL-MCNC: 3.49 MG/DL (ref 0.5–0.9)
CRYSTALS, UA: ABNORMAL /HPF
CULTURE: NORMAL
DIFFERENTIAL TYPE: ABNORMAL
EKG ATRIAL RATE: 88 BPM
EKG P AXIS: 27 DEGREES
EKG P-R INTERVAL: 212 MS
EKG Q-T INTERVAL: 358 MS
EKG QRS DURATION: 88 MS
EKG QTC CALCULATION (BAZETT): 433 MS
EKG R AXIS: -43 DEGREES
EKG T AXIS: 60 DEGREES
EKG VENTRICULAR RATE: 88 BPM
EOSINOPHILS RELATIVE PERCENT: 1 % (ref 0–4)
EPITHELIAL CELLS UA: ABNORMAL /HPF
FREE KAPPA/LAMBDA RATIO: 0.9 (ref 0.26–1.65)
GFR AFRICAN AMERICAN: 15 ML/MIN
GFR NON-AFRICAN AMERICAN: 13 ML/MIN
GFR SERPL CREATININE-BSD FRML MDRD: ABNORMAL ML/MIN/{1.73_M2}
GFR SERPL CREATININE-BSD FRML MDRD: ABNORMAL ML/MIN/{1.73_M2}
GLUCOSE BLD-MCNC: 114 MG/DL (ref 65–105)
GLUCOSE BLD-MCNC: 131 MG/DL (ref 65–105)
GLUCOSE BLD-MCNC: 151 MG/DL (ref 65–105)
GLUCOSE BLD-MCNC: 82 MG/DL (ref 70–99)
GLUCOSE BLD-MCNC: 89 MG/DL (ref 65–105)
GLUCOSE URINE: NEGATIVE
HCT VFR BLD CALC: 24.6 % (ref 36–46)
HCT VFR BLD CALC: 26 % (ref 36–46)
HCT VFR BLD CALC: 28.7 % (ref 36–46)
HEMOGLOBIN: 8.5 G/DL (ref 12–16)
HEMOGLOBIN: 8.8 G/DL (ref 12–16)
HEMOGLOBIN: 9.6 G/DL (ref 12–16)
IMMATURE GRANULOCYTES: ABNORMAL %
KAPPA FREE LIGHT CHAINS QNT: 3.29 MG/DL (ref 0.37–1.94)
KETONES, URINE: NEGATIVE
LAMBDA FREE LIGHT CHAINS QNT: 3.65 MG/DL (ref 0.57–2.63)
LEUKOCYTE ESTERASE, URINE: ABNORMAL
LYMPHOCYTES # BLD: 27 % (ref 24–44)
Lab: NORMAL
MCH RBC QN AUTO: 33.3 PG (ref 26–34)
MCHC RBC AUTO-ENTMCNC: 34.4 G/DL (ref 31–37)
MCV RBC AUTO: 96.7 FL (ref 80–100)
MONOCYTES # BLD: 11 % (ref 1–7)
MUCUS: ABNORMAL
NITRITE, URINE: NEGATIVE
NRBC AUTOMATED: ABNORMAL PER 100 WBC
OTHER OBSERVATIONS UA: ABNORMAL
PDW BLD-RTO: 16.7 % (ref 11.5–14.9)
PH UA: 7 (ref 5–8)
PLATELET # BLD: 225 K/UL (ref 150–450)
PLATELET ESTIMATE: ABNORMAL
PMV BLD AUTO: 9 FL (ref 6–12)
POTASSIUM SERPL-SCNC: 3.8 MMOL/L (ref 3.7–5.3)
PROTEIN UA: NEGATIVE
RBC # BLD: 2.54 M/UL (ref 4–5.2)
RBC # BLD: ABNORMAL 10*6/UL
RBC UA: ABNORMAL /HPF
RENAL EPITHELIAL, UA: ABNORMAL /HPF
SEG NEUTROPHILS: 60 % (ref 36–66)
SEGMENTED NEUTROPHILS ABSOLUTE COUNT: 3.7 K/UL (ref 1.3–9.1)
SERUM OSMOLALITY: 292 MOSM/KG (ref 275–295)
SODIUM BLD-SCNC: 124 MMOL/L (ref 135–144)
SODIUM BLD-SCNC: 128 MMOL/L (ref 135–144)
SODIUM BLD-SCNC: 128 MMOL/L (ref 135–144)
SODIUM,UR: 27 MMOL/L
SPECIFIC GRAVITY UA: 1.01 (ref 1–1.03)
SPECIMEN DESCRIPTION: NORMAL
TOTAL CK: 562 U/L (ref 26–192)
TRICHOMONAS: ABNORMAL
TURBIDITY: CLEAR
URIC ACID: 6 MG/DL (ref 2.4–5.7)
URINE HGB: NEGATIVE
UROBILINOGEN, URINE: NORMAL
WBC # BLD: 6 K/UL (ref 3.5–11)
WBC # BLD: ABNORMAL 10*3/UL
WBC UA: ABNORMAL /HPF
YEAST: ABNORMAL

## 2020-08-15 PROCEDURE — 86038 ANTINUCLEAR ANTIBODIES: CPT

## 2020-08-15 PROCEDURE — 86160 COMPLEMENT ANTIGEN: CPT

## 2020-08-15 PROCEDURE — 2500000003 HC RX 250 WO HCPCS: Performed by: EMERGENCY MEDICINE

## 2020-08-15 PROCEDURE — 83930 ASSAY OF BLOOD OSMOLALITY: CPT

## 2020-08-15 PROCEDURE — 82947 ASSAY GLUCOSE BLOOD QUANT: CPT

## 2020-08-15 PROCEDURE — 6370000000 HC RX 637 (ALT 250 FOR IP): Performed by: INTERNAL MEDICINE

## 2020-08-15 PROCEDURE — 81001 URINALYSIS AUTO W/SCOPE: CPT

## 2020-08-15 PROCEDURE — 97161 PT EVAL LOW COMPLEX 20 MIN: CPT

## 2020-08-15 PROCEDURE — 82550 ASSAY OF CK (CPK): CPT

## 2020-08-15 PROCEDURE — 99223 1ST HOSP IP/OBS HIGH 75: CPT | Performed by: INTERNAL MEDICINE

## 2020-08-15 PROCEDURE — 6360000002 HC RX W HCPCS: Performed by: EMERGENCY MEDICINE

## 2020-08-15 PROCEDURE — 83883 ASSAY NEPHELOMETRY NOT SPEC: CPT

## 2020-08-15 PROCEDURE — 84295 ASSAY OF SERUM SODIUM: CPT

## 2020-08-15 PROCEDURE — 82436 ASSAY OF URINE CHLORIDE: CPT

## 2020-08-15 PROCEDURE — 85014 HEMATOCRIT: CPT

## 2020-08-15 PROCEDURE — 76770 US EXAM ABDO BACK WALL COMP: CPT

## 2020-08-15 PROCEDURE — 2060000000 HC ICU INTERMEDIATE R&B

## 2020-08-15 PROCEDURE — 36415 COLL VENOUS BLD VENIPUNCTURE: CPT

## 2020-08-15 PROCEDURE — 85025 COMPLETE CBC W/AUTO DIFF WBC: CPT

## 2020-08-15 PROCEDURE — 84300 ASSAY OF URINE SODIUM: CPT

## 2020-08-15 PROCEDURE — 84550 ASSAY OF BLOOD/URIC ACID: CPT

## 2020-08-15 PROCEDURE — 2580000003 HC RX 258: Performed by: EMERGENCY MEDICINE

## 2020-08-15 PROCEDURE — 85018 HEMOGLOBIN: CPT

## 2020-08-15 PROCEDURE — 80048 BASIC METABOLIC PNL TOTAL CA: CPT

## 2020-08-15 RX ORDER — ALLOPURINOL 100 MG/1
100 TABLET ORAL DAILY
Status: DISCONTINUED | OUTPATIENT
Start: 2020-08-15 | End: 2020-08-17 | Stop reason: HOSPADM

## 2020-08-15 RX ORDER — CLONIDINE HYDROCHLORIDE 0.2 MG/1
0.2 TABLET ORAL DAILY
Status: DISCONTINUED | OUTPATIENT
Start: 2020-08-15 | End: 2020-08-17 | Stop reason: HOSPADM

## 2020-08-15 RX ORDER — LOSARTAN POTASSIUM 50 MG/1
100 TABLET ORAL DAILY
Status: DISCONTINUED | OUTPATIENT
Start: 2020-08-15 | End: 2020-08-16

## 2020-08-15 RX ORDER — ATORVASTATIN CALCIUM 20 MG/1
20 TABLET, FILM COATED ORAL DAILY
Status: DISCONTINUED | OUTPATIENT
Start: 2020-08-15 | End: 2020-08-17 | Stop reason: HOSPADM

## 2020-08-15 RX ORDER — ASPIRIN 81 MG/1
81 TABLET ORAL DAILY
Status: DISCONTINUED | OUTPATIENT
Start: 2020-08-15 | End: 2020-08-17 | Stop reason: HOSPADM

## 2020-08-15 RX ADMIN — CLONIDINE HYDROCHLORIDE 0.2 MG: 0.2 TABLET ORAL at 21:32

## 2020-08-15 RX ADMIN — SODIUM BICARBONATE: 84 INJECTION, SOLUTION INTRAVENOUS at 09:58

## 2020-08-15 RX ADMIN — ALLOPURINOL 100 MG: 100 TABLET ORAL at 14:16

## 2020-08-15 RX ADMIN — ASPIRIN 81 MG: 81 TABLET, COATED ORAL at 14:16

## 2020-08-15 RX ADMIN — LOSARTAN POTASSIUM 100 MG: 50 TABLET, FILM COATED ORAL at 10:00

## 2020-08-15 RX ADMIN — CEFEPIME 1 G: 1 INJECTION, POWDER, FOR SOLUTION INTRAMUSCULAR; INTRAVENOUS at 18:18

## 2020-08-15 RX ADMIN — ATORVASTATIN CALCIUM 20 MG: 20 TABLET, FILM COATED ORAL at 14:16

## 2020-08-15 RX ADMIN — SODIUM BICARBONATE: 84 INJECTION, SOLUTION INTRAVENOUS at 22:13

## 2020-08-15 ASSESSMENT — PAIN SCALES - GENERAL: PAINLEVEL_OUTOF10: 0

## 2020-08-15 NOTE — PROGRESS NOTES
Hgb dropped from 11.2 to 8.5 and heparin was placed on hold. Fecal OCB ordered and H&H Q6H ordered.  Electronically signed by Han Melgar RN on 8/15/2020 at 2:05 PM

## 2020-08-15 NOTE — PROGRESS NOTES
Physical Therapy    Facility/Department: [facility] PROGRESSIVE CARE  Initial Assessment    NAME: Clarence Tracey  : 1935  MRN: [id number]    Date of Service: 8/15/2020    Discharge Recommendations:  Patient would benefit from continued therapy after discharge   PT Equipment Recommendations  Equipment Needed: No  Other: Advised to use RW    Assessment   Body structures, Functions, Activity limitations: Decreased functional mobility ; Decreased ADL status; Decreased strength;Decreased balance;Decreased endurance  Assessment: Pt is 1 assist for mobility, advised to use RW initially. Pt would benefit from continued PT upon d/c. Treatment Diagnosis: Impaired functional mobility 2* TOYA  Specific instructions for Next Treatment: progress gait/stairs, HEP  Prognosis: Good  Decision Making: Low Complexity  History: Clarence Tracey is a 81 y.o. female who presents to the emergency department with referral from PCP for abnormal labs. Patient endorses a one-week history of generalized malaise and generalized weakness without focal weakness or sensory loss. She also endorses a 3 to 4-day history of vertigo and right-sided hearing loss which happened suddenly. She has tried eardrops for the past 2 days without improvement in her symptoms. She was referred from her primary care doctor due to abnormal labs consisting of GFR 11 down from 33, hyponatremia at 123, and elevated anion gap. Exam: ROM, MMT, bed mobility, transfers, amb, balance, activity tolerance  Clinical Presentation: Pt friendly, cooperative for PT  Barriers to Learning: none  REQUIRES PT FOLLOW UP: Yes  Activity Tolerance  Activity Tolerance: Patient Tolerated treatment well       Patient Diagnosis(es): The primary encounter diagnosis was Acute renal failure superimposed on chronic kidney disease, unspecified CKD stage, unspecified acute renal failure type ([facility] [address] A diagnosis of Acute cystitis without hematuria was also pertinent to this visit. has a past medical history of CKD (chronic kidney disease) stage 3, GFR 30-59 ml/min ([facility]), Depression, GERD (gastroesophageal reflux disease), Gout, Hyperlipidemia, Hypertension, Hyponatremia, Metabolic acidosis, and Osteoporosis. has a past surgical history that includes bladder suspension; Total knee arthroplasty (Bilateral); Hysterectomy; Total hip arthroplasty (Bilateral); Total shoulder arthroplasty (Bilateral); Appendectomy; and eye surgery. Restrictions  Restrictions/Precautions  Restrictions/Precautions: General Precautions, Fall Risk(\"Vianey\")  Required Braces or Orthoses?: No  Implants present? : Metal implants(B shoulder, B THR, B TKR)  Position Activity Restriction  Other position/activity restrictions: up with assistance  Vision/Hearing  Vision: Impaired  Vision Exceptions: Wears glasses at all times  Hearing: Within functional limits  Hearing Exceptions: (Decreased R hearing- slowly coming back, decreased last week)     Subjective  General  Chart Reviewed: Yes  Patient assessed for rehabilitation services?: Yes  Family / Caregiver Present: No  Referring Practitioner: Danica Araujo MD   Referral Date : 08/14/20  Diagnosis: TOYA  Follows Commands: Within Functional Limits  Subjective  Subjective: Pt using restroom, SIDNEY De Leon in room. Pt is agreeable to PT. SIDNEY Beth.   Pain Screening  Patient Currently in Pain: Denies  Vital Signs  Patient Currently in Pain: Denies  Oxygen Therapy  O2 Device: None (Room air)       Orientation  Orientation  Overall Orientation Status: Within Normal Limits  Social/Functional History  Social/Functional History  Lives With: Alone  Type of Home: House  Home Layout: Two level, 1/2 bath on main level, Laundry in basement  Home Access: Stairs to enter with rails  Entrance Stairs - Number of Steps: 5, flight with landing R HR, flight of stairs with curve to basement, R HR  Entrance Stairs - Rails: Right  Bathroom Shower/Tub: Tub/Shower unit, Curtain, Shower chair with back  Bathroom Toilet: Handicap height  Bathroom Equipment: Grab bars in shower, Shower chair, Hand-held shower  Bathroom Accessibility: Accessible(RW fits in upstairs bathroom)  Home Equipment: Rolling walker, Quad cane, 4 wheeled walker  ADL Assistance: [address]: Independent  Homemaking Responsibilities: Yes  Ambulation Assistance: Independent(quad cane; RW for community use)  Transfer Assistance: Independent  Active : Yes  Mode of Transportation: Car  Occupation: Retired  Type of occupation: RN - retired at 77years old  IADL Comments: sleeps in flat bed  Additional Comments: No home therapy prior to admit. Son works, checks in on patient every day. Cognition        Objective          AROM RLE (degrees)  RLE AROM: WFL  AROM LLE (degrees)  LLE AROM : WFL  AROM RUE (degrees)  RUE AROM : WFL  AROM LUE (degrees)  LUE AROM : WFL  Strength RLE  Strength RLE: WFL  Comment: Grossly 4-/5  Strength LLE  Strength LLE: WFL  Comment: Grossly 4-/5  Strength RUE  Strength RUE: WFL  Comment: Grossly 4-/5  Strength LUE  Strength LUE: WFL  Comment: Grossly 4-/5     Sensation  Overall Sensation Status: WFL(denies)  Bed mobility  Rolling to Left: Stand by assistance  Supine to Sit: Stand by assistance  Sit to Supine: Stand by assistance  Scooting: Stand by assistance  Comment: Flat bed, minimal cues. No dizziness reported. Transfers  Sit to Stand: Contact guard assistance;Stand by assistance  Stand to sit: Contact guard assistance;Stand by assistance  Bed to Chair: Stand by assistance;Contact guard assistance  Comment: using RW, cues for technique. Ambulation  Ambulation?: Yes  More Ambulation?: Yes  Ambulation 1  Surface: level tile  Device: Small Base Quad Cane(LUE)  Assistance: Minimal assistance;Contact guard assistance  Quality of Gait: slight increased KATHY, slow marc, \"furniture\" walking, no LOB, unsteady  Gait Deviations: Slow Marc;Decreased head and trunk rotation;Staggers; Increased KATHY  Distance: 8'  Comments: Using quad cane, pt reaching for objects for stability  Ambulation 2  Surface - 2: level tile  Device 2: Rolling Walker  Assistance 2: Stand by assistance;Contact guard assistance  Quality of Gait 2: slowed marc, improved stability, no LOB  Gait Deviations: Slow Marc  Distance: 15'  Comments: Improved stability and safety wtih RW. Writer educated pt on using RW upon d/c initially. Stairs/Curb  Stairs?: No     Balance  Posture: Good  Sitting - Static: Good  Sitting - Dynamic: Good;-  Standing - Static: Fair;+  Standing - Dynamic: Fair  Comments: Fall risk, standing balance with RW. Plan   Plan  Times per week: 5-6x/week  Specific instructions for Next Treatment: progress gait/stairs, HEP  Current Treatment Recommendations: Strengthening, Balance Training, Functional Mobility Training, Transfer Training, Endurance Training, Gait Training, Stair training, Safety Education & Training, Home Exercise Program, Patient/Caregiver Education & Training, Equipment Evaluation, Education, & procurement  Safety Devices  Type of devices: All fall risk precautions in place, Call light within reach, Gait belt, Patient at risk for falls, Left in chair, Nurse notified(SIDNEY Morrow)  Restraints  Initially in place: No    G-Code       OutComes Score                                                  AM-PAC Score  AM-PAC Inpatient Mobility Raw Score : 16 (08/15/20 1312)  AM-PAC Inpatient T-Scale Score : 40.78 (08/15/20 1312)  Mobility Inpatient CMS 0-100% Score: 54.16 (08/15/20 1312)  Mobility Inpatient CMS G-Code Modifier : CK (08/15/20 1312)          Goals  Short term goals  Time Frame for Short term goals: 2-3 treatments  Short term goal 1: Pt to demonstrate IND bed mobility. Short term goal 2: Pt to demonstrate Mod I transfers. Short term goal 3: Pt to amb 150' Mod I. Short term goal 4: Pt to ascend/descend 1 flight of stairs, Mod I with quad cane prn.   Short term goal 5: Pt to improve BLE strength by 1/2 MMG with HEP. Patient Goals   Patient goals :  To go home       Therapy Time   Individual Concurrent Group Co-treatment   Time In 1302         Time Out 1324         Minutes [address], [place]

## 2020-08-15 NOTE — RESULT ENCOUNTER NOTE
Addressed in ED     Future Appointments  8/19/2020  2:15 PM    ELIZABET Mills -* [facility]  12/4/2020  2:00 PM    [facility]underson [facility]     [facility]

## 2020-08-15 NOTE — H&P
History and Physical Service  [facility] Internal Medicine    HISTORY AND PHYSICAL EXAMINATION            Date:   8/15/2020  Patient name:  Too Burnett  MRN:   [id number]  Account:  [de-identified]  YOB: 1935  PCP:    Letha Townsend MD  Code Status:    Full Code    Chief Complaint:     Chief Complaint   Patient presents with    Abnormal Lab         History Obtained From:     Patient, EMR, nursing staff  His  HPI   tory Obtained From:  Past MediPast Medical History:domitila History: This patient is a 81 y.o. Non-/non  femalewho presents with lab abnormalities noted by primary care doctor. Patient reported some new right-sided hearing loss  1 week history of generalized weakness. 3 to 4-day history of vertigo and right-sided hearing loss-no aggravating or relieving factors identified  ER course-noted to be hyponatremic, metabolic acidosis, hyponatremia, CT head without contrast unremarkable    Past medical history includes CKD stage III, depression, hypertension hyperlipidemia, osteoporosis, GERD    Review of Systems:     -Denies any fevers or chills. Denies any cough or shortness of breath. Denies any chest pain or palpitations. Denies any nausea vomiting diarrhea  Reports right-sided hearing loss started 1 week ago associated with spinning sensation however resolving now can hear much better today. A 10 point review of systems was performed and and negative except as mentioned in HPI  Positive and Negative as described in HPI.       Past Medical History:     Past Medical History:   Diagnosis Date    CKD (chronic kidney disease) stage 3, GFR 30-59 ml/min ([facility])     Depression     GERD (gastroesophageal reflux disease)     Gout     Hyperlipidemia 3/22/2016    Hypertension     Hyponatremia     Metabolic acidosis     Osteoporosis         Past Surgical History:     Past Surgical History:   Procedure Laterality Date    APPENDECTOMY      BLADDER SUSPENSION  EYE SURGERY      bilat catract removal    HYSTERECTOMY      SHOULDER ARTHROPLASTY Bilateral     TOTAL HIP ARTHROPLASTY Bilateral     TOTAL KNEE ARTHROPLASTY Bilateral         Medications Prior to Admission:     Prior to Admission medications    Medication Sig Start Date End Date Taking? Authorizing Provider   omeprazole (PRILOSEC) 20 MG delayed release capsule Take 20 mg by mouth daily   Yes Historical Provider, MD   neomycin-polymyxin-hydrocortisone (CORTISPORIN) 3.5-34132-7 otic suspension Place 3 drops into the right ear 4 times daily For 10 days; Started 8/13/2020   Yes Historical Provider, MD   allopurinol (ZYLOPRIM) 100 MG tablet Take 100 mg by mouth daily    Yes Historical Provider, MD   losartan (COZAAR) 100 MG tablet Take 1 tablet by mouth daily 10/10/19  Yes Cheryl Maxwell MD   hydrALAZINE (APRESOLINE) 25 MG tablet TAKE 1 TABLET THREE TIMES A DAY 10/10/19  Yes Cheryl Maxwell MD   furosemide (LASIX) 20 MG tablet Take 1 tablet by mouth daily 10/10/19  Yes Cheryl Maxwell MD   cloNIDine (CATAPRES) 0.2 MG tablet Take 1 tablet by mouth daily  Patient taking differently: Take 0.2 mg by mouth nightly  10/10/19  Yes Cheryl Maxwell MD   atorvastatin (LIPITOR) 20 MG tablet Take 1 tablet by mouth daily 10/10/19  Yes Cheryl Maxwell MD   aspirin 81 MG tablet Take 1 tablet by mouth daily 6/15/18  Yes Cheryl Maxwell MD   Multiple Vitamins-Minerals (CENTRUM SILVER PO) Take 1 tablet by mouth daily   Yes Historical Provider, MD   Multiple Vitamins-Minerals (OCUVITE ADULT 50+ PO) Take 1 tablet by mouth daily   Yes Historical Provider, MD        Allergies:     Norvasc [amlodipine besylate]    Social History:     Tobacco:    reports that she has never smoked. She has never used smokeless tobacco.  Alcohol:      reports current alcohol use of about 2.0 standard drinks of alcohol per week. Drug Use:  reports no history of drug use.     Family History:     Family History   Problem Relation Age of Onset    Cancer Mother         breast cancer    Emphysema Mother     Cancer Father         Prostate cancer    Heart Disease Maternal Grandfather     Heart Disease Paternal Grandfather            Physical Exam:   BP (!) 142/70   Pulse 86   Temp 98.1 °F (36.7 °C) (Oral)   Resp 16   Ht 5' (1.524 m)   Wt 150 lb 9.2 oz (68.3 kg)   SpO2 97%   BMI 29.41 kg/m²   Recent Labs     08/14/20  2306 08/14/20  2346 08/15/20  0744 08/15/20  1142   POCGLU 53* 113* 89 114*       General Appearance:  alert, well appearing, and in no acute distress  Mental status: oriented to person, place, and time with normal affect  Head:  normocephalic, atraumatic. Eye: no icterus, redness, pupils equal and reactive, extraocular eye movements intact, conjunctiva clear  Ear: normal external ear, no discharge, reduced hearing on the right side  Nose:  no drainage noted  Mouth: mucous membranes moist  Neck: supple, no carotid bruits, thyroid not palpable  Lungs: Bilateral equal air entry, clear to ausculation, no wheezing, rales or rhonchi, normal effort  Cardiovascular: normal rate, regular rhythm, no murmur, gallop, rub.   Abdomen: Soft, nontender, nondistended, normal bowel sounds, no hepatomegaly or splenomegaly  Neurologic: There are no new focal motor or sensory deficits, normal muscle tone and bulk, no abnormal sensation, normal speech, cranial nerves II through XII grossly intact  Skin: No gross lesions, rashes, bruising or bleeding on exposed skin area  Extremities:  peripheral pulses palpable, no pedal edema or calf pain with palpation  Psych: normal affect     Investigations:      Laboratory Testing:  Recent Results (from the past 24 hour(s))   POC Glucose Fingerstick    Collection Time: 08/14/20  3:54 PM   Result Value Ref Range    POC Glucose 63 (L) 65 - 105 mg/dL   Blood Gas, Venous    Collection Time: 08/14/20  4:00 PM   Result Value Ref Range    pH, Cecil 7.317 (L) 7.320 - 7.420    pCO2, Cecil 27.5 (L) 39.0 - 55.0    pO2, Cecil 31.6 30.0 - 50.0    HCO3, Venous 14.0 (L) 24.0 - 30.0 mmol/L    Positive Base Excess, Cecil NOT REPORTED 0.0 - 2.0 mmol/L    Negative Base Excess, Cecil 12.1 (H) 0.0 - 2.0 mmol/L    O2 Sat, Cecil 59.5 (L) 60.0 - 85.0 %    Total Hb NOT REPORTED 12.0 - 16.0 g/dl    Oxyhemoglobin NOT REPORTED 95.0 - 98.0 %    Carboxyhemoglobin 2.2 0 - 5 %    Methemoglobin 0.3 0.0 - 1.9 %    Pt Temp 37     pH, Cecil, Temp Adj NOT REPORTED 7.320 - 7.420    pCO2, Cecil, Temp Adj NOT REPORTED 39.0 - 55.0 mmHg    pO2, Cecil, Temp Adj NOT REPORTED 30.0 - 50.0 mmHg    O2 Device/Flow/% NOT REPORTED     Respiratory Rate NOT REPORTED     Jorge Test NOT REPORTED     Sample Site NOT REPORTED     Pt.  Position NOT REPORTED     Mode NOT REPORTED     Set Rate NOT REPORTED     Total Rate NOT REPORTED     VT NOT REPORTED     FIO2 NOT REPORTED     Peep/Cpap NOT REPORTED     PSV NOT REPORTED     Text for Respiratory NOT REPORTED     NOTIFICATION NOT REPORTED     NOTIFICATION TIME NOT REPORTED    TOX SCR, BLD, ED    Collection Time: 08/14/20  4:20 PM   Result Value Ref Range    Acetaminophen Level <5 (L) 10 - 30 ug/mL    Ethanol <10 <10 mg/dL    Ethanol percent <0.810 %    Salicylate Lvl <1 (L) 3 - 10 mg/dL    Toxic Tricyclic Sc,Blood WRONG TEST ORDERED NEGATIVE   Beta Hydroxybutyrate    Collection Time: 08/14/20  4:20 PM   Result Value Ref Range    Beta-Hydroxybutyrate 6.02 (H) 0.02 - 0.27 mmol/L   CBC Auto Differential    Collection Time: 08/14/20  4:20 PM   Result Value Ref Range    WBC 7.3 3.5 - 11.0 k/uL    RBC 3.41 (L) 4.0 - 5.2 m/uL    Hemoglobin 11.2 (L) 12.0 - 16.0 g/dL    Hematocrit 33.4 (L) 36 - 46 %    MCV 98.0 80 - 100 fL    MCH 32.9 26 - 34 pg    MCHC 33.5 31 - 37 g/dL    RDW 16.9 (H) 11.5 - 14.9 %    Platelets 761 316 - 627 k/uL    MPV 10.0 6.0 - 12.0 fL    NRBC Automated NOT REPORTED per 100 WBC    Differential Type NOT REPORTED     Seg Neutrophils 80 (H) 36 - 66 %    Lymphocytes 13 (L) 24 - 44 %    Monocytes 6 1 - 7 %    Eosinophils % 0 0 - 4 %    Basophils 1 0 - 2 %    Immature Granulocytes NOT REPORTED 0 %    Segs Absolute 5.80 1.3 - 9.1 k/uL    Absolute Lymph # 0.90 (L) 1.0 - 4.8 k/uL    Absolute Mono # 0.50 0.1 - 1.3 k/uL    Absolute Eos # 0.00 0.0 - 0.4 k/uL    Basophils Absolute 0.10 0.0 - 0.2 k/uL    Absolute Immature Granulocyte NOT REPORTED 0.00 - 0.30 k/uL    WBC Morphology NOT REPORTED     RBC Morphology NOT REPORTED     Platelet Estimate NOT REPORTED    Comprehensive Metabolic Panel    Collection Time: 08/14/20  4:20 PM   Result Value Ref Range    Glucose 61 (L) 70 - 99 mg/dL    BUN 62 (H) 8 - 23 mg/dL    CREATININE 3.92 (H) 0.50 - 0.90 mg/dL    Bun/Cre Ratio NOT REPORTED 9 - 20    Calcium 8.6 8.6 - 10.4 mg/dL    Sodium 121 (L) 135 - 144 mmol/L    Potassium 4.7 3.7 - 5.3 mmol/L    Chloride 82 (L) 98 - 107 mmol/L    CO2 13 (L) 20 - 31 mmol/L    Anion Gap 26 (H) 9 - 17 mmol/L    Alkaline Phosphatase 104 35 - 104 U/L    ALT 12 5 - 33 U/L    AST 44 (H) <32 U/L    Total Bilirubin 0.83 0.3 - 1.2 mg/dL    Total Protein 6.8 6.4 - 8.3 g/dL    Alb 4.3 3.5 - 5.2 g/dL    Albumin/Globulin Ratio NOT REPORTED 1.0 - 2.5    GFR Non-African American 11 (L) >60 mL/min    GFR  13 (L) >60 mL/min    GFR Comment          GFR Staging NOT REPORTED    Magnesium    Collection Time: 08/14/20  4:20 PM   Result Value Ref Range    Magnesium 1.8 1.6 - 2.6 mg/dL   Calcium, Ionized    Collection Time: 08/14/20  4:20 PM   Result Value Ref Range    Calcium, Ion 1.08 (L) 1.13 - 1.33 mmol/L   PHOSPHORUS    Collection Time: 08/14/20  4:20 PM   Result Value Ref Range    Phosphorus 3.6 2.6 - 4.5 mg/dL   Protime-INR    Collection Time: 08/14/20  4:20 PM   Result Value Ref Range    Protime 13.0 11.8 - 14.6 sec    INR 1.0    APTT    Collection Time: 08/14/20  4:20 PM   Result Value Ref Range    PTT 28.9 24.0 - 36.0 sec   Lactic Acid, Plasma    Collection Time: 08/14/20  4:20 PM   Result Value Ref Range    Lactic Acid 1.6 0.5 - 2.2 mmol/L    Lactic Acid, Whole Blood NOT REPORTED 0.7 - 2.1 mmol/L   Osmolality    Collection Time: 08/14/20  4:20 PM   Result Value Ref Range    Serum Osmolality 282 275 - 295 mOsm/kg   Lipase    Collection Time: 08/14/20  4:20 PM   Result Value Ref Range    Lipase 43 13 - 60 U/L   Troponin    Collection Time: 08/14/20  4:20 PM   Result Value Ref Range    Troponin, High Sensitivity 51 (H) 0 - 14 ng/L    Troponin T NOT REPORTED <0.03 ng/mL    Troponin Interp NOT REPORTED    EKG 12 Lead    Collection Time: 08/14/20  4:20 PM   Result Value Ref Range    Ventricular Rate 88 BPM    Atrial Rate 88 BPM    P-R Interval 212 ms    QRS Duration 88 ms    Q-T Interval 358 ms    QTc Calculation (Bazett) 433 ms    P Axis 27 degrees    R Axis -43 degrees    T Axis 60 degrees   Urinalysis, reflex to microscopic    Collection Time: 08/14/20  5:11 PM   Result Value Ref Range    Color, UA YELLOW YELLOW    Turbidity UA CLOUDY (A) CLEAR    Glucose, Ur NEGATIVE NEGATIVE    Bilirubin Urine (A) NEGATIVE     Presumptive positive. Unable to confirm due to unavailability of reagent.     Ketones, Urine TRACE (A) NEGATIVE    Specific [employer], UA 1.011 1.000 - 1.030    Urine Hgb NEGATIVE NEGATIVE    pH, UA 5.5 5.0 - 8.0    Protein, UA NEGATIVE NEGATIVE    Urobilinogen, Urine Normal Normal    Nitrite, Urine NEGATIVE NEGATIVE    Leukocyte Esterase, Urine LARGE (A) NEGATIVE    Urinalysis Comments NOT REPORTED    OSMOLALITY, URINE    Collection Time: 08/14/20  5:11 PM   Result Value Ref Range    Osmolality, Ur 274 80 - 1300 mOsm/kg   Drug screen, tricyclic    Collection Time: 08/14/20  5:11 PM   Result Value Ref Range    Tricyclic Antidep,Urine NEGATIVE NEGATIVE   Microscopic Urinalysis    Collection Time: 08/14/20  5:11 PM   Result Value Ref Range    -          WBC, UA 20 TO 50 /HPF    RBC, UA 0 TO 2 /HPF    Casts UA NOT REPORTED /LPF    Crystals, UA NOT REPORTED None /HPF    Epithelial Cells UA 2 TO 5 /HPF    Renal Epithelial, UA NOT REPORTED 0 /HPF    Bacteria, UA MODERATE (A) None    Mucus, UA NOT REPORTED None    Trichomonas, UA NOT REPORTED None    Amorphous, UA 1+ (A) None    Other Observations UA NOT REPORTED NOT REQ. Yeast, UA NOT REPORTED None   Troponin    Collection Time: 08/14/20  6:57 PM   Result Value Ref Range    Troponin, High Sensitivity 47 (H) 0 - 14 ng/L    Troponin T NOT REPORTED <0.03 ng/mL    Troponin Interp NOT REPORTED    COVID-19    Collection Time: 08/14/20  8:18 PM    Specimen: Other   Result Value Ref Range    SARS-CoV-2          SARS-CoV-2, Rapid Not Detected Not Detected    Source . NASOPHARYNGEAL SWAB     SARS-CoV-2, PCR         POC Glucose Fingerstick    Collection Time: 08/14/20 11:06 PM   Result Value Ref Range    POC Glucose 53 (L) 65 - 105 mg/dL   POC Glucose Fingerstick    Collection Time: 08/14/20 11:46 PM   Result Value Ref Range    POC Glucose 113 (H) 65 - 105 mg/dL   Basic Metabolic Panel w/ Reflex to MG    Collection Time: 08/15/20  5:10 AM   Result Value Ref Range    Glucose 82 70 - 99 mg/dL    BUN 58 (H) 8 - 23 mg/dL    CREATININE 3.49 (H) 0.50 - 0.90 mg/dL    Bun/Cre Ratio NOT REPORTED 9 - 20    Calcium 7.3 (L) 8.6 - 10.4 mg/dL    Sodium 128 (L) 135 - 144 mmol/L    Potassium 3.8 3.7 - 5.3 mmol/L    Chloride 93 (L) 98 - 107 mmol/L    CO2 18 (L) 20 - 31 mmol/L    Anion Gap 17 9 - 17 mmol/L    GFR Non-African American 13 (L) >60 mL/min    GFR African American 15 (L) >60 mL/min    GFR Comment          GFR Staging NOT REPORTED    Osmolality    Collection Time: 08/15/20  5:10 AM   Result Value Ref Range    Serum Osmolality 292 275 - 295 mOsm/kg   CBC Auto Differential    Collection Time: 08/15/20  5:10 AM   Result Value Ref Range    WBC 6.0 3.5 - 11.0 k/uL    RBC 2.54 (L) 4.0 - 5.2 m/uL    Hemoglobin 8.5 (L) 12.0 - 16.0 g/dL    Hematocrit 24.6 (L) 36 - 46 %    MCV 96.7 80 - 100 fL    MCH 33.3 26 - 34 pg    MCHC 34.4 31 - 37 g/dL    RDW 16.7 (H) 11.5 - 14.9 %    Platelets 388 040 - 167 k/uL    MPV 9.0 6.0 - 12.0 fL    NRBC Automated NOT REPORTED per 100 WBC    Differential Type NOT ANIONGAP 28* 26*  --  17   LABGLOM 11* 11*  --  13*   GFRAA 13* 13*  --  15*   CALCIUM 8.6 8.6  --  7.3*   CAION  --  1.08*  --   --    PHOS 4.4 3.6  --   --    TROPONINT  --  NOT REPORTED NOT REPORTED  --    CKTOTAL  --   --   --  562*   LACTACIDWB  --  NOT REPORTED  --   --      Recent Labs     08/14/20  0945 08/14/20  1554 08/14/20  1620 08/14/20  2306 08/14/20  2346 08/15/20  0510 08/15/20  0744 08/15/20  1142   PROT 7.1  --  6.8  --   --   --   --   --    LABALBU 4.4  --  4.3  --   --   --   --   --    LABA1C 4.9  --   --   --   --   --   --   --    TSH 4.17  --   --   --   --   --   --   --    AST 39*  --  44*  --   --   --   --   --    ALT 11  --  12  --   --   --   --   --    ALKPHOS 110*  --  104  --   --   --   --   --    BILITOT 0.82  --  0.83  --   --   --   --   --    LIPASE  --   --  43  --   --   --   --   --    URICACID 5.8*  --   --   --   --  6.0*  --   --    POCGLU  --  63*  --  53* 113*  --  89 114*       Imaging/Diagnostics:       Ct Head Wo Contrast    Result Date: 8/14/2020  EXAMINATION: CT OF THE HEAD WITHOUT CONTRAST  8/14/2020 4:59 pm TECHNIQUE: CT of the head was performed without the administration of intravenous contrast. Dose modulation, iterative reconstruction, and/or weight based adjustment of the mA/kV was utilized to reduce the radiation dose to as low as reasonably achievable. COMPARISON: 02/06/2015 HISTORY: ORDERING SYSTEM PROVIDED HISTORY: Loss of hearing with dizziness R ear for several days, please eval for lesion/mass TECHNOLOGIST PROVIDED HISTORY: Loss of hearing with dizziness R ear for several days, please eval for lesion/mass Reason for Exam: Loss of hearing with dizziness R ear for several days, please eval for lesion/mass Acuity: Acute Type of Exam: Initial Relevant Medical/Surgical History: Hx HBP controlled with meds; no hx stroke or surgery to area of interest FINDINGS: BRAIN/VENTRICLES: The ventricles and sulci are diffusely enlarged.   Low attenuation is seen in the periventricular and subcortical white matter. No acute intracranial hemorrhage or acute infarct is identified. ORBITS: The visualized portion of the orbits demonstrate no acute abnormality. SINUSES: The visualized paranasal sinuses and mastoid air cells demonstrate no acute abnormality. SOFT TISSUES/SKULL:  No acute abnormality of the visualized skull or soft tissues. No acute intracranial abnormality. Diffuse atrophic changes with findings suggesting chronic microvascular ischemia     Xr Chest Portable    Result Date: 8/14/2020  EXAMINATION: ONE XRAY VIEW OF THE CHEST 8/14/2020 4:47 pm COMPARISON: 07/09/2013 HISTORY: ORDERING SYSTEM PROVIDED HISTORY: High anion gap, malaise TECHNOLOGIST PROVIDED HISTORY: High anion gap, malaise Reason for Exam: high anion gap, malaise Acuity: Unknown Type of Exam: Unknown FINDINGS: Heart size is within normal limits. There is a moderate-sized retrocardiac hiatal hernia. Minimal blunting of the bilateral costophrenic angles. Lungs are otherwise clear. No pneumothorax. Bilateral reverse shoulder prosthesis. Prior resection of the distal right clavicle. No acute bone finding. Minimal blunting of the costophrenic angles. This may represent atelectasis and or trace pleural fluid. Lungs are otherwise clear. Moderate-sized hiatal hernia.         Current Facility-Administered Medications   Medication Dose Route Frequency Provider Last Rate Last Dose    [Held by provider] losartan (COZAAR) tablet 100 mg  100 mg Oral Daily Bea Rahman MD   100 mg at 08/15/20 1000    cefepime (MAXIPIME) 1 g IVPB minibag  1 g Intravenous Q24H Johney Scheuermann, MD   Stopped at 08/14/20 1945    sodium chloride flush 0.9 % injection 10 mL  10 mL Intravenous 2 times per day Johney Scheuermann, MD        sodium chloride flush 0.9 % injection 10 mL  10 mL Intravenous PRN Johney Scheuermann, MD        acetaminophen (TYLENOL) tablet 650 mg  650 mg Oral Q6H PRN Johney Scheuermann, MD        Or   Tico Tamayo check FOBT, monitor H&H every 6 with sodium.   7. Right-sided hearing loss resolving-possible vestibular neuritis      DVT pplx-mechanical  Antibiotics started/continued-cefepime      Kian Fish MD  8/15/2020  1:53 PM

## 2020-08-15 NOTE — PLAN OF CARE
Problem: Falls - Risk of:  Goal: Will remain free from falls  Description: Will remain free from falls  Outcome: Ongoing  Note: Pt. Free of falls and injuries this shift.

## 2020-08-15 NOTE — FLOWSHEET NOTE
Patient was a nurse here for many years. She confirmed living will and HPOA which are on file. She expressed feeling \"much better\" than when she came in. She appreciated listening presence. 08/15/20 1323   Encounter Summary   Services provided to: Patient and family together   Referral/Consult From: [address] [place] [address] Road Visiting   (8-15-20)   Complexity of Encounter Moderate   Length of Encounter 15 minutes   Advance Care Planning Yes   Routine   Type Initial   Assessment Calm; Approachable   Intervention Active listening;Explored feelings, thoughts, concerns;[place];Sustaining presence/ Ministry of presence; Discussed illness/injury and it's impact   Outcome Expressed gratitude;Engaged in conversation;Expressed feelings/needs/concerns;Coping   Advance Directives (For Healthcare)   Healthcare Directive Yes, patient has an advance directive for healthcare treatment   Type of Healthcare Directive Durable power of  for health care;Living will   Copy in Chart Yes, copy in chart   Chart Copy Status : Active;Current   Date Reviewed and Current: 08/15/20   Healthcare Agent Appointed Adult Children   If you are unable to speak for yourself, does your Healthcare Agent or Legal Spokesperson know your healthcare wishes?  Yes

## 2020-08-15 NOTE — CONSULTS
Department of Internal Medicine  Nephrology Noy Frances MD   Consult Note      SUBJECTIVE: This is a 81 y.o. female with a significant past medical history of Hyperlipidemia, systemic hypertension, osteoporosis and chronic kidney disease stage III [baseline serum creatinine 1.8 mg/dL but did require acute hemodialysis few years ago for acute kidney injury and follows up with physicians at [facility] [place]], who was sent to the emergency department by the primary care physician after finding of worsening renal function on outpatient laboratory studies. She had been evaluated over the phone for earaches and worsening hearing loss. She denied nausea, vomiting or diarrhea and does not have fever or cough or shortness of breath. Laboratory studies at presentation were remarkable for serum sodium 123 mmol/L, bicarbonate 12 mmol/L with anion gap of 28 and elevated BUN/creatinine 62/3.99 mg/dL and hence nephrology consultation. She does not have any new complaints today. Home medications include potassium chloride and allopurinol as well as losartan 100 mg p.o. daily and furosemide 20 mg p.o. daily.     Norvasc [amlodipine besylate]    Past Medical History:   Diagnosis Date    CKD (chronic kidney disease) stage 3, GFR 30-59 ml/min ([facility])     Depression     GERD (gastroesophageal reflux disease)     Gout     Hyperlipidemia 3/22/2016    Hypertension     Hyponatremia     Metabolic acidosis     Osteoporosis        Scheduled Meds:   [Held by provider] losartan  100 mg Oral Daily    cefepime  1 g Intravenous Q24H    sodium chloride flush  10 mL Intravenous 2 times per day    [Held by provider] heparin (porcine)  5,000 Units Subcutaneous 3 times per day     Continuous Infusions:   IV infusion builder 100 mL/hr at 08/15/20 0958    dextrose       PRN Meds:.sodium chloride flush, acetaminophen **OR** acetaminophen, polyethylene glycol, promethazine **OR** ondansetron, glucose, dextrose, glucagon (rDNA), dextrose    Family History   Problem Relation Age of Onset    Cancer Mother         breast cancer    Emphysema Mother     Cancer Father         Prostate cancer    Heart Disease Maternal Grandfather     Heart Disease Paternal Grandfather         Social History     Socioeconomic History    Marital status:      Spouse name: None    Number of children: None    Years of education: None    Highest education level: None   Occupational History    None   Social Needs    Financial resource strain: None    Food insecurity     Worry: None     Inability: None    Transportation needs     Medical: None     Non-medical: None   Tobacco Use    Smoking status: Never Smoker    Smokeless tobacco: Never Used   Substance and Sexual Activity    Alcohol use: Yes     Alcohol/week: 2.0 standard drinks     Types: 2 Standard drinks or equivalent per week     Comment: occasionally    Drug use: No    Sexual activity: None   Lifestyle    Physical activity     Days per week: None     Minutes per session: None    Stress: None   Relationships    Social connections     Talks on phone: None     Gets together: None     Attends [employer] service: None     Active member of club or organization: None     Attends meetings of clubs or organizations: None     Relationship status: None    Intimate partner violence     Fear of current or ex partner: None     Emotionally abused: None     Physically abused: None     Forced sexual activity: None   Other Topics Concern    None   Social History Narrative    None       Review of systems: CNS - no headache or dizziness; Cardiac - no chest pain; Respiratory - no shortness of breath; Gastrointestinal - No nausea, vomiting or diarrhea; Musculoskeletal - general body aches; Skin/Integument - no rashes.     Physical Exam:    VITALS:  BP (!) 142/70   Pulse 86   Temp 98.1 °F (36.7 °C) (Oral)   Resp 16   Ht 5' (1.524 m)   Wt 150 lb 9.2 oz (68.3 kg)   SpO2 97%   BMI 29.41 kg/m² 24HR INTAKE/OUTPUT:    Intake/Output Summary (Last 24 hours) at 8/15/2020 1213  Last data filed at 8/15/2020 6589  Gross per 24 hour   Intake 1017 ml   Output --   Net 1017 ml       Constitutional: alert, appears stated age and cooperative    Skin: Skin color, texture, turgor normal. No rashes or lesions    Head: Normocephalic, without obvious abnormality, atraumatic     Cardiovascular/Edema: regular rate and rhythm, S1, S2 normal, no murmur, click, rub or gallop    Respiratory: Lungs: clear to auscultation bilaterally    Abdomen: soft, non-tender; bowel sounds normal; no masses,  no organomegaly    Back: symmetric, no curvature. ROM normal. No CVA tenderness. Extremities: extremities normal, atraumatic, no cyanosis or edema    Neuro:  Grossly normal      CBC:   Recent Labs     08/14/20  0945 08/14/20  1620 08/15/20  0510   WBC 9.6 7.3 6.0   HGB 11.7* 11.2* 8.5*    311 225     BMP:    Recent Labs     08/14/20  0945 08/14/20  1620 08/15/20  0510   * 121* 128*   K 4.4 4.7 3.8   CL 83* 82* 93*   CO2 12* 13* 18*   BUN 62* 62* 58*   CREATININE 3.99* 3.92* 3.49*   GLUCOSE 52* 61* 82       Lab Results   Component Value Date    NITRU NEGATIVE 08/14/2020    COLORU YELLOW 08/14/2020    PHUR 5.5 08/14/2020    WBCUA 20 TO 50 08/14/2020    RBCUA 0 TO 2 08/14/2020    MUCUS NOT REPORTED 08/14/2020    TRICHOMONAS NOT REPORTED 08/14/2020    YEAST NOT REPORTED 08/14/2020    BACTERIA MODERATE 08/14/2020    SPECGRAV 1.011 08/14/2020    LEUKOCYTESUR LARGE 08/14/2020    UROBILINOGEN Normal 08/14/2020    BILIRUBINUR  08/14/2020     Presumptive positive. Unable to confirm due to unavailability of reagent.     BILIRUBINUR NEGATIVE 11/23/2011    GLUCOSEU NEGATIVE 08/14/2020    GLUCOSEU NEGATIVE 11/23/2011    KETUA TRACE 08/14/2020    AMORPHOUS 1+ 08/14/2020     Urine Sodium:     Lab Results   Component Value Date    TIGRE 94 08/28/2017     Urine Potassium:  No results found for: GAURAV  Urine Chloride:    Lab Results   Component

## 2020-08-15 NOTE — PROGRESS NOTES
Admitted to room 2105 from ER per bed. Oriented to room and call light. Vitals and assessment completed. No distress noted. [facility]  DVT Prophylaxis and Vaccine Status  Work List  Mandatory for all patients      Patient must be on both Chemical prophylaxis and Mechanical prophylaxis.  If chemical/mechanical prophylaxis is not ordered, the physician must document a reason for not using prophylaxis     Chemical Prophylaxis  Is patient on chemical prophylaxis: Yes  If no chemical prophylaxis Is a order in for No Chemical VTE prophylaxisNo  If no was the physician notified not applicable      Mechanical Prophylaxis  Is patient on mechanical prophylaxis, intermittent pneumatic compression device: Yes  If no was the physician notified not applicable        Pneumonia Vaccine  Vaccine indicated:  Not indicated  If indicated was the vaccine given: not applicable    Influenza Vaccine (applicable from October through March):  Vaccine indicated: Not indicated  If indicated was the vaccine given: not applicable    Patient Education  Education completed on DVT prophylaxis: yes

## 2020-08-15 NOTE — PLAN OF CARE
Problem: Falls - Risk of:  Goal: Will remain free from falls  Description: Will remain free from falls  Outcome: Ongoing  Note: No falls this shift. Call light within reach and siderails x2. Bed in lowest position. Patient safety maintained. Goal: Absence of physical injury  Description: Absence of physical injury  Outcome: Ongoing  Note: No falls this shift. Call light within reach and siderails x2. Bed in lowest position. Patient safety maintained. Problem: Musculor/Skeletal Functional Status  Goal: Highest potential functional level  Outcome: Ongoing  Goal: Absence of falls  Outcome: Ongoing  Note: No falls this shift. Call light within reach and siderails x2. Bed in lowest position. Patient safety maintained.

## 2020-08-16 LAB
ANION GAP SERPL CALCULATED.3IONS-SCNC: 9 MMOL/L (ref 9–17)
BUN BLDV-MCNC: 39 MG/DL (ref 8–23)
BUN/CREAT BLD: ABNORMAL (ref 9–20)
CALCIUM IONIZED: 0.95 MMOL/L (ref 1.13–1.33)
CALCIUM SERPL-MCNC: 6.9 MG/DL (ref 8.6–10.4)
CHLORIDE BLD-SCNC: 95 MMOL/L (ref 98–107)
CO2: 26 MMOL/L (ref 20–31)
CREAT SERPL-MCNC: 2.38 MG/DL (ref 0.5–0.9)
GFR AFRICAN AMERICAN: 24 ML/MIN
GFR NON-AFRICAN AMERICAN: 19 ML/MIN
GFR SERPL CREATININE-BSD FRML MDRD: ABNORMAL ML/MIN/{1.73_M2}
GFR SERPL CREATININE-BSD FRML MDRD: ABNORMAL ML/MIN/{1.73_M2}
GLUCOSE BLD-MCNC: 105 MG/DL (ref 65–105)
GLUCOSE BLD-MCNC: 114 MG/DL (ref 70–99)
GLUCOSE BLD-MCNC: 117 MG/DL (ref 65–105)
HCT VFR BLD CALC: 25.2 % (ref 36–46)
HCT VFR BLD CALC: 25.6 % (ref 36–46)
HCT VFR BLD CALC: 26 % (ref 36–46)
HCT VFR BLD CALC: 26.8 % (ref 36–46)
HCT VFR BLD CALC: 28.4 % (ref 36–46)
HEMOGLOBIN: 8.5 G/DL (ref 12–16)
HEMOGLOBIN: 8.6 G/DL (ref 12–16)
HEMOGLOBIN: 8.8 G/DL (ref 12–16)
HEMOGLOBIN: 8.9 G/DL (ref 12–16)
HEMOGLOBIN: 9.5 G/DL (ref 12–16)
MAGNESIUM: 1.5 MG/DL (ref 1.6–2.6)
MCH RBC QN AUTO: 33.3 PG (ref 26–34)
MCHC RBC AUTO-ENTMCNC: 33.9 G/DL (ref 31–37)
MCV RBC AUTO: 98.4 FL (ref 80–100)
NRBC AUTOMATED: ABNORMAL PER 100 WBC
PDW BLD-RTO: 17.1 % (ref 11.5–14.9)
PLATELET # BLD: 207 K/UL (ref 150–450)
PMV BLD AUTO: 8.3 FL (ref 6–12)
POTASSIUM SERPL-SCNC: 3.2 MMOL/L (ref 3.7–5.3)
RBC # BLD: 2.56 M/UL (ref 4–5.2)
SODIUM BLD-SCNC: 129 MMOL/L (ref 135–144)
SODIUM BLD-SCNC: 130 MMOL/L (ref 135–144)
WBC # BLD: 5.3 K/UL (ref 3.5–11)

## 2020-08-16 PROCEDURE — 83735 ASSAY OF MAGNESIUM: CPT

## 2020-08-16 PROCEDURE — 2580000003 HC RX 258: Performed by: EMERGENCY MEDICINE

## 2020-08-16 PROCEDURE — 82947 ASSAY GLUCOSE BLOOD QUANT: CPT

## 2020-08-16 PROCEDURE — 6360000002 HC RX W HCPCS: Performed by: INTERNAL MEDICINE

## 2020-08-16 PROCEDURE — 85018 HEMOGLOBIN: CPT

## 2020-08-16 PROCEDURE — 2500000003 HC RX 250 WO HCPCS: Performed by: INTERNAL MEDICINE

## 2020-08-16 PROCEDURE — 82330 ASSAY OF CALCIUM: CPT

## 2020-08-16 PROCEDURE — 85027 COMPLETE CBC AUTOMATED: CPT

## 2020-08-16 PROCEDURE — 6370000000 HC RX 637 (ALT 250 FOR IP): Performed by: INTERNAL MEDICINE

## 2020-08-16 PROCEDURE — 84295 ASSAY OF SERUM SODIUM: CPT

## 2020-08-16 PROCEDURE — 6360000002 HC RX W HCPCS: Performed by: EMERGENCY MEDICINE

## 2020-08-16 PROCEDURE — 80048 BASIC METABOLIC PNL TOTAL CA: CPT

## 2020-08-16 PROCEDURE — 99233 SBSQ HOSP IP/OBS HIGH 50: CPT | Performed by: INTERNAL MEDICINE

## 2020-08-16 PROCEDURE — 85014 HEMATOCRIT: CPT

## 2020-08-16 PROCEDURE — 97110 THERAPEUTIC EXERCISES: CPT

## 2020-08-16 PROCEDURE — 36415 COLL VENOUS BLD VENIPUNCTURE: CPT

## 2020-08-16 PROCEDURE — 2060000000 HC ICU INTERMEDIATE R&B

## 2020-08-16 PROCEDURE — 2580000003 HC RX 258: Performed by: INTERNAL MEDICINE

## 2020-08-16 RX ORDER — SODIUM CHLORIDE AND POTASSIUM CHLORIDE .9; .15 G/100ML; G/100ML
SOLUTION INTRAVENOUS CONTINUOUS
Status: DISCONTINUED | OUTPATIENT
Start: 2020-08-16 | End: 2020-08-17

## 2020-08-16 RX ORDER — MAGNESIUM SULFATE 1 G/100ML
1 INJECTION INTRAVENOUS
Status: COMPLETED | OUTPATIENT
Start: 2020-08-16 | End: 2020-08-16

## 2020-08-16 RX ORDER — POTASSIUM CHLORIDE 20 MEQ/1
40 TABLET, EXTENDED RELEASE ORAL ONCE
Status: COMPLETED | OUTPATIENT
Start: 2020-08-16 | End: 2020-08-16

## 2020-08-16 RX ADMIN — CEFEPIME 1 G: 1 INJECTION, POWDER, FOR SOLUTION INTRAMUSCULAR; INTRAVENOUS at 18:10

## 2020-08-16 RX ADMIN — ALLOPURINOL 100 MG: 100 TABLET ORAL at 07:38

## 2020-08-16 RX ADMIN — HEPARIN SODIUM 5000 UNITS: 5000 INJECTION INTRAVENOUS; SUBCUTANEOUS at 14:08

## 2020-08-16 RX ADMIN — POTASSIUM CHLORIDE AND SODIUM CHLORIDE: 900; 150 INJECTION, SOLUTION INTRAVENOUS at 14:46

## 2020-08-16 RX ADMIN — MAGNESIUM SULFATE 1 G: 1 INJECTION INTRAVENOUS at 10:21

## 2020-08-16 RX ADMIN — SODIUM BICARBONATE: 84 INJECTION, SOLUTION INTRAVENOUS at 14:10

## 2020-08-16 RX ADMIN — POTASSIUM CHLORIDE 40 MEQ: 1500 TABLET, EXTENDED RELEASE ORAL at 08:48

## 2020-08-16 RX ADMIN — MAGNESIUM SULFATE 1 G: 1 INJECTION INTRAVENOUS at 11:43

## 2020-08-16 RX ADMIN — ASPIRIN 81 MG: 81 TABLET, COATED ORAL at 07:38

## 2020-08-16 RX ADMIN — ATORVASTATIN CALCIUM 20 MG: 20 TABLET, FILM COATED ORAL at 07:38

## 2020-08-16 RX ADMIN — CLONIDINE HYDROCHLORIDE 0.2 MG: 0.2 TABLET ORAL at 20:48

## 2020-08-16 RX ADMIN — HEPARIN SODIUM 5000 UNITS: 5000 INJECTION INTRAVENOUS; SUBCUTANEOUS at 20:48

## 2020-08-16 ASSESSMENT — PAIN SCALES - GENERAL: PAINLEVEL_OUTOF10: 0

## 2020-08-16 NOTE — PROGRESS NOTES
[facility] Internal Medicine    Progress Note    8/16/2020    9:15 AM    Name:   Chriss Worthy  MRN:     [id number]     Acct:      [de-identified]   Room:   [address] [place] [address] Day:  2  Admit Date:  8/14/2020  3:39 PM    PCP:   Abigail Gordillo MD  Code Status:  Full Code    Subjective:     C/C:   Chief Complaint   Patient presents with    Abnormal Lab         Interval History Status: Improving    HPI:     This patient is a 81 y.o. Non-/non  femalewho presents with lab abnormalities noted by primary care doctor. Patient reported some new right-sided hearing loss  1 week history of generalized weakness. 3 to 4-day history of vertigo and right-sided hearing loss-no aggravating or relieving factors identified  ER course-noted to be hyponatremic, metabolic acidosis, hyponatremia, CT head without contrast unremarkable     Past medical history includes CKD stage III, depression, hypertension hyperlipidemia, osteoporosis, GERD    Review of Systems:     Denies any fevers or chills. Denies cough or shortness of breath. Denies any chest pain or palpitations. Denies any nausea vomiting or diarrhea. Medications: Allergies:     Allergies   Allergen Reactions    Norvasc [Amlodipine Besylate] Other (See Comments)     Edema       Current Meds:   Scheduled Meds:    [Held by provider] losartan  100 mg Oral Daily    aspirin  81 mg Oral Daily    allopurinol  100 mg Oral Daily    atorvastatin  20 mg Oral Daily    cloNIDine  0.2 mg Oral Daily    cefepime  1 g Intravenous Q24H    sodium chloride flush  10 mL Intravenous 2 times per day    [Held by provider] heparin (porcine)  5,000 Units Subcutaneous 3 times per day     Continuous Infusions:    IV infusion builder 50 mL/hr at 08/16/20 0846    dextrose       PRN Meds: sodium chloride flush, acetaminophen **OR** acetaminophen, polyethylene glycol, promethazine **OR** ondansetron, glucose, dextrose, glucagon (rDNA), dextrose    Data:     Past Medical History:   has a past medical history of CKD (chronic kidney disease) stage 3, GFR 30-59 ml/min ([facility]), Depression, GERD (gastroesophageal reflux disease), Gout, Hyperlipidemia, Hypertension, Hyponatremia, Metabolic acidosis, and Osteoporosis. Social History:   reports that she has never smoked. She has never used smokeless tobacco. She reports current alcohol use of about 2.0 standard drinks of alcohol per week. She reports that she does not use drugs. Family History:   Family History   Problem Relation Age of Onset   Clementina Neptali Cancer Mother         breast cancer    Emphysema Mother     Cancer Father         Prostate cancer    Heart Disease Maternal Grandfather     Heart Disease Paternal Grandfather        Vitals:  BP (!) 140/77   Pulse 73   Temp 97.8 °F (36.6 °C) (Oral)   Resp 14   Ht 5' (1.524 m)   Wt 146 lb 9.7 oz (66.5 kg)   SpO2 98%   BMI 28.63 kg/m²   Temp (24hrs), Av °F (36.7 °C), Min:97.8 °F (36.6 °C), Max:98.2 °F (36.8 °C)    Recent Labs     08/15/20  1142 08/15/20  1622 08/15/20  2037 20  0702   POCGLU 114* 131* 151* 105       I/O (24Hr):     Intake/Output Summary (Last 24 hours) at 2020 0915  Last data filed at 2020 0847  Gross per 24 hour   Intake 3162 ml   Output 1600 ml   Net 1562 ml       Labs:    Lab Results   Component Value Date    WBC 5.3 2020    HGB 8.5 (L) 2020    HCT 25.2 (L) 2020    MCV 98.4 2020     2020     Lab Results   Component Value Date     2020    K 3.2 2020    CL 95 2020    CO2 26 2020    BUN 39 2020    CREATININE 2.38 2020    GLUCOSE 114 2020    GLUCOSE 88 2012    CALCIUM 6.9 2020          Lab Results   Component Value Date/Time    SPECIAL NOT REPORTED 2020 05:11 PM     Lab Results   Component Value Date/Time    CULTURE NO SIGNIFICANT GROWTH 2020 05:11 PM         Radiology:    Recent data reviewed    Physical Examination:        General appearance:  alert, cooperative and no distress  Eyes: Anicteric sclera. Pupils are equally round and reactive to light. Extraocular movements are intact. Reduced hearing on the right side but improving. Lungs:  clear to auscultation bilaterally, normal effort  Heart:  regular rate and rhythm, no murmur  Abdomen:  soft, nontender, nondistended, normal bowel sounds, no masses, hepatomegaly, splenomegaly  Extremities:  no edema, redness, tenderness in the calves  Skin:  no gross lesions, rashes, induration  Neuro:  Alert, oriented X 3, Gait normal. Non-focal.  Assessment:        Primary Problem  <principal problem not specified>    Active Hospital Problems    Diagnosis Date Noted    Significant drop in hemoglobin [facility]) [D58.2] 08/15/2020    Acute kidney injury superimposed on chronic kidney disease ([facility] [address] [N17.9, N18.9] 08/14/2020    CKD (chronic kidney disease) stage 3, GFR 30-59 ml/min ([facility]) [N18.3]            DVT prophylaxis: Mechanical  Antibiotics: None    Plan:          1. Acute renal failure on CKD,   2. Acute cystitis without hematuria, no sepsis-started on cefepime UA shows large leukoesterase, urine culture in progress. 3. Non-anion gap metabolic acidosis-patient on bicarb drip. 4. Hypoglycemia-1 episode-patient not on any hypoglycemic agents- started on dextrose containing fluid. 5. Hyponatremia- improving, nephrology following,correction has been too rapid overnight- will recheck sodium now and every 6 hours. 6. Significant drop in hemoglobin from 11.7-8.5- no overt bleeding, check FOBT, monitor H&H every 6 with sodium. 7. Right-sided hearing loss resolving-possible vestibular neuritis     8/16  Creatinine improved, acidosis resolved  UTI- culture still pending no leukocytosis or fevers.   No further episodes of hypoglycemia-rate of fluids (dextrose with bicarb) reduced to 50 cc an hour  Hyponatremia improving sodium 130 today  Hemoglobin has remained stable-will resume heparin prophylaxis  Hypomagnesemia- mag 1.6 today, will give 2 g  Hypocalcemia-we will check ionized calcium  Hypokalemia- has been replaced  PT/OT      Ricardo Hawkins MD  8/16/2020  9:15 AM

## 2020-08-16 NOTE — PROGRESS NOTES
Department of Internal Medicine  Nephrology Frances García MD   Progress Note      Interval history: Patient was seen and examined today and she feels well. She does not have shortness of breath, nausea or vomiting. She slept well last night. History of present illness: This is a 81 y.o. female with a significant past medical history of Hyperlipidemia, systemic hypertension, osteoporosis and chronic kidney disease stage III [baseline serum creatinine 1.8 mg/dL but did require acute hemodialysis few years ago for acute kidney injury and follows up with physicians at [facility] [place]], who was sent to the emergency department by the primary care physician after finding of worsening renal function on outpatient laboratory studies. She had been evaluated over the phone for earaches and worsening hearing loss. She denied nausea, vomiting or diarrhea and does not have fever or cough or shortness of breath. Laboratory studies at presentation were remarkable for serum sodium 123 mmol/L, bicarbonate 12 mmol/L with anion gap of 28 and elevated BUN/creatinine 62/3.99 mg/dL and hence nephrology consultation. She does not have any new complaints today. Home medications include potassium chloride and allopurinol as well as losartan 100 mg p.o. daily and furosemide 20 mg p.o. daily.     Physical Exam:    VITALS:  BP (!) 140/77   Pulse 73   Temp 97.8 °F (36.6 °C) (Oral)   Resp 14   Ht 5' (1.524 m)   Wt 146 lb 9.7 oz (66.5 kg)   SpO2 98%   BMI 28.63 kg/m²   24HR INTAKE/OUTPUT:      Intake/Output Summary (Last 24 hours) at 8/16/2020 1134  Last data filed at 8/16/2020 0847  Gross per 24 hour   Intake 2682 ml   Output 1600 ml   Net 1082 ml       Constitutional: alert, appears stated age and cooperative    Skin: Skin color, texture, turgor normal. No rashes or lesions    Head: Normocephalic, without obvious abnormality, atraumatic     Cardiovascular/Edema: regular rate and rhythm, S1, S2 normal, no murmur, click, rub or gallop    Respiratory: Lungs: clear to auscultation bilaterally    Abdomen: soft, non-tender; bowel sounds normal; no masses,  no organomegaly    Back: symmetric, no curvature. ROM normal. No CVA tenderness. Extremities: extremities normal, atraumatic, no cyanosis or edema    Neuro:  Grossly normal      CBC:   Recent Labs     08/14/20  1620 08/15/20  0510  08/16/20  0157 08/16/20  0529 08/16/20  1103   WBC 7.3 6.0  --   --  5.3  --    HGB 11.2* 8.5*   < > 8.6* 8.5* 8.9*    225  --   --  207  --     < > = values in this interval not displayed. BMP:    Recent Labs     08/14/20  1620 08/15/20  0510  08/16/20  0157 08/16/20  0529 08/16/20  1103   * 128*   < > 129* 130* 130*   K 4.7 3.8  --   --  3.2*  --    CL 82* 93*  --   --  95*  --    CO2 13* 18*  --   --  26  --    BUN 62* 58*  --   --  39*  --    CREATININE 3.92* 3.49*  --   --  2.38*  --    GLUCOSE 61* 82  --   --  114*  --     < > = values in this interval not displayed.        Lab Results   Component Value Date    NITRU NEGATIVE 08/15/2020    COLORU YELLOW 08/15/2020    PHUR 7.0 08/15/2020    WBCUA 2 TO 5 08/15/2020    RBCUA None 08/15/2020    MUCUS NOT REPORTED 08/15/2020    TRICHOMONAS NOT REPORTED 08/15/2020    YEAST NOT REPORTED 08/15/2020    BACTERIA FEW 08/15/2020    SPECGRAV 1.007 08/15/2020    LEUKOCYTESUR SMALL 08/15/2020    UROBILINOGEN Normal 08/15/2020    BILIRUBINUR NEGATIVE 08/15/2020    BILIRUBINUR NEGATIVE 11/23/2011    GLUCOSEU NEGATIVE 08/15/2020    GLUCOSEU NEGATIVE 11/23/2011    KETUA NEGATIVE 08/15/2020    AMORPHOUS NOT REPORTED 08/15/2020     Urine Sodium:     Lab Results   Component Value Date    TIGRE 27 08/15/2020     Urine Potassium:  No results found for: KUR  Urine Chloride:    Lab Results   Component Value Date    CLUR <20 08/15/2020     Urine Osmolarity:   Lab Results   Component Value Date    OSMOU 274 08/14/2020     Urine Protein:     Lab Results   Component Value Date    TPU 5 08/28/2017     Urine Creatinine:     Lab Results   Component Value Date    LABCREA 32.3 08/28/2017     IMPRESSION/RECOMMENDATIONS:      1. Acute kidney injury superimposed on chronic kidney disease stage 3 - most consistent with prerenal azotemia from loop diuretics as well as angiotensin receptor blocker. Renal ultrasound showed echogenic kidneys suggestive of medical renal disease but no hydronephrosis. Renal function is improved with hydration. Plan: Continue to hold furosemide and losartan. Change IV fluid to 0.9 normal saline at 50 mL/h. Basic metabolic profile daily. 2.  Anion gap metabolic acidosis - likely secondary to uremia. Resolved. 3.  Hyponatremia - Serum osmolality 292 mOsm/kg consistent with iso-osmolar hyponatremia. 4.  Systemic hypertension - . Continue Clonidine 0.2 mg p.o. daily. 5.  Hypokalemia and hypomagnesemia - replace. 6.  Hypocalcemia - check ionic calcium and intact PTH. 25-hydroxy vitamin D level. Prognosis is guarded.     Tana Najera MD FACP  Attending Nephrologist  8/16/2020 11:34 AM

## 2020-08-16 NOTE — PLAN OF CARE
Problem: Falls - Risk of:  Goal: Will remain free from falls  Description: Will remain free from falls  8/16/2020 1537 by Roseline Delgadillo RN  Outcome: Ongoing  Note: No falls this shift. Call light within reach and siderails x2. Bed in lowest position. Patient safety maintained. Problem: Falls - Risk of:  Goal: Absence of physical injury  Description: Absence of physical injury  8/16/2020 1537 by Roseline Delgadillo RN  Outcome: Ongoing  Note: No falls this shift. Call light within reach and siderails x2. Bed in lowest position. Patient safety maintained. Problem: Musculor/Skeletal Functional Status  Goal: Highest potential functional level  Outcome: Ongoing     Problem: Musculor/Skeletal Functional Status  Goal: Absence of falls  8/16/2020 1537 by Roseline Delgadillo RN  Outcome: Ongoing  Note: No falls this shift. Call light within reach and siderails x2. Bed in lowest position. Patient safety maintained.

## 2020-08-16 NOTE — PLAN OF CARE
Nutrition Problem #1: Altered nutrition-related lab values  Intervention: Food and/or Nutrient Delivery: Modify Current Diet, Start Oral Nutrition Supplement  Nutritional Goals: Meet 75% of nutrient needs with PO intake and supplements.

## 2020-08-16 NOTE — CARE COORDINATION
ONGOING DISCHARGE PLAN:    Discharge plan for the patient is home. VNS has been declined. BUN and Creat were 39 and 2.38 this am. Nephrology is on. Active orders for IV cefepime and sodium bicarb drip. Will continue to follow for additional discharge needs.     Electronically signed by Cheri Foreman RN on 8/16/2020 at 9:16 AM

## 2020-08-16 NOTE — PLAN OF CARE
Problem: Falls - Risk of:  Goal: Will remain free from falls  Description: Will remain free from falls  8/16/2020 0407 by Rosanne Coley RN  Outcome: Ongoing  Note: No falls noted this shift. Patient ambulates with x1 staff assistance without difficulty. Bed kept in low position. Safe environment maintained. Bedside table & call light in reach. Uses call light appropriately when needing assistance.         Problem: Falls - Risk of:  Goal: Absence of physical injury  Description: Absence of physical injury  8/16/2020 0407 by Rosanne Coley RN  Outcome: Ongoing     Problem: Musculor/Skeletal Functional Status  Goal: Highest potential functional level  8/15/2020 1829 by Ludin Chow RN  Outcome: Ongoing

## 2020-08-16 NOTE — PROGRESS NOTES
Physical Therapy  Facility/Department: [facility] PROGRESSIVE CARE  Daily Treatment Note  NAME: Clarence Tracey  : 1935  MRN: [id number]    Date of Service: 2020    Discharge Recommendations:  Patient would benefit from continued therapy after discharge   PT Equipment Recommendations  Equipment Needed: No  Other: Advised to use RW    Assessment   Body structures, Functions, Activity limitations: Decreased functional mobility ; Decreased ADL status; Decreased strength;Decreased balance;Decreased endurance  Assessment: Pt has improved stability today using RW. HEP handouts given and stairs trialed. Pt would benefit from continued PT upon d/c. Treatment Diagnosis: Impaired functional mobility 2* TOYA  Specific instructions for Next Treatment: progress gait distance, 1 flight of stairs  Prognosis: Good  Decision Making: Low Complexity  History: Clarence Tracey is a 81 y.o. female who presents to the emergency department with referral from PCP for abnormal labs. Patient endorses a one-week history of generalized malaise and generalized weakness without focal weakness or sensory loss. She also endorses a 3 to 4-day history of vertigo and right-sided hearing loss which happened suddenly. She has tried eardrops for the past 2 days without improvement in her symptoms. She was referred from her primary care doctor due to abnormal labs consisting of GFR 11 down from 33, hyponatremia at 123, and elevated anion gap. Exam: ROM, MMT, bed mobility, transfers, amb, balance, activity tolerance  Clinical Presentation: Pt cooperative, motivated, slightly tired today. Barriers to Learning: none  REQUIRES PT FOLLOW UP: Yes  Activity Tolerance  Activity Tolerance: Patient Tolerated treatment well     Patient Diagnosis(es): The primary encounter diagnosis was Acute renal failure superimposed on chronic kidney disease, unspecified CKD stage, unspecified acute renal failure type ([facility] [address] [address]  A diagnosis of Acute cystitis without hematuria was also pertinent to this visit. has a past medical history of CKD (chronic kidney disease) stage 3, GFR 30-59 ml/min ([facility]), Depression, GERD (gastroesophageal reflux disease), Gout, Hyperlipidemia, Hypertension, Hyponatremia, Metabolic acidosis, and Osteoporosis. has a past surgical history that includes bladder suspension; Total knee arthroplasty (Bilateral); Hysterectomy; Total hip arthroplasty (Bilateral); Total shoulder arthroplasty (Bilateral); Appendectomy; and eye surgery. Restrictions  Restrictions/Precautions  Restrictions/Precautions: General Precautions, Fall Risk(\"Vianey\")  Required Braces or Orthoses?: No  Implants present? : Metal implants(B shoulder, B THR, B TKR)  Position Activity Restriction  Other position/activity restrictions: up with assistance  Subjective   General  Chart Reviewed: Yes  Response To Previous Treatment: Patient reporting fatigue but able to participate. Family / Caregiver Present: No  Referring Practitioner: Desi Chen MD   Subjective  Subjective: Pt finished with shower. Agreeable to PT tx. Pain Screening  Patient Currently in Pain: Denies  Vital Signs  Patient Currently in Pain: Denies  Oxygen Therapy  O2 Device: None (Room air)       Orientation  Orientation  Overall Orientation Status: Within Normal Limits  Cognition      Objective   Bed mobility  Sit to Supine: Supervision  Scooting: Supervision  Comment: Head of bed slightly elevated, minimal cues needed. Pt given call light. Transfers  Sit to Stand: Supervision  Stand to sit: Supervision  Comment: Uses RW with safe technique, STS completed x2.    Ambulation  Ambulation?: Yes  Ambulation 1  Surface: level tile  Device: Rolling Walker(LUE)  Assistance: Stand by assistance  Quality of Gait: slow marc, no LOB, pt steady  Gait Deviations: Slow Marc  Distance: 45'  Comments: Safe gait with RW, limited distance due to pt tired from not sleeping well last night and recently finished

## 2020-08-16 NOTE — PROGRESS NOTES
Comprehensive Nutrition Assessment    Type and Reason for Visit:  Positive Nutrition Screen(Nausea, vomiting, poor intake)    Nutrition Recommendations/Plan: Diet changed from Renal to 4 gm Sodium. Provide Ensure Enlive twice daily. Nutrition Assessment:  Patient doesn't have nausea or vomiting eating 25-75% of meals, potassium level low being replaced therefore discontinuing Renal diet and ordering 4 gm Na diet. Provide Ensure Enlive twice daily since pt not selecting much at meals. Malnutrition Assessment:  Malnutrition Status:  No malnutrition    Context:  Acute Illness     Findings of the 6 clinical characteristics of malnutrition:  Energy Intake:  No significant decrease in energy intake  Weight Loss:  No significant weight loss     Body Fat Loss:  No significant body fat loss     Muscle Mass Loss:  No significant muscle mass loss    Fluid Accumulation:  1 - Mild     Strength:  Not Performed    Estimated Daily Nutrient Needs:  Energy (kcal):  1663 kcal based on 25 kcal/kg adm wt 66.5 kg; Weight Used for Energy Requirements:  Admission     Protein (g):  54-59 gm protein based on 1.2-1.3 gm/kg IBW; Weight Used for Protein Requirements:  Ideal            Nutrition Related Findings:  Edema: +1 non-pitting BLE's      Current Nutrition Therapies:    DIET NO SALT ADDED (3-4 GM); Anthropometric Measures:  · Height: 5' (152.4 cm)  · Current Body Weight: 146 lb (66.2 kg)   · Admission Body Weight: 146 lb (66.2 kg)     · Ideal Body Weight: 100 lbs; % Ideal Body Weight 146 %   · BMI: 28.5    · BMI Categories: Overweight (BMI 25.0-29. 9)       Nutrition Diagnosis:   · Altered nutrition-related lab values related to renal dysfunction as evidenced by intake 0-25%, intake 26-50%, lab values      Nutrition Interventions:   Food and/or Nutrient Delivery:  Modify Current Diet, Start Oral Nutrition Supplement  Nutrition Education/Counseling:  Education not indicated   Coordination of Nutrition Care:  Continued Inpatient Monitoring    Goals:  Meet 75% of nutrient needs with PO intake and supplements. Nutrition Monitoring and Evaluation:   Food/Nutrient Intake Outcomes:  Food and Nutrient Intake, Supplement Intake  Physical Signs/Symptoms Outcomes:  Biochemical Data, Nausea or Vomiting, Fluid Status or Edema, Nutrition Focused Physical Findings, Weight     Discharge Planning: Too soon to determine     Some areas of assessment may be incomplete due to COVID-19 precautions. Sam Tang R.D., L.D.   Clinical Dietitian  Office: [phone]

## 2020-08-17 VITALS
TEMPERATURE: 97.7 F | HEART RATE: 70 BPM | BODY MASS INDEX: 28.78 KG/M2 | SYSTOLIC BLOOD PRESSURE: 160 MMHG | WEIGHT: 146.61 LBS | RESPIRATION RATE: 16 BRPM | OXYGEN SATURATION: 97 % | HEIGHT: 60 IN | DIASTOLIC BLOOD PRESSURE: 82 MMHG

## 2020-08-17 LAB
ANION GAP SERPL CALCULATED.3IONS-SCNC: 7 MMOL/L (ref 9–17)
ANTI-NUCLEAR ANTIBODY (ANA): ABNORMAL
BUN BLDV-MCNC: 30 MG/DL (ref 8–23)
BUN/CREAT BLD: ABNORMAL (ref 9–20)
CALCIUM SERPL-MCNC: 7.5 MG/DL (ref 8.6–10.4)
CHLORIDE BLD-SCNC: 97 MMOL/L (ref 98–107)
CO2: 27 MMOL/L (ref 20–31)
CREAT SERPL-MCNC: 1.59 MG/DL (ref 0.5–0.9)
DATE, STOOL #1: NORMAL
DATE, STOOL #2: NORMAL
DATE, STOOL #3: NORMAL
GFR AFRICAN AMERICAN: 38 ML/MIN
GFR NON-AFRICAN AMERICAN: 31 ML/MIN
GFR SERPL CREATININE-BSD FRML MDRD: ABNORMAL ML/MIN/{1.73_M2}
GFR SERPL CREATININE-BSD FRML MDRD: ABNORMAL ML/MIN/{1.73_M2}
GLUCOSE BLD-MCNC: 94 MG/DL (ref 70–99)
HCT VFR BLD CALC: 25.2 % (ref 36–46)
HCT VFR BLD CALC: 27.9 % (ref 36–46)
HCT VFR BLD CALC: 28.2 % (ref 36–46)
HEMOCCULT SP1 STL QL: NEGATIVE
HEMOCCULT SP2 STL QL: NORMAL
HEMOCCULT SP3 STL QL: NORMAL
HEMOGLOBIN: 8.5 G/DL (ref 12–16)
HEMOGLOBIN: 9.3 G/DL (ref 12–16)
HEMOGLOBIN: 9.5 G/DL (ref 12–16)
MCH RBC QN AUTO: 33.2 PG (ref 26–34)
MCHC RBC AUTO-ENTMCNC: 33.7 G/DL (ref 31–37)
MCV RBC AUTO: 98.7 FL (ref 80–100)
NRBC AUTOMATED: ABNORMAL PER 100 WBC
PDW BLD-RTO: 16.6 % (ref 11.5–14.9)
PLATELET # BLD: 185 K/UL (ref 150–450)
PMV BLD AUTO: 8.7 FL (ref 6–12)
POTASSIUM SERPL-SCNC: 4 MMOL/L (ref 3.7–5.3)
PTH INTACT: 389.9 PG/ML (ref 15–65)
RBC # BLD: 2.56 M/UL (ref 4–5.2)
SODIUM BLD-SCNC: 130 MMOL/L (ref 135–144)
SODIUM BLD-SCNC: 131 MMOL/L (ref 135–144)
SODIUM BLD-SCNC: 133 MMOL/L (ref 135–144)
TIME, STOOL #1: 1130
TIME, STOOL #2: NORMAL
TIME, STOOL #3: NORMAL
WBC # BLD: 4.4 K/UL (ref 3.5–11)

## 2020-08-17 PROCEDURE — 80048 BASIC METABOLIC PNL TOTAL CA: CPT

## 2020-08-17 PROCEDURE — 6360000002 HC RX W HCPCS: Performed by: EMERGENCY MEDICINE

## 2020-08-17 PROCEDURE — 85018 HEMOGLOBIN: CPT

## 2020-08-17 PROCEDURE — 83970 ASSAY OF PARATHORMONE: CPT

## 2020-08-17 PROCEDURE — 6370000000 HC RX 637 (ALT 250 FOR IP): Performed by: INTERNAL MEDICINE

## 2020-08-17 PROCEDURE — G0328 FECAL BLOOD SCRN IMMUNOASSAY: HCPCS

## 2020-08-17 PROCEDURE — 85027 COMPLETE CBC AUTOMATED: CPT

## 2020-08-17 PROCEDURE — 99239 HOSP IP/OBS DSCHRG MGMT >30: CPT | Performed by: INTERNAL MEDICINE

## 2020-08-17 PROCEDURE — 84295 ASSAY OF SERUM SODIUM: CPT

## 2020-08-17 PROCEDURE — 85014 HEMATOCRIT: CPT

## 2020-08-17 PROCEDURE — 36415 COLL VENOUS BLD VENIPUNCTURE: CPT

## 2020-08-17 RX ORDER — FUROSEMIDE 20 MG/1
20 TABLET ORAL DAILY
Status: DISCONTINUED | OUTPATIENT
Start: 2020-08-17 | End: 2020-08-17 | Stop reason: HOSPADM

## 2020-08-17 RX ORDER — IRON POLYSACCHARIDE COMPLEX 150 MG
150 CAPSULE ORAL DAILY
Qty: 60 CAPSULE | Refills: 3 | Status: SHIPPED | OUTPATIENT
Start: 2020-08-17 | End: 2021-03-15 | Stop reason: SDUPTHER

## 2020-08-17 RX ORDER — IRON POLYSACCHARIDE COMPLEX 150 MG
150 CAPSULE ORAL DAILY
Status: DISCONTINUED | OUTPATIENT
Start: 2020-08-17 | End: 2020-08-17 | Stop reason: HOSPADM

## 2020-08-17 RX ADMIN — ALLOPURINOL 100 MG: 100 TABLET ORAL at 08:43

## 2020-08-17 RX ADMIN — Medication 150 MG: at 13:44

## 2020-08-17 RX ADMIN — FUROSEMIDE 20 MG: 20 TABLET ORAL at 12:48

## 2020-08-17 RX ADMIN — ASPIRIN 81 MG: 81 TABLET, COATED ORAL at 08:43

## 2020-08-17 RX ADMIN — ATORVASTATIN CALCIUM 20 MG: 20 TABLET, FILM COATED ORAL at 08:43

## 2020-08-17 RX ADMIN — HEPARIN SODIUM 5000 UNITS: 5000 INJECTION INTRAVENOUS; SUBCUTANEOUS at 05:33

## 2020-08-17 ASSESSMENT — PAIN SCALES - GENERAL: PAINLEVEL_OUTOF10: 0

## 2020-08-17 NOTE — CARE COORDINATION
DISCHARGE PLANNING NOTE:    Plan is for this patient to return to home, no needs. Should discharge later today.      [facility] [facility] - 19% - Has F/u appt scheduled 8/19 @ 2:15 PM    Electronically signed by Mitchell Charlton RN on 8/17/2020 at 12:30 PM

## 2020-08-17 NOTE — PLAN OF CARE
Problem: Falls - Risk of:  Goal: Will remain free from falls  Description: Will remain free from falls  8/17/2020 0402 by Tamera Mane RN  Outcome: Ongoing   Fall precautions in place. No falls this shift. Problem: Musculor/Skeletal Functional Status  Goal: Highest potential functional level  8/17/2020 0402 by Tamera Mane RN  Outcome: Ongoing   Encourage frequent repositioning. Problem: Nutrition  Goal: Optimal nutrition therapy  Outcome: Ongoing   Encourage adequate intake of meals and fluid intake. Problem: Physical Regulation:  Goal: Diagnostic test results will improve  Description: Diagnostic test results will improve  Outcome: Ongoing   Tests performed and labs drawn as ordered. Will continue to monitor. Problem: Respiratory:  Goal: Ability to maintain normal respiratory secretions will improve  Description: Ability to maintain normal respiratory secretions will improve  Outcome: Ongoing   Met    Problem: Skin Integrity:  Goal: Demonstration of wound healing without infection will improve  Description: Demonstration of wound healing without infection will improve  Outcome: Ongoing   Full assessment charted. Skin kept clean and dry. Problem: Bleeding:  Goal: Will show no signs and symptoms of excessive bleeding  Description: Will show no signs and symptoms of excessive bleeding  Outcome: Ongoing   Educate pt on bleeding precautions and use of prophylactics. Problem: Fluid Volume:  Goal: Diagnostic test results will improve  Description: Diagnostic test results will improve  Outcome: Ongoing   I/o tracked each shift. No signs of overload or dehydration.

## 2020-08-17 NOTE — DISCHARGE INSTR - COC
Continuity of Care Form    Patient Name: Marcela Kaye   :  1935  MRN:  [id number]    Admit date:  2020  Discharge date:  ***    Code Status Order: Full Code   Advance Directives:   Advance Care Flowsheet Documentation     Date/Time Healthcare Directive Type of Healthcare Directive Copy in [address] Agent's Name Healthcare Agent's Phone Number    08/15/20 1323  Yes, patient has an advance directive for healthcare treatment  Durable power of  for health care;Living will  Yes, copy in chart  Adult Children -- --    20 2241  No, patient does not have an advance directive for healthcare treatment  --  --  -- -- --          Admitting Physician:  Andrea Rudd MD  PCP: Donavon Nobles MD    Discharging Nurse: [facility] Unit/Room#: 2105/2105-01  Discharging Unit Phone Number: ***    Emergency Contact:   Extended Emergency Contact Information  Primary Emergency Contact: [address] [place] [address] Phone: [phone]  Relation: Child  Secondary Emergency Contact: [address] Phone: [phone]  Relation: Other    Past Surgical History:  Past Surgical History:   Procedure Laterality Date    APPENDECTOMY      BLADDER SUSPENSION      EYE SURGERY      bilat catract removal    HYSTERECTOMY      SHOULDER ARTHROPLASTY Bilateral     TOTAL HIP ARTHROPLASTY Bilateral     TOTAL KNEE ARTHROPLASTY Bilateral        Immunization History:   Immunization History   Administered Date(s) Administered    Influenza Virus Vaccine 2013, 10/02/2014    Influenza, High Dose (Fluzone 65 yrs and older) 10/29/2018    Influenza, Adolfo Scrape, 6-35 Months, IM (Fluzone,Afluria) 09/15/2017    Influenza, Adolfo Scrape, IM, (6 mo and older Fluzone, Flulaval, Fluarix and 3 yrs and older Afluria) 2016    Influenza, Triv, inactivated, subunit, adjuvanted, IM (Fluad 65 yrs and older) 10/10/2019    [facility] MED Delivery:739854609}    Wound Care Documentation and Therapy:        Elimination:  Continence:   · Bowel: {YES / MM:18408}  · Bladder: {YES / RN:80581}  Urinary Catheter: {Urinary Catheter:807388169}   Colostomy/Ileostomy/Ileal Conduit: {YES / XA:29429}       Date of Last BM: ***    Intake/Output Summary (Last 24 hours) at 2020 1323  Last data filed at 2020 0949  Gross per 24 hour   Intake 1714 ml   Output 2800 ml   Net -1086 ml     I/O last 3 completed shifts: In: 2194 [P.O.:720;  I.V.:1474]  Out: 2300 [Urine:2300]    Safety Concerns:     { DRU Safety Concerns:555598583}    Impairments/Disabilities:      {[facility] [place]airments/Disabilities:762296310}    Nutrition Therapy:  Current Nutrition Therapy:   [facility] Diet List:838739002}    Routes of Feeding: {[facility] DME Other Feedings:225425357}  Liquids: {Slp liquid thickness:68648}  Daily Fluid Restriction: {[facility] DME Yes amt example:338961407}  Last Modified Barium Swallow with Video (Video Swallowing Test): {Done Not Done MOUJ:558502925}    Treatments at the Time of Hospital Discharge:   Respiratory Treatments: ***  Oxygen Therapy:  {Therapy; copd oxygen:47825}  Ventilator:    { CC Vent MNQK:239039041}    Rehab Therapies: {THERAPEUTIC INTERVENTION:6699128335}  Weight Bearing Status/Restrictions: [facility] Weight Bearin}  Other Medical Equipment (for information only, NOT a DME order):  {EQUIPMENT:911273642}  Other Treatments: ***    Patient's personal belongings (please select all that are sent with patient):  {[facility] DME Belongings:749354117}    RN SIGNATURE:  {Esignature:529578692}    CASE MANAGEMENT/SOCIAL WORK SECTION    Inpatient Status Date: ***    Readmission Risk Assessment Score:  Readmission Risk              Risk of Unplanned Readmission:        19           Discharging to Facility/ Agency   · Name:   · Address:  · Phone:  · Fax:    Dialysis Facility (if applicable)   · Name:  · Address:  · Dialysis Schedule:  · Phone:  · Fax:    / signature: {Esignature:911605174}    PHYSICIAN SECTION    Prognosis: {Prognosis:5177086387}    Condition at Discharge: Sangeeta Castaneda Patient Condition:664304689}    Rehab Potential (if transferring to Rehab): {Prognosis:1455681200}    Recommended Labs or Other Treatments After Discharge: ***    Physician Certification: I certify the above information and transfer of Artie Preciado  is necessary for the continuing treatment of the diagnosis listed and that she requires {Admit to Appropriate Level of Care:66793} for {GREATER/LESS:140094661} 30 days.      Update Admission H&P: {[facility] DME Changes in FKXWP:579053305}    PHYSICIAN SIGNATURE:  {Esignature:246274656}

## 2020-08-17 NOTE — PROGRESS NOTES
Department of Internal Medicine  Nephrology Joann Funk MD   Progress Note      Interval history: Patient was seen and examined today and she feels well. She does not have shortness of breath, nausea or vomiting. She slept well last night. History of present illness: This is a 81 y.o. female with a significant past medical history of Hyperlipidemia, systemic hypertension, osteoporosis and chronic kidney disease stage III [baseline serum creatinine 1.8 mg/dL but did require acute hemodialysis few years ago for acute kidney injury and follows up with physicians at [facility] [place]], who was sent to the emergency department by the primary care physician after finding of worsening renal function on outpatient laboratory studies. She had been evaluated over the phone for earaches and worsening hearing loss. She denied nausea, vomiting or diarrhea and does not have fever or cough or shortness of breath. Laboratory studies at presentation were remarkable for serum sodium 123 mmol/L, bicarbonate 12 mmol/L with anion gap of 28 and elevated BUN/creatinine 62/3.99 mg/dL and hence nephrology consultation. She does not have any new complaints today. Patient mentions that she is doing much better than when she came in. Does not feel as weak anymore. home medications include potassium chloride and allopurinol as well as losartan 100 mg p.o. daily and furosemide 20 mg p.o. daily.     Physical Exam:    VITALS:  BP (!) 160/82   Pulse 70   Temp 97.7 °F (36.5 °C) (Oral)   Resp 16   Ht 5' (1.524 m)   Wt 146 lb 9.7 oz (66.5 kg)   SpO2 97%   BMI 28.63 kg/m²   24HR INTAKE/OUTPUT:      Intake/Output Summary (Last 24 hours) at 8/17/2020 1105  Last data filed at 8/17/2020 0949  Gross per 24 hour   Intake 1954 ml   Output 2800 ml   Net -846 ml       Constitutional: alert, appears stated age and cooperative    Skin: Skin color, texture, turgor normal. No rashes or lesions    Head: Normocephalic, without obvious Osmolarity:   Lab Results   Component Value Date    OSMOU 274 08/14/2020     Urine Protein:     Lab Results   Component Value Date    TPU 5 08/28/2017     Urine Creatinine:     Lab Results   Component Value Date    LABCREA 32.3 08/28/2017     IMPRESSION/RECOMMENDATIONS:      1. Acute kidney injury superimposed on chronic kidney disease stage 3 - most consistent with prerenal azotemia from loop diuretics as well as angiotensin receptor blocker. Renal ultrasound showed echogenic kidneys suggestive of medical renal disease but no hydronephrosis. Renal function is improved with hydration. Plan: Continue to hold furosemide and losartan. Change IV fluid to 0.9 normal saline at 50 mL/h. Basic metabolic profile daily. 2.  Anion gap metabolic acidosis - likely secondary to uremia. Resolved. 3.  Hyponatremia - Serum osmolality 292 mOsm/kg consistent with iso-osmolar hyponatremia. 4.  Systemic hypertension - . Continue Clonidine 0.2 mg p.o. daily. 5.  Hypokalemia and hypomagnesemia - replace. 6.  Hypocalcemia   Ioniczed calcium low at 0.95  25-hydroxy vitamin D level 46.2  PTH pending     Prognosis is guarded. Brian Madrigal MD PGY2 Family Medicine  Will discuss the plan with the attending  8/17/2020 11:05 AM       Attending note    Patient was seen and examined with medical resident and nursing staff. History and physical examination findings above were confirmed by me. Patient does not have any new complaints. Recommendations: Discontinue IV fluid. Restart furosemide 20 mg p.o. daily and recheck basic metabolic profile in 1 week. Patient to follow-up with me in the office in 2 weeks. No renal objection to discharge today.     Arturo Garcia  Attending Clinical Nephrologist

## 2020-08-17 NOTE — PROGRESS NOTES
[facility]    OCCUPATIONAL THERAPY  TREATMENT NOTE   INPATIENT   Date: 20  Patient Name: Marcela [facility]       Room: 5  MRN: [id number]   Account #: [de-identified]    : 1935  (81 y.o.)  Gender: female        REASON FOR MISSED TREATMENT:   Up to the bathroom Toilet and shower - no concern for self care tasks  -   OT being discontinued at this time.  Patient functioning at Premorbid Level  No further needs        Banner Members, OT

## 2020-08-17 NOTE — DISCHARGE SUMMARY
[place] [address] Internal Medicine    Discharge Summary     Patient ID: Chapito Mcgovern  :  1935   MRN: [id number]     ACCOUNT:  [de-identified]   Patient's PCP: Eldon Chapa MD  Admit Date: 2020   Discharge Date:    Length of Stay: 3  Code Status:  Full Code  Admitting Physician: Leopoldo Oliver MD  Discharge Physician: Leopoldo Oliver MD     Active Discharge Diagnoses:     Primary Problem  <principal problem not specified>      [place] Problems    Diagnosis Date Noted    Significant drop in hemoglobin [facility]) [D58.2] 08/15/2020    Acute kidney injury superimposed on chronic kidney disease ([facility] [N17.9, N18.9] 2020    CKD (chronic kidney disease) stage 3, GFR 30-59 ml/min ([facility] [N18.3]        Admission Condition:  fair     Discharged Condition: fair    Hospital Stay:     Hospital Course:  Chapito Mcgovern is a 81 y.o. female who was admitted for the management of   .     , presented with Abnormal Lab      ,                         <principal problem not specified>;                               Active Problems:    CKD (chronic kidney disease) stage 3, GFR 30-59 ml/min ([facility])    Acute kidney injury superimposed on chronic kidney disease ([address]    Significant drop in hemoglobin ([facility]  Resolved Problems:    * No resolved hospital problems. *       Significant therapeutic interventions: This is a 81 y.o. female with a significant past medical history of Hyperlipidemia, systemic hypertension, osteoporosis and chronic kidney disease stage III [baseline serum creatinine 1.8 mg/dL but did require acute hemodialysis few years ago for acute kidney injury and follows up with physicians at [facility]], who was sent to the emergency department by the primary care physician after finding of worsening renal function on outpatient laboratory studies.   She had been evaluated over the phone for earaches and worsening hearing loss. She denied nausea, vomiting or diarrhea and does not have fever or cough or shortness of breath. Laboratory studies at presentation were remarkable for serum sodium 123 mmol/L, bicarbonate 12 mmol/L with anion gap of 28 and elevated BUN/creatinine 62/3.99 mg/dL and hence nephrology consultation. She does not have any new complaints today. Patient mentions that she is doing much better than when she came in. Does not feel as weak anymore. home medications include potassium chloride and allopurinol as well as losartan 100 mg p.o. daily and furosemide 20 mg p.o. daily.     1. Acute kidney injury superimposed on chronic kidney disease stage 3 - most consistent with prerenal azotemia from loop diuretics as well as angiotensin receptor blocker. Renal ultrasound showed echogenic kidneys suggestive of medical renal disease but no hydronephrosis. Renal function is improved with hydration. Plan: Continue to hold furosemide and losartan. Change IV fluid to 0.9 normal saline at 50 mL/h. Basic metabolic profile daily.     2. Anion gap metabolic acidosis - likely secondary to uremia. Resolved.     3. Hyponatremia - Serum osmolality 292 mOsm/kg consistent with iso-osmolar hyponatremia.     4. Systemic hypertension - . Continue Clonidine 0.2 mg p.o. daily.     5. Hypokalemia and hypomagnesemia - replace.     6. Hypocalcemia   Ioniczed calcium low at 0.95  25-hydroxy vitamin D level 46.2  PTH pending      Significant Diagnostic Studies:   Labs / Micro:       Results for orders placed or performed during the hospital encounter of 08/14/20   Culture, Urine    Specimen: Urine, clean catch   Result Value Ref Range    Specimen Description . CLEAN CATCH URINE     Special Requests NOT REPORTED     Culture NO SIGNIFICANT GROWTH    TOX SCR, BLD, ED   Result Value Ref Range    Acetaminophen Level <5 (L) 10 - 30 ug/mL    Ethanol <10 <10 mg/dL    Ethanol percent <9.023 %    Salicylate Lvl <1 (L) 3 - 10 mg/dL    Toxic k/uL    Absolute Eos # 0.00 0.0 - 0.4 k/uL    Basophils Absolute 0.10 0.0 - 0.2 k/uL    Absolute Immature Granulocyte NOT REPORTED 0.00 - 0.30 k/uL    WBC Morphology NOT REPORTED     RBC Morphology NOT REPORTED     Platelet Estimate NOT REPORTED    Comprehensive Metabolic Panel   Result Value Ref Range    Glucose 61 (L) 70 - 99 mg/dL    BUN 62 (H) 8 - 23 mg/dL    CREATININE 3.92 (H) 0.50 - 0.90 mg/dL    Bun/Cre Ratio NOT REPORTED 9 - 20    Calcium 8.6 8.6 - 10.4 mg/dL    Sodium 121 (L) 135 - 144 mmol/L    Potassium 4.7 3.7 - 5.3 mmol/L    Chloride 82 (L) 98 - 107 mmol/L    CO2 13 (L) 20 - 31 mmol/L    Anion Gap 26 (H) 9 - 17 mmol/L    Alkaline Phosphatase 104 35 - 104 U/L    ALT 12 5 - 33 U/L    AST 44 (H) <32 U/L    Total Bilirubin 0.83 0.3 - 1.2 mg/dL    Total Protein 6.8 6.4 - 8.3 g/dL    Alb 4.3 3.5 - 5.2 g/dL    Albumin/Globulin Ratio NOT REPORTED 1.0 - 2.5    GFR Non-African American 11 (L) >60 mL/min    GFR  13 (L) >60 mL/min    GFR Comment          GFR Staging NOT REPORTED    Magnesium   Result Value Ref Range    Magnesium 1.8 1.6 - 2.6 mg/dL   Calcium, Ionized   Result Value Ref Range    Calcium, Ion 1.08 (L) 1.13 - 1.33 mmol/L   PHOSPHORUS   Result Value Ref Range    Phosphorus 3.6 2.6 - 4.5 mg/dL   Protime-INR   Result Value Ref Range    Protime 13.0 11.8 - 14.6 sec    INR 1.0    APTT   Result Value Ref Range    PTT 28.9 24.0 - 36.0 sec   Lactic Acid, Plasma   Result Value Ref Range    Lactic Acid 1.6 0.5 - 2.2 mmol/L    Lactic Acid, Whole Blood NOT REPORTED 0.7 - 2.1 mmol/L   Urinalysis, reflex to microscopic   Result Value Ref Range    Color, UA YELLOW YELLOW    Turbidity UA CLOUDY (A) CLEAR    Glucose, Ur NEGATIVE NEGATIVE    Bilirubin Urine (A) NEGATIVE     Presumptive positive. Unable to confirm due to unavailability of reagent.     Ketones, Urine TRACE (A) NEGATIVE    Specific [place], UA 1.011 1.000 - 1.030    Urine Hgb NEGATIVE NEGATIVE    pH, UA 5.5 5.0 - 8.0    Protein, UA NEGATIVE NEGATIVE    Urobilinogen, Urine Normal Normal    Nitrite, Urine NEGATIVE NEGATIVE    Leukocyte Esterase, Urine LARGE (A) NEGATIVE    Urinalysis Comments NOT REPORTED    OSMOLALITY, URINE   Result Value Ref Range    Osmolality, Ur 274 80 - 1300 mOsm/kg   Osmolality   Result Value Ref Range    Serum Osmolality 282 275 - 295 mOsm/kg   Lipase   Result Value Ref Range    Lipase 43 13 - 60 U/L   Troponin   Result Value Ref Range    Troponin, High Sensitivity 51 (H) 0 - 14 ng/L    Troponin T NOT REPORTED <0.03 ng/mL    Troponin Interp NOT REPORTED    Drug screen, tricyclic   Result Value Ref Range    Tricyclic Antidep,Urine NEGATIVE NEGATIVE   Troponin   Result Value Ref Range    Troponin, High Sensitivity 47 (H) 0 - 14 ng/L    Troponin T NOT REPORTED <0.03 ng/mL    Troponin Interp NOT REPORTED    Microscopic Urinalysis   Result Value Ref Range    -          WBC, UA 20 TO 50 /HPF    RBC, UA 0 TO 2 /HPF    Casts UA NOT REPORTED /LPF    Crystals, UA NOT REPORTED None /HPF    Epithelial Cells UA 2 TO 5 /HPF    Renal Epithelial, UA NOT REPORTED 0 /HPF    Bacteria, UA MODERATE (A) None    Mucus, UA NOT REPORTED None    Trichomonas, UA NOT REPORTED None    Amorphous, UA 1+ (A) None    Other Observations UA NOT REPORTED NOT REQ. Yeast, UA NOT REPORTED None   COVID-19    Specimen: Other   Result Value Ref Range    SARS-CoV-2          SARS-CoV-2, Rapid Not Detected Not Detected    Source . NASOPHARYNGEAL SWAB     SARS-CoV-2, PCR         Basic Metabolic Panel w/ Reflex to MG   Result Value Ref Range    Glucose 82 70 - 99 mg/dL    BUN 58 (H) 8 - 23 mg/dL    CREATININE 3.49 (H) 0.50 - 0.90 mg/dL    Bun/Cre Ratio NOT REPORTED 9 - 20    Calcium 7.3 (L) 8.6 - 10.4 mg/dL    Sodium 128 (L) 135 - 144 mmol/L    Potassium 3.8 3.7 - 5.3 mmol/L    Chloride 93 (L) 98 - 107 mmol/L    CO2 18 (L) 20 - 31 mmol/L    Anion Gap 17 9 - 17 mmol/L    GFR Non-African American 13 (L) >60 mL/min    GFR African American 15 (L) >60 mL/min    GFR Comment          GFR Staging NOT REPORTED    Osmolality   Result Value Ref Range    Serum Osmolality 292 275 - 295 mOsm/kg   CBC Auto Differential   Result Value Ref Range    WBC 6.0 3.5 - 11.0 k/uL    RBC 2.54 (L) 4.0 - 5.2 m/uL    Hemoglobin 8.5 (L) 12.0 - 16.0 g/dL    Hematocrit 24.6 (L) 36 - 46 %    MCV 96.7 80 - 100 fL    MCH 33.3 26 - 34 pg    MCHC 34.4 31 - 37 g/dL    RDW 16.7 (H) 11.5 - 14.9 %    Platelets 554 070 - 543 k/uL    MPV 9.0 6.0 - 12.0 fL    NRBC Automated NOT REPORTED per 100 WBC    Differential Type NOT REPORTED     Seg Neutrophils 60 36 - 66 %    Lymphocytes 27 24 - 44 %    Monocytes 11 (H) 1 - 7 %    Eosinophils % 1 0 - 4 %    Basophils 1 0 - 2 %    Immature Granulocytes NOT REPORTED 0 %    Segs Absolute 3.70 1.3 - 9.1 k/uL    Absolute Lymph # 1.60 1.0 - 4.8 k/uL    Absolute Mono # 0.60 0.1 - 1.3 k/uL    Absolute Eos # 0.00 0.0 - 0.4 k/uL    Basophils Absolute 0.00 0.0 - 0.2 k/uL    Absolute Immature Granulocyte NOT REPORTED 0.00 - 0.30 k/uL    WBC Morphology NOT REPORTED     RBC Morphology NOT REPORTED     Platelet Estimate NOT REPORTED    SODIUM, URINE, RANDOM   Result Value Ref Range    Sodium,Ur 27 mmol/L   CHLORIDE, URINE, RANDOM   Result Value Ref Range    Chloride, Ur <20 mmol/L   URINALYSIS WITH MICROSCOPIC   Result Value Ref Range    Color, UA YELLOW YELLOW    Turbidity UA CLEAR CLEAR    Glucose, Ur NEGATIVE NEGATIVE    Bilirubin Urine NEGATIVE NEGATIVE    Ketones, Urine NEGATIVE NEGATIVE    Specific Gravity, UA 1.007 1.000 - 1.030    Urine Hgb NEGATIVE NEGATIVE    pH, UA 7.0 5.0 - 8.0    Protein, UA NEGATIVE NEGATIVE    Urobilinogen, Urine Normal Normal    Nitrite, Urine NEGATIVE NEGATIVE    Leukocyte Esterase, Urine SMALL (A) NEGATIVE    Urinalysis Comments NOT REPORTED     -          WBC, UA 2 TO 5 /HPF    RBC, UA None /HPF    Casts UA NOT REPORTED /LPF    Crystals, UA NOT REPORTED None /HPF    Epithelial Cells UA NOT REPORTED /HPF    Renal Epithelial, UA NOT REPORTED 0 /HPF 12.0 fL    NRBC Automated NOT REPORTED per 100 WBC   Basic Metabolic Panel w/ Reflex to MG   Result Value Ref Range    Glucose 114 (H) 70 - 99 mg/dL    BUN 39 (H) 8 - 23 mg/dL    CREATININE 2.38 (H) 0.50 - 0.90 mg/dL    Bun/Cre Ratio NOT REPORTED 9 - 20    Calcium 6.9 (L) 8.6 - 10.4 mg/dL    Sodium 130 (L) 135 - 144 mmol/L    Potassium 3.2 (L) 3.7 - 5.3 mmol/L    Chloride 95 (L) 98 - 107 mmol/L    CO2 26 20 - 31 mmol/L    Anion Gap 9 9 - 17 mmol/L    GFR Non-African American 19 (L) >60 mL/min    GFR  24 (L) >60 mL/min    GFR Comment          GFR Staging NOT REPORTED    NA (Sodium)   Result Value Ref Range    Sodium 130 (L) 135 - 144 mmol/L   Hgb/Hct   Result Value Ref Range    Hemoglobin 8.9 (L) 12.0 - 16.0 g/dL    Hematocrit 26.0 (L) 36 - 46 %   NA (Sodium)   Result Value Ref Range    Sodium 130 (L) 135 - 144 mmol/L   Hgb/Hct   Result Value Ref Range    Hemoglobin 8.8 (L) 12.0 - 16.0 g/dL    Hematocrit 26.8 (L) 36 - 46 %   Magnesium   Result Value Ref Range    Magnesium 1.5 (L) 1.6 - 2.6 mg/dL   NA (Sodium)   Result Value Ref Range    Sodium 130 (L) 135 - 144 mmol/L   Hgb/Hct   Result Value Ref Range    Hemoglobin 9.5 (L) 12.0 - 16.0 g/dL    Hematocrit 28.4 (L) 36 - 46 %   Calcium, Ionized   Result Value Ref Range    Calcium, Ion 0.95 (L) 1.13 - 1.33 mmol/L   NA (Sodium)   Result Value Ref Range    Sodium 133 (L) 135 - 144 mmol/L   Hgb/Hct   Result Value Ref Range    Hemoglobin 9.5 (L) 12.0 - 16.0 g/dL    Hematocrit 27.9 (L) 36 - 46 %   CBC   Result Value Ref Range    WBC 4.4 3.5 - 11.0 k/uL    RBC 2.56 (L) 4.0 - 5.2 m/uL    Hemoglobin 8.5 (L) 12.0 - 16.0 g/dL    Hematocrit 25.2 (L) 36 - 46 %    MCV 98.7 80 - 100 fL    MCH 33.2 26 - 34 pg    MCHC 33.7 31 - 37 g/dL    RDW 16.6 (H) 11.5 - 14.9 %    Platelets 242 777 - 785 k/uL    MPV 8.7 6.0 - 12.0 fL    NRBC Automated NOT REPORTED per 100 WBC   Basic Metabolic Panel w/ Reflex to MG   Result Value Ref Range    Glucose 94 70 - 99 mg/dL    BUN 30 (H) 8 - 23 mg/dL    CREATININE 1.59 (H) 0.50 - 0.90 mg/dL    Bun/Cre Ratio NOT REPORTED 9 - 20    Calcium 7.5 (L) 8.6 - 10.4 mg/dL    Sodium 131 (L) 135 - 144 mmol/L    Potassium 4.0 3.7 - 5.3 mmol/L    Chloride 97 (L) 98 - 107 mmol/L    CO2 27 20 - 31 mmol/L    Anion Gap 7 (L) 9 - 17 mmol/L    GFR Non-African American 31 (L) >60 mL/min    GFR  38 (L) >60 mL/min    GFR Comment          GFR Staging NOT REPORTED    NA (Sodium)   Result Value Ref Range    Sodium 130 (L) 135 - 144 mmol/L   Hgb/Hct   Result Value Ref Range    Hemoglobin 9.3 (L) 12.0 - 16.0 g/dL    Hematocrit 28.2 (L) 36 - 46 %   POC Glucose Fingerstick   Result Value Ref Range    POC Glucose 63 (L) 65 - 105 mg/dL   POC Glucose Fingerstick   Result Value Ref Range    POC Glucose 53 (L) 65 - 105 mg/dL   POC Glucose Fingerstick   Result Value Ref Range    POC Glucose 113 (H) 65 - 105 mg/dL   POC Glucose Fingerstick   Result Value Ref Range    POC Glucose 89 65 - 105 mg/dL   POC Glucose Fingerstick   Result Value Ref Range    POC Glucose 114 (H) 65 - 105 mg/dL   POC Glucose Fingerstick   Result Value Ref Range    POC Glucose 131 (H) 65 - 105 mg/dL   POC Glucose Fingerstick   Result Value Ref Range    POC Glucose 151 (H) 65 - 105 mg/dL   POC Glucose Fingerstick   Result Value Ref Range    POC Glucose 105 65 - 105 mg/dL   POC Glucose Fingerstick   Result Value Ref Range    POC Glucose 117 (H) 65 - 105 mg/dL   EKG 12 Lead   Result Value Ref Range    Ventricular Rate 88 BPM    Atrial Rate 88 BPM    P-R Interval 212 ms    QRS Duration 88 ms    Q-T Interval 358 ms    QTc Calculation (Bazett) 433 ms    P Axis 27 degrees    R Axis -43 degrees    T Axis 60 degrees        {Radiology:    Ct Head Wo Contrast    Result Date: 8/14/2020  EXAMINATION: CT OF THE HEAD WITHOUT CONTRAST  8/14/2020 4:59 pm TECHNIQUE: CT of the head was performed without the administration of intravenous contrast. Dose modulation, iterative reconstruction, and/or weight based adjustment fluid.  Lungs are otherwise clear. Moderate-sized hiatal hernia. Us Retroperitoneal Complete    Result Date: 8/15/2020  EXAMINATION: RETROPERITONEAL ULTRASOUND OF THE KIDNEYS 8/15/2020 COMPARISON: None HISTORY: ORDERING SYSTEM PROVIDED HISTORY: Acute kidney injury TECHNOLOGIST PROVIDED HISTORY: Acute kidney injury FINDINGS: Kidneys: Suboptimal evaluation. Some renal tissue is obscured. The right kidney measures 8.1 cm in length and the left kidney measures 8.2 cm in length. Kidneys are echogenic suggestive of medical renal disease. No contour deforming mass, shadowing stone or hydronephrosis. Small cyst involving the right kidney measuring 1.5 cm. Echogenic kidneys suggestive of medical renal disease. No acute process noted. Consultations:    Consults:     Final Specialist Recommendations/Findings:   IP CONSULT TO NEPHROLOGY  IP CONSULT TO INTERNAL MEDICINE      The patient was seen and examined on day of discharge and this discharge summary is in conjunction with any daily progress note from day of discharge. Discharge plan:     Disposition: Home    Physician Follow Up: With PCP and as specified     Requiring Further Evaluation/Follow Up POST HOSPITALIZATION/Incidental Findings:    Diet: regular     Activity: As tolerated    I  Discharge Medications:      Medication List      START taking these medications    iron polysaccharides 150 MG capsule  Commonly known as:  NIFEREX  Take 1 capsule by mouth daily        CHANGE how you take these medications    cloNIDine 0.2 MG tablet  Commonly known as:  CATAPRES  Take 1 tablet by mouth daily  What changed:  when to take this     [address] 50+ PO  What changed:  Another medication with the same name was removed. Continue taking this medication, and follow the directions you see here.         CONTINUE taking these medications    allopurinol 100 MG tablet  Commonly known as:  ZYLOPRIM     aspirin 81 MG tablet  Take 1 tablet by mouth daily atorvastatin 20 MG tablet  Commonly known as:  Lipitor  Take 1 tablet by mouth daily     furosemide 20 MG tablet  Commonly known as:  LASIX  Take 1 tablet by mouth daily     neomycin-polymyxin-hydrocortisone 3.5-49516-8 otic suspension  Commonly known as:  CORTISPORIN     omeprazole 20 MG delayed release capsule  Commonly known as:  PRILOSEC        STOP taking these medications    hydrALAZINE 25 MG tablet  Commonly known as:  APRESOLINE     losartan 100 MG tablet  Commonly known as:  Cozaar           Where to Get Your Medications      These medications were sent to [address] [place] [address]    Phone:  [phone]   · iron polysaccharides 150 MG capsule           Time spent on discharge planning ;          [] less than 30 minutes . [x]   more  than 30 minutes . Ellectronically signed by   Stephanie Bush MD      Thank you Dr. Elton Duong MD for the opportunity to be involved in this patient's care. Please note that this chart was generated using voice recognition Dragon dictation software. Although every effort was made to ensure the accuracy of this automated transcription, some errors in transcription may have occurred.

## 2020-08-18 ENCOUNTER — TELEPHONE (OUTPATIENT)
Dept: FAMILY MEDICINE CLINIC | Age: 85
End: 2020-08-18

## 2020-08-18 ENCOUNTER — CARE COORDINATION (OUTPATIENT)
Dept: CASE MANAGEMENT | Age: 85
End: 2020-08-18

## 2020-08-18 PROCEDURE — 1111F DSCHRG MED/CURRENT MED MERGE: CPT

## 2020-08-18 NOTE — TELEPHONE ENCOUNTER
[place] [facility] Trans[facility] Initial Follow Up Call    Outreach made within 2 business days of discharge: Yes    Patient: Sheryl Gibson Patient : 1935   MRN: [id number]  Reason for Admission: There are no discharge diagnoses documented for the most recent discharge. Discharge Date: 20       Spoke with:     Discharge department/facility:     TCM Interactive Patient Contact:  Was patient able to fill all prescriptions:   Was patient instructed to bring all medications to the follow-up visit:   Is patient taking all medications as directed in the discharge summary?    Does patient understand their discharge instructions:   Does patient have questions or concerns that need addressed prior to 7-14 day follow up office visit:     Scheduled appointment with PCP within 7-14 days    Follow Up  Future Appointments   Date Time Provider Eric Vizcaino   2020  2:15 PM ELIZABET Mills - CNP [id number]   2020  2:00 PM Estuardo Levine MD fp lilo Limon MA LM for pt to call the office to schedule appt

## 2020-08-18 NOTE — PROGRESS NOTES
cerumenex     [facility] PHYSICIANS  [facility] FAMILY PHYSICIANS  NESHA Servin [facility] [address].  [address] [facility] [address]  Dept: [phone]     2020   Chief Complaint   Patient presents with    Otalgia     right ear pain and hearing loss - states it is better     Follow-Up from [facility] (:  1935) is a 81 y.o. female is an established patient of Dr. Roe Simons. Patient has a history of acute. CKD stage III, significant drop in hemoglobin, chronic gout, secondary hyperparathyroidism, impacted cerumen on the left ear. [address] [place] [address] has a well controlled hypertension. she is currently on Clonidine. Patient's most recent BP in the office was stable. she reports stable BP readings at home. Patient denies any adverse reaction to this therapy. she denies any CP, SOB, HA, or palpitations. Cardiovascular risk factors: advanced age (older than 54 for men, 72 for women) and microalbuminuria. Use of agents associated with hypertension: lasix. History of target organ damage: chronic kidney disease. Patient was recently admitted for pre renal azotemia. She's been on lasix for awhile now. She's been known to have CKD stage 3. Creatinine levels improved from 3.99 to 1.59. Patient's son is with her in the office. Son stated that they are waiting for the nephrologist to call and set up an appt. They are encouraged to call and make the appointment. Referral will be sent per Dr. Roe Simons. CREATININE   Date Value Ref Range Status   2020 1.59 (H) 0.50 - 0.90 mg/dL Final   2020 2.38 (H) 0.50 - 0.90 mg/dL Final   08/15/2020 3.49 (H) 0.50 - 0.90 mg/dL Final   2020 3.92 (H) 0.50 - 0.90 mg/dL Final   2020 3.99 (H) 0.50 - 0.90 mg/dL Final   10/23/2019 1.51 (H) 0.50 - 0.90 mg/dL Final   10/17/2019 1.34 (H) 0.50 - 0.90 mg/dL Final     She was also found with significant H/H drop.  She was started on Niferex capsule. Her hemoglobin improved from 8.5 to 9.3 lpm.   Hemoglobin   Date Value Ref Range Status   08/17/2020 9.3 (L) 12.0 - 16.0 g/dL Final   08/17/2020 8.5 (L) 12.0 - 16.0 g/dL Final   08/17/2020 9.5 (L) 12.0 - 16.0 g/dL Final   08/16/2020 9.5 (L) 12.0 - 16.0 g/dL Final   08/16/2020 8.8 (L) 12.0 - 16.0 g/dL Final   08/16/2020 8.9 (L) 12.0 - 16.0 g/dL Final   08/16/2020 8.5 (L) 12.0 - 16.0 g/dL Final   08/16/2020 8.6 (L) 12.0 - 16.0 g/dL Final   08/15/2020 8.8 (L) 12.0 - 16.0 g/dL Final   08/15/2020 9.6 (L) 12.0 - 16.0 g/dL Final   08/15/2020 8.5 (L) 12.0 - 16.0 g/dL Final   08/14/2020 11.2 (L) 12.0 - 16.0 g/dL Final   08/14/2020 11.7 (L) 12.0 - 16.0 g/dL Final   10/14/2019 11.3 (L) 12.0 - 16.0 g/dL Final     Patient's uric acid remains elevated. She is on allopurinol. Uric Acid   Date Value Ref Range Status   08/15/2020 6.0 (H) 2.4 - 5.7 mg/dL Final   08/14/2020 5.8 (H) 2.4 - 5.7 mg/dL Final   10/14/2019 7.5 (H) 2.4 - 5.7 mg/dL Final     Patient with secondary hyperparathyroidism. Increased from PTH in 2017;  Pth Intact   Date Value Ref Range Status   08/17/2020 389.9 (H) 15.0 - 65.0 pg/mL Final     Comment:     SAMPLES FROM PATIENTS ROUTINELY RECEIVING HIGH DOSE BIOTIN THERAPY MAY SHOW FALSELY   DEPRESSED RESULTS. ADDITIONAL INFORMATION MAY BE REQUIRED FOR DIAGNOSIS.     08/28/2017 39.76 15.0 - 65.0 pg/mL Final     Comment:     SAMPLES FROM PATIENTS ROUTINELY RECEIVING HIGH DOSE BIOTIN THERAPY MAY SHOW   FALSELY DEPRESSED RESULTS. ADDITIONAL INFORMATION MAY BE REQUIRED FOR   DIAGNOSIS.          No data recorded   Counseling given: Not Answered    Patient Active Problem List   Diagnosis    Osteoporosis    Idiopathic chronic gout of left foot without tophus    Hyperlipidemia    Hiatal hernia    Gastroesophageal reflux disease without esophagitis    Gout    Benign hypertension with CKD (chronic kidney disease) stage III (HCC)    CKD (chronic kidney disease) stage 3, GFR 30-59 ml/min (HCC)    Acute ear pain, right    Hyperglycemia    Anemia    Acute kidney injury superimposed on chronic kidney disease (HCC)    Significant drop in hemoglobin (HCC)    Acute renal failure superimposed on chronic kidney disease (HCC)    Impacted cerumen of right ear    Secondary hyperparathyroidism ([address]    Peripheral edema      Past Medical History:   Diagnosis Date    CKD (chronic kidney disease) stage 3, GFR 30-59 ml/min ([facility])     Depression     GERD (gastroesophageal reflux disease)     Gout     Hyperlipidemia 3/22/2016    Hypertension     Hyponatremia     Metabolic acidosis     Osteoporosis     Secondary hyperparathyroidism ([address] 8/19/2020      Past Surgical History:   Procedure Laterality Date    APPENDECTOMY      BLADDER SUSPENSION      EYE SURGERY      bilat catract removal    HYSTERECTOMY      SHOULDER ARTHROPLASTY Bilateral     TOTAL HIP ARTHROPLASTY Bilateral     TOTAL KNEE ARTHROPLASTY Bilateral      Family History   Problem Relation Age of Onset    Cancer Mother         breast cancer    Emphysema Mother     Cancer Father         Prostate cancer    Heart Disease Maternal Grandfather     Heart Disease Paternal Grandfather      Current Outpatient Medications   Medication Sig Dispense Refill    carbamide peroxide (DEBROX) 6.5 % otic solution Place 5 drops in ear(s) 2 times daily for 4 days 1 Bottle 0    iron polysaccharides (NIFEREX) 150 MG capsule Take 1 capsule by mouth daily 60 capsule 3    omeprazole (PRILOSEC) 20 MG delayed release capsule Take 20 mg by mouth daily      neomycin-polymyxin-hydrocortisone (CORTISPORIN) 3.5-33059-2 otic suspension Place 3 drops into the right ear 4 times daily For 10 days; Started 8/13/2020      allopurinol (ZYLOPRIM) 100 MG tablet Take 100 mg by mouth daily       furosemide (LASIX) 20 MG tablet Take 1 tablet by mouth daily 180 tablet 1    cloNIDine (CATAPRES) 0.2 MG tablet Take 1 tablet by mouth daily (Patient taking differently: Take 0.2 mg by mouth nightly ) 180 tablet 1    atorvastatin (LIPITOR) 20 MG tablet Take 1 tablet by mouth daily 90 tablet 1    aspirin 81 MG tablet Take 1 tablet by mouth daily 90 tablet 1    Multiple Vitamins-Minerals (OCUVITE ADULT 50+ PO) Take 1 tablet by mouth daily       No current facility-administered medications for this visit. Review of Systems   Constitutional: Positive for fatigue. Negative for chills and fever. HENT: Negative. Respiratory: Negative. Negative for cough and shortness of breath. Cardiovascular: Positive for leg swelling. Negative for chest pain and palpitations. Gastrointestinal: Negative for abdominal pain. Early satiety   Genitourinary: Negative for dysuria, flank pain, frequency, hematuria, pelvic pain and urgency. Musculoskeletal: Negative for arthralgias and myalgias. Skin: Negative for rash. Neurological: Negative for light-headedness and headaches. Psychiatric/Behavioral: Negative for sleep disturbance. The patient is nervous/anxious. Prior to Visit Medications    Medication Sig Taking?  Authorizing Provider   carbamide peroxide (DEBROX) 6.5 % otic solution Place 5 drops in ear(s) 2 times daily for 4 days Yes ELIZABET Mills - CNP   iron polysaccharides (NIFEREX) 150 MG capsule Take 1 capsule by mouth daily Yes Harrison Galvin MD   omeprazole (PRILOSEC) 20 MG delayed release capsule Take 20 mg by mouth daily Yes Historical Provider, MD   neomycin-polymyxin-hydrocortisone (CORTISPORIN) 3.5-83015-6 otic suspension Place 3 drops into the right ear 4 times daily For 10 days; Started 8/13/2020 Yes Historical Provider, MD   allopurinol (ZYLOPRIM) 100 MG tablet Take 100 mg by mouth daily  Yes Historical Provider, MD   furosemide (LASIX) 20 MG tablet Take 1 tablet by mouth daily Yes Bam Khan MD   cloNIDine (CATAPRES) 0.2 MG tablet Take 1 tablet by mouth daily  Patient taking differently: Take 0.2 mg by mouth nightly  Yes Bam Khan MD   atorvastatin (LIPITOR) 20 MG tablet Take 1 tablet by mouth daily Yes Luis Lewis MD   aspirin 81 MG tablet Take 1 tablet by mouth daily Yes Luis Lewis MD   Multiple Vitamins-Minerals (OCUVITE ADULT 50+ PO) Take 1 tablet by mouth daily Yes Historical Provider, MD      Social History     Tobacco Use    Smoking status: Never Smoker    Smokeless tobacco: Never Used   Substance Use Topics    Alcohol use: Yes     Alcohol/week: 2.0 standard drinks     Types: 2 Standard drinks or equivalent per week     Comment: occasionally      Vitals:    20 1401   BP: 130/80   Pulse: 81   Temp: 97.7 °F (36.5 °C)   SpO2: 98%   Weight: 146 lb (66.2 kg)   Height: 5' (1.524 m)     Estimated body mass index is 28.51 kg/m² as calculated from the following:    Height as of this encounter: 5' (1.524 m). Weight as of this encounter: 146 lb (66.2 kg). Physical Exam  Vitals signs and nursing note reviewed. Constitutional:       Appearance: She is well-developed. HENT:      Head: Normocephalic. Right Ear: Tympanic membrane and external ear normal.      Left Ear: Tympanic membrane and external ear normal. Decreased hearing noted. There is impacted cerumen. Nose: Nose normal.      Mouth/Throat:      Mouth: Mucous membranes are moist.   Eyes:      Pupils: Pupils are equal, round, and reactive to light. Neck:      Musculoskeletal: Normal range of motion and neck supple. Thyroid: No thyromegaly. Vascular: No JVD. Cardiovascular:      Rate and Rhythm: Normal rate and regular rhythm. Pulses: Normal pulses. Heart sounds: Normal heart sounds. No murmur. Pulmonary:      Effort: Pulmonary effort is normal. No respiratory distress. Breath sounds: Normal breath sounds. Abdominal:      General: Bowel sounds are normal. There is no distension. Palpations: Abdomen is soft. Tenderness: There is no abdominal tenderness. Musculoskeletal: Normal range of motion.       Right lower le+ Edema present. Left lower le+ Edema present. Lymphadenopathy:      Cervical: No cervical adenopathy. Skin:     General: Skin is warm and dry. Capillary Refill: Capillary refill takes less than 2 seconds. Neurological:      Mental Status: She is alert and oriented to person, place, and time. Psychiatric:         Mood and Affect: Mood is anxious. Speech: Speech is delayed. Behavior: Behavior normal.       Most recent labs reviewed with patient, and all questions answered.   Lab Results   Component Value Date    WBC 4.4 2020    HGB 9.3 (L) 2020    HCT 28.2 (L) 2020    MCV 98.7 2020     2020     Lab Results   Component Value Date     2020    K 4.0 2020    CL 97 2020    CO2 27 2020    BUN 30 2020    CREATININE 1.59 2020    GLUCOSE 94 2020    GLUCOSE 88 2012    CALCIUM 7.5 2020      Lab Results   Component Value Date    ALT 12 2020    AST 44 (H) 2020    ALKPHOS 104 2020    BILITOT 0.83 2020     Lab Results   Component Value Date    TSH 4.17 2020     Lab Results   Component Value Date    CHOL 179 10/14/2019    CHOL 138 2018    CHOL 147 2017     Lab Results   Component Value Date    TRIG 73 10/14/2019    TRIG 65 2018    TRIG 82 2017     Lab Results   Component Value Date     10/14/2019    HDL 64 2018     2017     Lab Results   Component Value Date    LDLCHOLESTEROL 10 10/14/2019    LDLCHOLESTEROL 61 2018    LDLCHOLESTEROL 24 2017     Lab Results   Component Value Date    CHOLHDLRATIO 1.2 10/14/2019    CHOLHDLRATIO 2.2 2018    CHOLHDLRATIO 1.4 2017     Lab Results   Component Value Date    LABA1C 4.9 2020      Lab Results   Component Value Date    MDLQCOMK43 449 2020     Lab Results   Component Value Date    FOLATE 15.3 2020     Lab Results   Component Value Date    VITD25 46.2 08/14/2020     ASSESSMENT/PLAN:  1. Acute renal failure superimposed on stage 3 chronic kidney disease, unspecified acute renal failure type (HCC)  Failure to Improve  ENcouraged to drink fluids  Follow up with Dr. Jeferson Garcia check in 1 week  - [address] MED LIST  - [facility]) - Riccardo Monk MD, Nephrology, [place]    2. Benign hypertension with CKD (chronic kidney disease) stage III (HCC)  Stable  Continue current therapy. DISCUSSED AND ADVISED TO:  Cut down on your salt intake. Cut down on caffeinated drinks, sports drinks. Instructed to check BP at home regularly. Report for any chest pains, shortness of breath, headaches, and lightheadedness. Call the office if your blood pressure continue to be higher than 140/90 or 90/50.    - IN DISCHARGE MEDS RECONCILED W/ CURRENT OUTPATIENT MED LIST  - [facility]) - Riccardo Monk MD, Nephrology, [place]    3. Significant drop in hemoglobin (HCC)  Improving  COntinue iron supplements  Repeat h/h  Report blood in urine and stool.    - IN DISCHARGE MEDS RECONCILED W/ CURRENT OUTPATIENT MED LIST    4. Secondary hyperparathyroidism ([facility] [address]  Ongoing  Nephology follow up  - [facility]) - Riccardo Monk MD, Nephrology, [place]  5. Chronic gout due to renal impairment without tophus, unspecified site  Ongoing  Nephology follow up  - [facility]) - Riccardo Monk MD, Nephrology, [place]  6. Impacted cerumen of right ear  Failure to Improve  Declined ear flushing  Use ear drops  Re evaluate  - carbamide peroxide (DEBROX) 6.5 % otic solution; Place 5 drops in ear(s) 2 times daily for 4 days  Dispense: 1 Bottle; Refill: 0    7. Peripheral edema  Failure to Improve  Compression stockings  Re evaluate. Controlled Substance Monitoring:  Acute and Chronic Pain Monitoring:   No flowsheet data found.   Orders Placed This Encounter   Procedures    [facility]) - Riccardo Monk MD, Nephrology, [place]     Referral Priority:   Routine     Referral Type:   Eval and Treat     Referral Reason:   Specialty Services Required     Referred to Provider:   Tarsha Nelson MD     Requested Specialty:   Nephrology     Number of Visits Requested:   1    IN DISCHARGE MEDS RECONCILED W/ CURRENT OUTPATIENT MED LIST     Orders Placed This Encounter   Medications    carbamide peroxide (DEBROX) 6.5 % otic solution     Sig: Place 5 drops in ear(s) 2 times daily for 4 days     Dispense:  1 Bottle     Refill:  0      There are no discontinued medications. Health Maintenance Due   Topic Date Due    DTaP/Tdap/Td vaccine (1 - Tdap) 11/29/1954    Shingles Vaccine (1 of 2) 11/29/1986    Annual Wellness Visit (AWV)  06/20/2019      Return in 3 months (on 11/19/2020) for Chronic conditions. Rita Prieto received counseling on the following healthy behaviors: nutrition, exercise and medication adherence  Reviewed prior labs and health maintenance  Continue current medications, diet and exercise. Discussed use, benefit, and side effects of prescribed medications. Barriers to medication compliance addressed. Patient given educational materials - see patient instructions  Was a self-tracking handout given in paper form or via [employer]? Yes    Requested Prescriptions     Signed Prescriptions Disp Refills    carbamide peroxide (DEBROX) 6.5 % otic solution 1 Bottle 0     Sig: Place 5 drops in ear(s) 2 times daily for 4 days       All patient questions answered. Patient voiced understanding. Quality Measures    Body mass index is 28.51 kg/m². Low. Weight control planned discussed Healthy diet and regular exercise. BP: 130/80. Blood pressure is normal. Treatment plan consists of No treatment change needed.     Fall Risk 8/11/2020 10/29/2018 8/29/2017 5/26/2017 3/22/2016 2/27/2015 10/2/2014   2 or more falls in past year? no no yes no no no no   Fall with injury in past year? no no no yes yes no no     The patient does not have a history of

## 2020-08-18 NOTE — CARE COORDINATION
Transitions Interventions         Follow Up  Future Appointments   Date Time Provider Eric Vizcaino   8/19/2020  2:15 PM ELIZABET Mills - CNP fp [facility]   12/4/2020  2:00 PM Lamont Sheppard MD fp sc Ronald Corado RN

## 2020-08-19 ENCOUNTER — OFFICE VISIT (OUTPATIENT)
Dept: FAMILY MEDICINE CLINIC | Age: 85
End: 2020-08-19
Payer: MEDICARE

## 2020-08-19 VITALS
HEART RATE: 81 BPM | WEIGHT: 146 LBS | OXYGEN SATURATION: 98 % | BODY MASS INDEX: 28.66 KG/M2 | HEIGHT: 60 IN | DIASTOLIC BLOOD PRESSURE: 80 MMHG | TEMPERATURE: 97.7 F | SYSTOLIC BLOOD PRESSURE: 130 MMHG

## 2020-08-19 PROBLEM — H61.21 IMPACTED CERUMEN OF RIGHT EAR: Status: ACTIVE | Noted: 2020-08-19

## 2020-08-19 PROBLEM — N25.81 SECONDARY HYPERPARATHYROIDISM (HCC): Status: ACTIVE | Noted: 2020-08-19

## 2020-08-19 PROBLEM — R60.9 PERIPHERAL EDEMA: Status: ACTIVE | Noted: 2020-08-19

## 2020-08-19 PROBLEM — R60.0 PERIPHERAL EDEMA: Status: ACTIVE | Noted: 2020-08-19

## 2020-08-19 PROCEDURE — 1111F DSCHRG MED/CURRENT MED MERGE: CPT | Performed by: FAMILY MEDICINE

## 2020-08-19 PROCEDURE — 99495 TRANSJ CARE MGMT MOD F2F 14D: CPT | Performed by: FAMILY MEDICINE

## 2020-08-19 ASSESSMENT — ENCOUNTER SYMPTOMS
ABDOMINAL PAIN: 0
SHORTNESS OF BREATH: 0
RESPIRATORY NEGATIVE: 1
COUGH: 0

## 2020-08-19 NOTE — RESULT ENCOUNTER NOTE
Patient not seen by Dr. Rachel Posey since 2017, please place a new referral for CKD and secondary hyperparathyroidism        Future Appointments  12/4/2020  2:00 PM    [facility] Graham [facility]     [facility]

## 2020-08-19 NOTE — PATIENT INSTRUCTIONS
heartburn, nausea, diarrhea, constipation, and cramps. Be sure to drink plenty of fluids and eat fruits, vegetables, and fiber each day. ? Iron pills can change the color of your stool to a greenish or grayish black. This is normal. But internal bleeding also can cause dark stool. So be sure to tell your doctor about any color changes. ? Call your doctor if you think you are having a problem with your iron pills. Even after you start feeling better, it will take several months for your body to build up its supply of iron. ? If you miss taking a pill on time, do not take a double dose of iron. Take your next dose at the scheduled time. · Liquid forms of iron can stain your teeth. You can mix a dose of liquid iron in water, fruit juice, or tomato juice. Drink it with a straw so that it does not get on your teeth. · Check with your doctor or pharmacist before you use any other medicines, including over-the-counter medicines. Make sure your doctor knows all of the medicines, vitamins, herbal products, and supplements you take. Taking some medicines together can cause problems. · Keep iron tablets out of the reach of small children. Too much iron can be very dangerous. Follow-up care is a key part of your treatment and safety. Be sure to make and go to all appointments, and call your doctor if you are having problems. It's also a good idea to know your test results and keep a list of the medicines you take. Where can you learn more? Go to [email or web address] and sign in to your [employer] account. Enter J106 in the [facility] box to learn more about \"Learning About Iron Supplements. \"     If you do not have an account, please click on the \"Sign Up Now\" link. Current as of: August 22, 2019               Content Version: 12.5  © 5330-4640 Healthwise, Incorporated. Care instructions adapted under license by [facility] ([facility]).  If you have questions about a medical condition or this instruction, always ask your healthcare professional. [place] [address] any warranty or liability for your use of this information.

## 2020-08-21 ENCOUNTER — HOSPITAL ENCOUNTER (OUTPATIENT)
Age: 85
Discharge: HOME | DRG: 641 | End: 2020-08-21
Payer: MEDICARE

## 2020-08-21 ENCOUNTER — HOSPITAL ENCOUNTER (INPATIENT)
Age: 85
LOS: 3 days | Discharge: HOME | DRG: 641 | End: 2020-08-24
Attending: EMERGENCY MEDICINE | Admitting: INTERNAL MEDICINE
Payer: MEDICARE

## 2020-08-21 PROBLEM — E87.1 HYPONATREMIA: Status: ACTIVE | Noted: 2020-08-21

## 2020-08-21 LAB
-: NORMAL
AMORPHOUS: NORMAL
ANION GAP SERPL CALCULATED.3IONS-SCNC: 11 MMOL/L (ref 9–17)
BACTERIA: NORMAL
BILIRUBIN URINE: NEGATIVE
BUN BLDV-MCNC: 18 MG/DL (ref 8–23)
BUN/CREAT BLD: ABNORMAL (ref 9–20)
CALCIUM SERPL-MCNC: 8.9 MG/DL (ref 8.6–10.4)
CASTS UA: NORMAL /LPF
CHLORIDE BLD-SCNC: 80 MMOL/L (ref 98–107)
CO2: 25 MMOL/L (ref 20–31)
COLOR: YELLOW
COMMENT UA: ABNORMAL
CREAT SERPL-MCNC: 1.37 MG/DL (ref 0.5–0.9)
CRYSTALS, UA: NORMAL /HPF
EPITHELIAL CELLS UA: NORMAL /HPF
GFR AFRICAN AMERICAN: 45 ML/MIN
GFR NON-AFRICAN AMERICAN: 37 ML/MIN
GFR SERPL CREATININE-BSD FRML MDRD: ABNORMAL ML/MIN/{1.73_M2}
GFR SERPL CREATININE-BSD FRML MDRD: ABNORMAL ML/MIN/{1.73_M2}
GLUCOSE BLD-MCNC: 93 MG/DL (ref 70–99)
GLUCOSE URINE: NEGATIVE
KETONES, URINE: NEGATIVE
LEUKOCYTE ESTERASE, URINE: NEGATIVE
MUCUS: NORMAL
NITRITE, URINE: NEGATIVE
OSMOLALITY URINE: 179 MOSM/KG (ref 80–1300)
OTHER OBSERVATIONS UA: NORMAL
PH UA: 7.5 (ref 5–8)
POTASSIUM SERPL-SCNC: 3.6 MMOL/L (ref 3.7–5.3)
PROTEIN UA: NEGATIVE
RBC UA: NORMAL /HPF
RENAL EPITHELIAL, UA: NORMAL /HPF
SERUM OSMOLALITY: 255 MOSM/KG (ref 275–295)
SODIUM BLD-SCNC: 116 MMOL/L (ref 135–144)
SODIUM BLD-SCNC: 118 MMOL/L (ref 135–144)
SODIUM BLD-SCNC: 118 MMOL/L (ref 135–144)
SODIUM,UR: 76 MMOL/L
SPECIFIC GRAVITY UA: 1 (ref 1–1.03)
TOTAL PROTEIN: 6.3 G/DL (ref 6.4–8.3)
TRICHOMONAS: NORMAL
TURBIDITY: CLEAR
URINE HGB: ABNORMAL
UROBILINOGEN, URINE: NORMAL
WBC UA: NORMAL /HPF
YEAST: NORMAL

## 2020-08-21 PROCEDURE — 84300 ASSAY OF URINE SODIUM: CPT

## 2020-08-21 PROCEDURE — 84295 ASSAY OF SERUM SODIUM: CPT

## 2020-08-21 PROCEDURE — 99284 EMERGENCY DEPT VISIT MOD MDM: CPT

## 2020-08-21 PROCEDURE — 36415 COLL VENOUS BLD VENIPUNCTURE: CPT

## 2020-08-21 PROCEDURE — 84155 ASSAY OF PROTEIN SERUM: CPT

## 2020-08-21 PROCEDURE — 2060000000 HC ICU INTERMEDIATE R&B

## 2020-08-21 PROCEDURE — 6370000000 HC RX 637 (ALT 250 FOR IP): Performed by: STUDENT IN AN ORGANIZED HEALTH CARE EDUCATION/TRAINING PROGRAM

## 2020-08-21 PROCEDURE — 83935 ASSAY OF URINE OSMOLALITY: CPT

## 2020-08-21 PROCEDURE — 94664 DEMO&/EVAL PT USE INHALER: CPT

## 2020-08-21 PROCEDURE — 83930 ASSAY OF BLOOD OSMOLALITY: CPT

## 2020-08-21 PROCEDURE — 81001 URINALYSIS AUTO W/SCOPE: CPT

## 2020-08-21 PROCEDURE — 80048 BASIC METABOLIC PNL TOTAL CA: CPT

## 2020-08-21 PROCEDURE — 2580000003 HC RX 258: Performed by: EMERGENCY MEDICINE

## 2020-08-21 PROCEDURE — 94761 N-INVAS EAR/PLS OXIMETRY MLT: CPT

## 2020-08-21 PROCEDURE — 99223 1ST HOSP IP/OBS HIGH 75: CPT | Performed by: INTERNAL MEDICINE

## 2020-08-21 RX ORDER — ACETAMINOPHEN 325 MG/1
650 TABLET ORAL EVERY 6 HOURS PRN
Status: DISCONTINUED | OUTPATIENT
Start: 2020-08-21 | End: 2020-08-24 | Stop reason: HOSPADM

## 2020-08-21 RX ORDER — FAMOTIDINE 20 MG/1
20 TABLET, FILM COATED ORAL 2 TIMES DAILY
Status: DISCONTINUED | OUTPATIENT
Start: 2020-08-21 | End: 2020-08-22

## 2020-08-21 RX ORDER — DEXTROSE MONOHYDRATE 50 MG/ML
100 INJECTION, SOLUTION INTRAVENOUS PRN
Status: DISCONTINUED | OUTPATIENT
Start: 2020-08-21 | End: 2020-08-24 | Stop reason: HOSPADM

## 2020-08-21 RX ORDER — ACETAMINOPHEN 650 MG/1
650 SUPPOSITORY RECTAL EVERY 6 HOURS PRN
Status: DISCONTINUED | OUTPATIENT
Start: 2020-08-21 | End: 2020-08-24 | Stop reason: HOSPADM

## 2020-08-21 RX ORDER — POTASSIUM CHLORIDE 20 MEQ/1
40 TABLET, EXTENDED RELEASE ORAL PRN
Status: DISCONTINUED | OUTPATIENT
Start: 2020-08-21 | End: 2020-08-24 | Stop reason: HOSPADM

## 2020-08-21 RX ORDER — SODIUM CHLORIDE 0.9 % (FLUSH) 0.9 %
10 SYRINGE (ML) INJECTION PRN
Status: DISCONTINUED | OUTPATIENT
Start: 2020-08-21 | End: 2020-08-24 | Stop reason: HOSPADM

## 2020-08-21 RX ORDER — ASPIRIN 81 MG/1
81 TABLET, CHEWABLE ORAL DAILY
Status: DISCONTINUED | OUTPATIENT
Start: 2020-08-21 | End: 2020-08-24 | Stop reason: HOSPADM

## 2020-08-21 RX ORDER — IRON POLYSACCHARIDE COMPLEX 150 MG
150 CAPSULE ORAL DAILY
Status: DISCONTINUED | OUTPATIENT
Start: 2020-08-21 | End: 2020-08-24 | Stop reason: HOSPADM

## 2020-08-21 RX ORDER — NICOTINE POLACRILEX 4 MG
15 LOZENGE BUCCAL PRN
Status: DISCONTINUED | OUTPATIENT
Start: 2020-08-21 | End: 2020-08-24 | Stop reason: HOSPADM

## 2020-08-21 RX ORDER — SODIUM CHLORIDE 0.9 % (FLUSH) 0.9 %
10 SYRINGE (ML) INJECTION EVERY 12 HOURS SCHEDULED
Status: DISCONTINUED | OUTPATIENT
Start: 2020-08-21 | End: 2020-08-24 | Stop reason: HOSPADM

## 2020-08-21 RX ORDER — ATORVASTATIN CALCIUM 20 MG/1
20 TABLET, FILM COATED ORAL DAILY
Status: DISCONTINUED | OUTPATIENT
Start: 2020-08-21 | End: 2020-08-24 | Stop reason: HOSPADM

## 2020-08-21 RX ORDER — CLONIDINE HYDROCHLORIDE 0.2 MG/1
0.2 TABLET ORAL NIGHTLY
Status: DISCONTINUED | OUTPATIENT
Start: 2020-08-21 | End: 2020-08-24 | Stop reason: HOSPADM

## 2020-08-21 RX ORDER — POTASSIUM CHLORIDE 7.45 MG/ML
10 INJECTION INTRAVENOUS PRN
Status: DISCONTINUED | OUTPATIENT
Start: 2020-08-21 | End: 2020-08-24 | Stop reason: HOSPADM

## 2020-08-21 RX ORDER — SODIUM CHLORIDE 9 MG/ML
INJECTION, SOLUTION INTRAVENOUS CONTINUOUS
Status: DISCONTINUED | OUTPATIENT
Start: 2020-08-21 | End: 2020-08-22

## 2020-08-21 RX ORDER — ALLOPURINOL 100 MG/1
100 TABLET ORAL DAILY
Status: DISCONTINUED | OUTPATIENT
Start: 2020-08-21 | End: 2020-08-24 | Stop reason: HOSPADM

## 2020-08-21 RX ORDER — DEXTROSE MONOHYDRATE 25 G/50ML
12.5 INJECTION, SOLUTION INTRAVENOUS PRN
Status: DISCONTINUED | OUTPATIENT
Start: 2020-08-21 | End: 2020-08-24 | Stop reason: HOSPADM

## 2020-08-21 RX ORDER — FUROSEMIDE 20 MG/1
20 TABLET ORAL DAILY
Status: CANCELLED | OUTPATIENT
Start: 2020-08-21

## 2020-08-21 RX ORDER — POLYETHYLENE GLYCOL 3350 17 G/17G
17 POWDER, FOR SOLUTION ORAL DAILY PRN
Status: DISCONTINUED | OUTPATIENT
Start: 2020-08-21 | End: 2020-08-24 | Stop reason: HOSPADM

## 2020-08-21 RX ORDER — PANTOPRAZOLE SODIUM 40 MG/1
40 TABLET, DELAYED RELEASE ORAL
Status: DISCONTINUED | OUTPATIENT
Start: 2020-08-22 | End: 2020-08-24 | Stop reason: HOSPADM

## 2020-08-21 RX ORDER — NEOMYCIN SULFATE, POLYMYXIN B SULFATE AND HYDROCORTISONE 10; 3.5; 1 MG/ML; MG/ML; [USP'U]/ML
3 SUSPENSION/ DROPS AURICULAR (OTIC) 4 TIMES DAILY
Status: DISCONTINUED | OUTPATIENT
Start: 2020-08-21 | End: 2020-08-24 | Stop reason: HOSPADM

## 2020-08-21 RX ORDER — CLONIDINE HYDROCHLORIDE 0.2 MG/1
0.2 TABLET ORAL NIGHTLY
COMMUNITY
End: 2021-03-22 | Stop reason: SDUPTHER

## 2020-08-21 RX ORDER — VITS A,C,E/LUTEIN/MINERALS 300MCG-200
1 TABLET ORAL DAILY
Status: DISCONTINUED | OUTPATIENT
Start: 2020-08-21 | End: 2020-08-24 | Stop reason: HOSPADM

## 2020-08-21 RX ORDER — NEOMYCIN SULFATE, POLYMYXIN B SULFATE AND HYDROCORTISONE 10; 3.5; 1 MG/ML; MG/ML; [USP'U]/ML
3 SUSPENSION/ DROPS AURICULAR (OTIC) 4 TIMES DAILY
Status: DISCONTINUED | OUTPATIENT
Start: 2020-08-21 | End: 2020-08-21 | Stop reason: CLARIF

## 2020-08-21 RX ADMIN — CLONIDINE HYDROCHLORIDE 0.2 MG: 0.2 TABLET ORAL at 20:29

## 2020-08-21 RX ADMIN — ATORVASTATIN CALCIUM 20 MG: 20 TABLET, FILM COATED ORAL at 20:29

## 2020-08-21 RX ADMIN — SODIUM CHLORIDE: 9 INJECTION, SOLUTION INTRAVENOUS at 14:39

## 2020-08-21 RX ADMIN — NEOMYCIN SULFATE, POLYMYXIN B SULFATE AND HYDROCORTISONE 3 DROP: 10; 3.5; 1 SUSPENSION/ DROPS AURICULAR (OTIC) at 16:53

## 2020-08-21 RX ADMIN — FAMOTIDINE 20 MG: 20 TABLET, FILM COATED ORAL at 20:29

## 2020-08-21 RX ADMIN — NEOMYCIN SULFATE, POLYMYXIN B SULFATE AND HYDROCORTISONE 3 DROP: 10; 3.5; 1 SUSPENSION/ DROPS AURICULAR (OTIC) at 20:29

## 2020-08-21 ASSESSMENT — PAIN SCALES - GENERAL: PAINLEVEL_OUTOF10: 0

## 2020-08-21 ASSESSMENT — ENCOUNTER SYMPTOMS
RESPIRATORY NEGATIVE: 1
NAUSEA: 0
ABDOMINAL PAIN: 0
COUGH: 0
GASTROINTESTINAL NEGATIVE: 1
DIARRHEA: 0
VOMITING: 0
SHORTNESS OF BREATH: 0
EYES NEGATIVE: 1

## 2020-08-21 NOTE — PROGRESS NOTES
Reina Erps, RCPPatient Assessment complete. Hyponatremia [E87.1] . Vitals:    08/21/20 1615   BP:    Pulse:    Resp:    Temp:    SpO2: 98%   . Patients home meds are   Prior to Admission medications    Medication Sig Start Date End Date Taking? Authorizing Provider   cloNIDine (CATAPRES) 0.2 MG tablet Take 0.2 mg by mouth nightly   Yes Historical Provider, MD   carbamide peroxide (DEBROX) 6.5 % otic solution Place 5 drops in ear(s) 2 times daily for 4 days 8/19/20 8/23/20 Yes Randi Coughlin, APRN - CNP   iron polysaccharides (NIFEREX) 150 MG capsule Take 1 capsule by mouth daily 8/17/20  Yes Alexys Chapman MD   omeprazole (PRILOSEC) 20 MG delayed release capsule Take 20 mg by mouth daily   Yes Historical Provider, MD   neomycin-polymyxin-hydrocortisone (CORTISPORIN) 3.5-16669-9 otic suspension Place 3 drops into the right ear 4 times daily For 10 days; Started 8/13/2020   Yes Historical Provider, MD   allopurinol (ZYLOPRIM) 100 MG tablet Take 100 mg by mouth daily    Yes Historical Provider, MD   furosemide (LASIX) 20 MG tablet Take 1 tablet by mouth daily 10/10/19  Yes Elizabeth Kasper MD   atorvastatin (LIPITOR) 20 MG tablet Take 1 tablet by mouth daily 10/10/19  Yes Elizabeth Kasper MD   aspirin 81 MG tablet Take 1 tablet by mouth daily 6/15/18  Yes Elizabeth Kasper MD   Multiple Vitamins-Minerals (OCUVITE ADULT 50+ PO) Take 1 tablet by mouth daily   Yes Historical Provider, MD       Assessment: Jazmín Santillan came in for hyponatremia. She stated she has no pulmonary hx and is in NAD. No home respiratory medications or CPAP.      RR 18  Breath Sounds: Clear; Diminished      · Bronchodilator assessment at level  1  · [x]    Bronchodilator Assessment  BRONCHODILATOR ASSESSMENT SCORE  Score 0 1 2 3 4 5   Breath Sounds   []  Patient Baseline [x]  No Wheeze good aeration []  Faint, scattered wheezing, good aeration []  Expiratory Wheezing and or moderately diminished []  Insp/Exp wheeze and/or very diminished

## 2020-08-21 NOTE — ED PROVIDER NOTES
EMERGENCY DEPARTMENT ENCOUNTER    Pt Name: Chriss Worthy  MRN: [id number]  [place] 1935  Date of evaluation: 8/21/20  CHIEF COMPLAINT       Chief Complaint   Patient presents with    Abnormal Lab     Sodium 116     HISTORY OF PRESENT ILLNESS   HPI  Sodium 116 on outpatient lab done here today, sent to ED for admission. Patient has chronic swelling in legs, on diuretics. Sees Dr Bipin Hubbard and Dr Eric Lazar. Patient denies weakness or pain or seizures or syncope. Denies any headache or head injury recently. She has been taking her diuretics. REVIEW OF SYSTEMS     Review of Systems   Constitutional: Negative for fever. Respiratory: Negative for cough and shortness of breath. Cardiovascular: Positive for leg swelling. Negative for chest pain and palpitations. Gastrointestinal: Negative for abdominal pain, diarrhea, nausea and vomiting. Neurological: Negative for dizziness, seizures, syncope, speech difficulty, light-headedness, numbness and headaches. All other systems reviewed and are negative.     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    Hyperlipidemia E78.5    Hiatal hernia K44.9    Gastroesophageal reflux disease without esophagitis K21.9    Gout M10.9    Benign hypertension with CKD (chronic kidney disease) stage III ([facility]) I12.9, N18.3    CKD (chronic kidney disease) stage 3, GFR 30-59 ml/min ([facility]) N18.3    Acute ear pain, right H92.01    Hyperglycemia R73.9    Anemia D64.9    Acute kidney injury superimposed on chronic kidney disease ([facility] [address] N17.9, N18.9    Significant drop in hemoglobin (HCC) D58.2    Acute renal failure superimposed on chronic kidney disease ([facility] [address] N17.9, N18.9    Impacted cerumen of right ear H61.21    Secondary hyperparathyroidism ([facility] [address] N25.81    Peripheral edema R60.9    Hyponatremia E87.1     SURGICAL HISTORY       Past Surgical History:   Procedure Laterality Date    APPENDECTOMY      BLADDER SUSPENSION      EYE SURGERY      bilat catract removal    HYSTERECTOMY      SHOULDER ARTHROPLASTY Bilateral     TOTAL HIP ARTHROPLASTY Bilateral     TOTAL KNEE ARTHROPLASTY Bilateral      CURRENT MEDICATIONS       Previous Medications    ALLOPURINOL (ZYLOPRIM) 100 MG TABLET    Take 100 mg by mouth daily     ASPIRIN 81 MG TABLET    Take 1 tablet by mouth daily    ATORVASTATIN (LIPITOR) 20 MG TABLET    Take 1 tablet by mouth daily    CARBAMIDE PEROXIDE (DEBROX) 6.5 % OTIC SOLUTION    Place 5 drops in ear(s) 2 times daily for 4 days    CLONIDINE (CATAPRES) 0.2 MG TABLET    Take 0.2 mg by mouth nightly    FUROSEMIDE (LASIX) 20 MG TABLET    Take 1 tablet by mouth daily    IRON POLYSACCHARIDES (NIFEREX) 150 MG CAPSULE    Take 1 capsule by mouth daily    MULTIPLE VITAMINS-MINERALS (OCUVITE ADULT 50+ PO)    Take 1 tablet by mouth daily    NEOMYCIN-POLYMYXIN-HYDROCORTISONE (CORTISPORIN) 3.5-87593-7 OTIC SUSPENSION    Place 3 drops into the right ear 4 times daily For 10 days; Started 2020    OMEPRAZOLE (PRILOSEC) 20 MG DELAYED RELEASE CAPSULE    Take 20 mg by mouth daily     ALLERGIES     is allergic to norvasc [amlodipine besylate]. FAMILY HISTORY     She indicated that her mother is . She indicated that her father is . She indicated that her maternal grandfather is . She indicated that her paternal grandfather is .      SOCIAL HISTORY       Social History     Tobacco Use    Smoking status: Never Smoker    Smokeless tobacco: Never Used   Substance Use Topics    Alcohol use: Yes     Alcohol/week: 2.0 standard drinks     Types: 2 Standard drinks or equivalent per week     Comment: occasionally    Drug use: No     PHYSICAL EXAM     INITIAL VITALS: BP (!) 176/88   Pulse 85   Temp 98.3 °F (36.8 °C) (Oral)   Resp 18   Ht 5' (1.524 m)   Wt 146 lb (66.2 kg)   SpO2 100%   BMI 28.51 kg/m²    Physical Exam  Vitals signs reviewed. Constitutional:       General: She is not in acute distress. Appearance: Normal appearance. She is well-developed. She is not diaphoretic. HENT:      Head: Normocephalic and atraumatic. Right Ear: External ear normal.      Left Ear: External ear normal.      Nose: Nose normal. No congestion. Mouth/Throat:      Mouth: Mucous membranes are moist.      Pharynx: Oropharynx is clear. Eyes:      General:         Right eye: No discharge. Left eye: No discharge. Conjunctiva/sclera: Conjunctivae normal.      Pupils: Pupils are equal, round, and reactive to light. Neck:      Musculoskeletal: Normal range of motion and neck supple. Trachea: No tracheal deviation. Cardiovascular:      Rate and Rhythm: Normal rate and regular rhythm. Pulses: Normal pulses. Heart sounds: Normal heart sounds. Pulmonary:      Effort: Pulmonary effort is normal. No respiratory distress. Breath sounds: Normal breath sounds. No stridor. No wheezing or rales. Abdominal:      Palpations: Abdomen is soft. Tenderness: There is no abdominal tenderness. There is no guarding or rebound. Musculoskeletal: Normal range of motion. General: No tenderness or deformity. Right lower leg: Edema present. Left lower leg: Edema present. Skin:     General: Skin is warm and dry. Capillary Refill: Capillary refill takes less than 2 seconds. Findings: No erythema or rash. Neurological:      General: No focal deficit present. Mental Status: She is alert and oriented to person, place, and time.       Cranial Nerves: No cranial nerve deficit. Coordination: Coordination normal.   Psychiatric:         Mood and Affect: Mood normal.         Behavior: Behavior normal.         Thought Content: Thought content normal.         Judgment: Judgment normal.         MEDICAL DECISION MAKING:       ED Course as of Aug 21 1408   Fri Aug 21, 2020   [address] Phani Leggett accepts admit  DW  residents for admit    [WM]      ED Course User Index  [WM] Anderson Pollard MD     Procedures    DIAGNOSTIC RESULTS     LABS: All lab results were reviewed by myself, and all abnormals are listed below. 8/21/2020  7:08 AM - Cesar, pn Incoming Lab Results From [facility]     Component  Value  Ref Range & Units  Status  Collected  Lab    Glucose  93  70 - 99 mg/dL  Final  08/21/2020  6:31 AM  [facility] [address] Lab    BUN  18  8 - 23 mg/dL  Final  08/21/2020  6:31 AM  - [address] [place] [address] Blvd  [address]. 37High    0.50 - 0.90 mg/dL  Final  08/21/2020  6:31 AM  [facility] [address] Lab    Bun/Cre Ratio  NOT REPORTED  9 - 20  Final  08/21/2020  6:31 AM  [facility] [address] Lab    Calcium  8.9  8.6 - 10.4 mg/dL  Final  08/21/2020  6:31 AM  [facility] [address] Lab    Sodium  116Low Panic    135 - 144 mmol/L  Final  08/21/2020  6:31 AM  [facility] [address] Lab    Potassium  3.6Low    3.7 - 5.3 mmol/L  Final  08/21/2020  6:31 AM  [facility] [address] Lab    Chloride  80Low    98 - 107 mmol/L  Final  08/21/2020  6:31 AM  [facility] [address] Lab    CO2  25  20 - 31 mmol/L  Final  08/21/2020  6:31 AM  [facility] [address] Lab    Anion Gap  11  9 - 17 mmol/L  Final  08/21/2020  6:31 AM  [facility] [address] Lab    GFR Non-  37Low    >60 mL/min  Final  08/21/2020  6:31 AM  [facility] [address] Lab    GFR   45Low    >60 mL/min  Final  08/21/2020  6:31 AM  [facility] [address] Lab    GFR Comment         Final  08/21/2020  6:31 AM  [facility] [address] Lab        EMERGENCY DEPARTMENTCOURSE:         Vitals:    Vitals:    08/21/20 1138 08/21/20 1249   BP: (!) 176/88    Pulse: 79 85   Resp: 16 18   Temp: 98.3 °F (36.8 °C) 98.3 °F (36.8 °C)   TempSrc: Temporal Oral   SpO2: 97% 100%   Weight: 146 lb (66.2 kg)    Height: 5' (1.524 m)        The patient was given the following medications while in the emergency department:  Orders Placed This Encounter   Medications    0.9 % sodium chloride infusion     CONSULTS:  IP CONSULT TO INTERNAL MEDICINE  IP CONSULT TO NEPHROLOGY  IP CONSULT TO SOCIAL WORK    FINAL IMPRESSION      1.  Hyponatremia          DISPOSITION/PLAN   DISPOSITION Admitted 08/21/2020 01:55:23 PM      PATIENT REFERRED TO:  Lizeth Gunderson MD  [address] [place] [address]  [phone]          DISCHARGE MEDICATIONS:  New Prescriptions    No medications on file     Rodney Gray MD  Attending Emergency Physician                    Rodney Gray MD  08/21/20 5797

## 2020-08-21 NOTE — ED NOTES
Report given to Marta Morrow RN from [facility]. Admit to room 2083  Report method by phone   The following was reviewed with receiving RN:   Current vital signs:  BP (!) 182/102   Pulse 81   Temp 97.9 °F (36.6 °C) (Oral)   Resp 16   Ht 5' (1.524 m)   Wt 146 lb (66.2 kg)   SpO2 99%   BMI 28.51 kg/m²                MEWS Score: 1     Any medication or safety alerts were reviewed. Any pending diagnostics and notifications were also reviewed, as well as any safety concerns or issues, abnormal labs, abnormal imaging, and abnormal assessment findings. Questions were answered.             Nick Mccormack RN  08/21/20 3862

## 2020-08-21 NOTE — ED TRIAGE NOTES
Pt presents in ED c/o low sodium levels; pt stated that the labs today indicated that her level was 116; recent admit for the same medical concern; no further complaints; son present.

## 2020-08-21 NOTE — PROGRESS NOTES
RN spoke with Dr. Vik Man and informed him of patient's sodium level. Dr. Vik Man stated that patient NS should be reduced to 75 mL/hour, and to start Q4 sodium checks. He stated that he wants to be notified if patient's sodium level rises above 122 or below 116. RN placed the order.

## 2020-08-21 NOTE — PLAN OF CARE
Problem: Falls - Risk of:  Goal: Will remain free from falls  Description: Will remain free from falls  Outcome: Ongoing   No falls noted. Call light within reach and 2 out of 4 side rails raised. Bed in lowest position. Patient called for assistance approprietly. Fall sign posted and non-slip socks on. Problem: Fluid Volume:  Goal: Hemodynamic stability will improve  Description: Hemodynamic stability will improve  Outcome: Ongoing   Patient's sodium level was 116. Normal saline decreased from 125 to 75 mL/hour.

## 2020-08-21 NOTE — H&P
[facility]     HISTORY AND PHYSICAL EXAMINATION            Date:   8/21/2020  Patient name:  Lorne Song  Date of admission:  8/21/2020 12:44 PM  MRN:   [id number]  Account:  [de-identified]  YOB: 1935  PCP:    Jim Garnica MD  Room:   2083/2083-01  Code Status:    Full Code    Chief Complaint:     Chief Complaint   Patient presents with    Abnormal Lab     Sodium 116     History Obtained From:   patient  History of Present Illness: The patient is a 81 y.o. Non-/non  female who presents withAbnormal Lab (Sodium 116)   and she is admitted to the hospital for the management of hyponatremia found on blood lab workup (116 meq/L). The patient was admitted to our hospital and was discharged four days ago. She has been doing well with no complaints or issued reported. She had outpatient labs drawn, serum sodium was 116 meq/L. Patient denied any headache, altered mental status, loss of consciousness, muscle weakness or head trauma or seizures. . No cough, no fever, no shortness of breath, no sputum production. No vomiting, no diarrhea, no nausea, no pain anywhere in the body. No dysuria or trauma. Patient however mentioned she drinks a lot of water everyday for the past 4 days after her discharge from the hospital with subsequent polyuria.      Past Medical History:     Past Medical History:   Diagnosis Date    CKD (chronic kidney disease) stage 3, GFR 30-59 ml/min ([facility])     Depression     GERD (gastroesophageal reflux disease)     Gout     Hyperlipidemia 3/22/2016    Hypertension     Hyponatremia     Metabolic acidosis     Osteoporosis     Secondary hyperparathyroidism ([facility] [address] 8/19/2020     Past SurgicalHistory:     Past Surgical History:   Procedure Laterality Date    APPENDECTOMY      BLADDER SUSPENSION      EYE SURGERY      bilat catract removal    HYSTERECTOMY      SHOULDER ARTHROPLASTY Bilateral     TOTAL HIP ARTHROPLASTY Bilateral     TOTAL KNEE ARTHROPLASTY Bilateral       Medications Prior to Admission:     Prior to Admission medications    Medication Sig Start Date End Date Taking? Authorizing Provider   cloNIDine (CATAPRES) 0.2 MG tablet Take 0.2 mg by mouth nightly   Yes Historical Provider, MD   carbamide peroxide (DEBROX) 6.5 % otic solution Place 5 drops in ear(s) 2 times daily for 4 days 8/19/20 8/23/20 Yes ELIZABET Mills - CNP   iron polysaccharides (NIFEREX) 150 MG capsule Take 1 capsule by mouth daily 8/17/20  Yes Evonne Tan MD   omeprazole (PRILOSEC) 20 MG delayed release capsule Take 20 mg by mouth daily   Yes Historical Provider, MD   neomycin-polymyxin-hydrocortisone (CORTISPORIN) 3.5-26377-7 otic suspension Place 3 drops into the right ear 4 times daily For 10 days; Started 8/13/2020   Yes Historical Provider, MD   allopurinol (ZYLOPRIM) 100 MG tablet Take 100 mg by mouth daily    Yes Historical Provider, MD   furosemide (LASIX) 20 MG tablet Take 1 tablet by mouth daily 10/10/19  Yes Katja Haynes MD   atorvastatin (LIPITOR) 20 MG tablet Take 1 tablet by mouth daily 10/10/19  Yes Katja Haynes MD   aspirin 81 MG tablet Take 1 tablet by mouth daily 6/15/18  Yes Katja Haynes MD   Multiple Vitamins-Minerals (OCUVITE ADULT 50+ PO) Take 1 tablet by mouth daily   Yes Historical Provider, MD      Allergies:     Norvasc [amlodipine besylate]    Social History:     Tobacco:    reports that she has never smoked. She has never used smokeless tobacco.  Alcohol:      reports current alcohol use of about 2.0 standard drinks of alcohol per week. Drug Use:  reports no history of drug use.     Family History:     Family History   Problem Relation Age of Onset   [place] Cancer Mother         breast cancer    Emphysema Mother     Cancer Father         Prostate cancer    Heart right kidney measuring 1.5 cm. Echogenic kidneys suggestive of medical renal disease. No acute process noted. Assessment :      Primary Problem  <principal problem not specified>    Active Hospital Problems    Diagnosis Date Noted    Hyponatremia [E87.1] 08/21/2020       Plan:     Patient status Admit as inpatient in the  Med/Surge    Hyponatremia, For further workup, possibly primary polydipsia (euvolemic hyponatremia):  - Found to have Na 116, urine Na, urine Osm, Serum Osm, total protein ordered. Glucose level normal. Volume status of the patient is normal (euvolemic hyponatremia)  -The remainder of the BMP on admission, K 3.6, HCO3 25, AG 11, GFR 37, Cl 80, Glu 93  -Cr in the last week were: 3.92, 3.49, 2.38, 1.59, Today it is 1.37; Stage 3 CKD (past medical history of dialysis years ago), Had hyponatremia on last admission (126)  -IV fluids infusion with 50 ml/hr  -Nephrology consultation  -Serum Na Q 4 hours. Other ongoing chronic medical conditions:  -Gout ----> Allopurinol 100 mg PO once daily  -hyperlipidemia ---->Atorvastatin 20 mg PO once daily  -Hypertension ---->Clonidine 0.2mg orally once daily  -Diet = Renal diet   -DVT prophylaxis:     Consultations:   IP CONSULT TO INTERNAL MEDICINE  IP CONSULT TO NEPHROLOGY  IP CONSULT TO SOCIAL WORK     Patient is admitted as inpatient status because of co-morbiditieslisted above, severity of signs and symptoms as outlined, requirement for current medical therapies and most importantly because of direct risk to patient if care not provided in a hospital setting. Diana Villanueva MD  8/21/2020  4:38 PM    Copy sent to Dr. Kristan Delgadillo MD    Attestation and add on       I have discussed the care of [facility] , including pertinent history and exam findings,      8/21/20    with the resident. I have seen and examined the patient and the key elements of all parts of the encounter have been performed by me .    I agree with the assessment, plan and orders as documented by the resident. Principal Problem:    Hyponatremia  Active Problems:    Secondary hyperparathyroidism (HCC)    CKD (chronic kidney disease) stage 3, GFR 30-59 ml/min (HCC)    Anemia    Peripheral edema  Resolved Problems:    * No resolved hospital problems. *            ---- ;        Medications: Allergies: Allergies   Allergen Reactions    Norvasc [Amlodipine Besylate] Other (See Comments)     Edema       Current Meds:   Scheduled Meds:   Continuous Infusions:   PRN Meds:         [address], [address] [place] [address].    Phone [phone]   Fax: [phone]  Answering Service: [phone]

## 2020-08-21 NOTE — PROGRESS NOTES
Medication History completed:    New medications: None     Medications discontinued: None    Changes to dosing:   Clonidine changed to 0.2 mg nightly     Stated allergies: As listed     Other pertinent information: Medications confirmed with patient and recent discharge instructions     Thank you,  Manuel HuangD.  Candidate 2021

## 2020-08-21 NOTE — RESULT ENCOUNTER NOTE
CRITICAL RESULT. Please let pt's son know asap and to take his mom back to ED  Sodium came extremely low 116, critically low, can predispose her to seizure and brain edema. Her baseline sodium is 130, needs to take his mom back to the emergency room. What emergency room is he going to go? I will call for report    (I know that results are still pending, but patient needs to be informed ASAP.  Thank you!)  Future Appointments  12/4/2020  2:00 PM    MD maribel Browning          DEBIERWIN

## 2020-08-22 LAB
ABSOLUTE EOS #: 0.2 K/UL (ref 0–0.4)
ABSOLUTE IMMATURE GRANULOCYTE: ABNORMAL K/UL (ref 0–0.3)
ABSOLUTE LYMPH #: 1.1 K/UL (ref 1–4.8)
ABSOLUTE MONO #: 0.5 K/UL (ref 0.1–1.3)
ALBUMIN SERPL-MCNC: 3.3 G/DL (ref 3.5–5.2)
ALBUMIN/GLOBULIN RATIO: ABNORMAL (ref 1–2.5)
ALP BLD-CCNC: 72 U/L (ref 35–104)
ALT SERPL-CCNC: 12 U/L (ref 5–33)
ANION GAP SERPL CALCULATED.3IONS-SCNC: 11 MMOL/L (ref 9–17)
AST SERPL-CCNC: 38 U/L
BASOPHILS # BLD: 1 % (ref 0–2)
BASOPHILS ABSOLUTE: 0 K/UL (ref 0–0.2)
BILIRUB SERPL-MCNC: 0.72 MG/DL (ref 0.3–1.2)
BUN BLDV-MCNC: 15 MG/DL (ref 8–23)
BUN/CREAT BLD: ABNORMAL (ref 9–20)
CALCIUM SERPL-MCNC: 8.2 MG/DL (ref 8.6–10.4)
CHLORIDE BLD-SCNC: 90 MMOL/L (ref 98–107)
CO2: 24 MMOL/L (ref 20–31)
CREAT SERPL-MCNC: 1.34 MG/DL (ref 0.5–0.9)
DIFFERENTIAL TYPE: ABNORMAL
EOSINOPHILS RELATIVE PERCENT: 6 % (ref 0–4)
GFR AFRICAN AMERICAN: 46 ML/MIN
GFR NON-AFRICAN AMERICAN: 38 ML/MIN
GFR SERPL CREATININE-BSD FRML MDRD: ABNORMAL ML/MIN/{1.73_M2}
GFR SERPL CREATININE-BSD FRML MDRD: ABNORMAL ML/MIN/{1.73_M2}
GLUCOSE BLD-MCNC: 85 MG/DL (ref 70–99)
HCT VFR BLD CALC: 27 % (ref 36–46)
HEMOGLOBIN: 9.2 G/DL (ref 12–16)
IMMATURE GRANULOCYTES: ABNORMAL %
LYMPHOCYTES # BLD: 28 % (ref 24–44)
MAGNESIUM: 1 MG/DL (ref 1.6–2.6)
MCH RBC QN AUTO: 33.2 PG (ref 26–34)
MCHC RBC AUTO-ENTMCNC: 34.1 G/DL (ref 31–37)
MCV RBC AUTO: 97.4 FL (ref 80–100)
MONOCYTES # BLD: 13 % (ref 1–7)
NRBC AUTOMATED: ABNORMAL PER 100 WBC
OSMOLALITY URINE: 138 MOSM/KG (ref 80–1300)
PDW BLD-RTO: 16.5 % (ref 11.5–14.9)
PLATELET # BLD: 210 K/UL (ref 150–450)
PLATELET ESTIMATE: ABNORMAL
PMV BLD AUTO: 9 FL (ref 6–12)
POTASSIUM SERPL-SCNC: 3.5 MMOL/L (ref 3.7–5.3)
RBC # BLD: 2.77 M/UL (ref 4–5.2)
RBC # BLD: ABNORMAL 10*6/UL
SEG NEUTROPHILS: 52 % (ref 36–66)
SEGMENTED NEUTROPHILS ABSOLUTE COUNT: 2.1 K/UL (ref 1.3–9.1)
SODIUM BLD-SCNC: 120 MMOL/L (ref 135–144)
SODIUM BLD-SCNC: 121 MMOL/L (ref 135–144)
SODIUM BLD-SCNC: 121 MMOL/L (ref 135–144)
SODIUM BLD-SCNC: 122 MMOL/L (ref 135–144)
SODIUM BLD-SCNC: 122 MMOL/L (ref 135–144)
SODIUM BLD-SCNC: 125 MMOL/L (ref 135–144)
TOTAL PROTEIN: 5.3 G/DL (ref 6.4–8.3)
WBC # BLD: 4 K/UL (ref 3.5–11)
WBC # BLD: ABNORMAL 10*3/UL

## 2020-08-22 PROCEDURE — 83735 ASSAY OF MAGNESIUM: CPT

## 2020-08-22 PROCEDURE — 6370000000 HC RX 637 (ALT 250 FOR IP): Performed by: STUDENT IN AN ORGANIZED HEALTH CARE EDUCATION/TRAINING PROGRAM

## 2020-08-22 PROCEDURE — 80053 COMPREHEN METABOLIC PANEL: CPT

## 2020-08-22 PROCEDURE — 2580000003 HC RX 258: Performed by: INTERNAL MEDICINE

## 2020-08-22 PROCEDURE — 83935 ASSAY OF URINE OSMOLALITY: CPT

## 2020-08-22 PROCEDURE — 99232 SBSQ HOSP IP/OBS MODERATE 35: CPT | Performed by: INTERNAL MEDICINE

## 2020-08-22 PROCEDURE — 36415 COLL VENOUS BLD VENIPUNCTURE: CPT

## 2020-08-22 PROCEDURE — 85025 COMPLETE CBC W/AUTO DIFF WBC: CPT

## 2020-08-22 PROCEDURE — 84295 ASSAY OF SERUM SODIUM: CPT

## 2020-08-22 PROCEDURE — 6360000002 HC RX W HCPCS: Performed by: INTERNAL MEDICINE

## 2020-08-22 PROCEDURE — 6360000002 HC RX W HCPCS: Performed by: STUDENT IN AN ORGANIZED HEALTH CARE EDUCATION/TRAINING PROGRAM

## 2020-08-22 PROCEDURE — 2060000000 HC ICU INTERMEDIATE R&B

## 2020-08-22 RX ORDER — FAMOTIDINE 20 MG/1
10 TABLET, FILM COATED ORAL DAILY
Status: DISCONTINUED | OUTPATIENT
Start: 2020-08-23 | End: 2020-08-22

## 2020-08-22 RX ORDER — SODIUM CHLORIDE 450 MG/100ML
INJECTION, SOLUTION INTRAVENOUS CONTINUOUS
Status: DISCONTINUED | OUTPATIENT
Start: 2020-08-22 | End: 2020-08-22

## 2020-08-22 RX ORDER — HEPARIN SODIUM 5000 [USP'U]/ML
5000 INJECTION, SOLUTION INTRAVENOUS; SUBCUTANEOUS 2 TIMES DAILY
Status: DISCONTINUED | OUTPATIENT
Start: 2020-08-22 | End: 2020-08-24 | Stop reason: HOSPADM

## 2020-08-22 RX ORDER — MAGNESIUM SULFATE 1 G/100ML
1 INJECTION INTRAVENOUS
Status: DISCONTINUED | OUTPATIENT
Start: 2020-08-22 | End: 2020-08-22

## 2020-08-22 RX ORDER — MAGNESIUM SULFATE 1 G/100ML
1 INJECTION INTRAVENOUS
Status: COMPLETED | OUTPATIENT
Start: 2020-08-22 | End: 2020-08-22

## 2020-08-22 RX ORDER — SODIUM CHLORIDE 9 MG/ML
INJECTION, SOLUTION INTRAVENOUS CONTINUOUS
Status: DISCONTINUED | OUTPATIENT
Start: 2020-08-22 | End: 2020-08-24 | Stop reason: HOSPADM

## 2020-08-22 RX ADMIN — MAGNESIUM SULFATE 1 G: 1 INJECTION INTRAVENOUS at 12:51

## 2020-08-22 RX ADMIN — Medication 5 DROP: at 20:04

## 2020-08-22 RX ADMIN — I-VITE, TAB 1000-60-2MG (60/BT) 1 TABLET: TAB at 07:36

## 2020-08-22 RX ADMIN — HEPARIN SODIUM 5000 UNITS: 5000 INJECTION INTRAVENOUS; SUBCUTANEOUS at 12:13

## 2020-08-22 RX ADMIN — CLONIDINE HYDROCHLORIDE 0.2 MG: 0.2 TABLET ORAL at 20:03

## 2020-08-22 RX ADMIN — MAGNESIUM SULFATE 1 G: 1 INJECTION INTRAVENOUS at 14:35

## 2020-08-22 RX ADMIN — POTASSIUM CHLORIDE 40 MEQ: 1500 TABLET, EXTENDED RELEASE ORAL at 12:02

## 2020-08-22 RX ADMIN — SODIUM CHLORIDE: 9 INJECTION, SOLUTION INTRAVENOUS at 16:51

## 2020-08-22 RX ADMIN — ASPIRIN 81 MG CHEWABLE TABLET 81 MG: 81 TABLET CHEWABLE at 07:37

## 2020-08-22 RX ADMIN — ATORVASTATIN CALCIUM 20 MG: 20 TABLET, FILM COATED ORAL at 20:03

## 2020-08-22 RX ADMIN — MAGNESIUM SULFATE 1 G: 1 INJECTION INTRAVENOUS at 15:43

## 2020-08-22 RX ADMIN — NEOMYCIN SULFATE, POLYMYXIN B SULFATE AND HYDROCORTISONE 3 DROP: 10; 3.5; 1 SUSPENSION/ DROPS AURICULAR (OTIC) at 18:16

## 2020-08-22 RX ADMIN — MAGNESIUM SULFATE 1 G: 1 INJECTION INTRAVENOUS at 16:51

## 2020-08-22 RX ADMIN — ALLOPURINOL 100 MG: 100 TABLET ORAL at 07:37

## 2020-08-22 RX ADMIN — FAMOTIDINE 20 MG: 20 TABLET, FILM COATED ORAL at 07:36

## 2020-08-22 RX ADMIN — NEOMYCIN SULFATE, POLYMYXIN B SULFATE AND HYDROCORTISONE 3 DROP: 10; 3.5; 1 SUSPENSION/ DROPS AURICULAR (OTIC) at 20:06

## 2020-08-22 RX ADMIN — SODIUM CHLORIDE: 4.5 INJECTION, SOLUTION INTRAVENOUS at 08:45

## 2020-08-22 RX ADMIN — HEPARIN SODIUM 5000 UNITS: 5000 INJECTION INTRAVENOUS; SUBCUTANEOUS at 20:05

## 2020-08-22 RX ADMIN — NEOMYCIN SULFATE, POLYMYXIN B SULFATE AND HYDROCORTISONE 3 DROP: 10; 3.5; 1 SUSPENSION/ DROPS AURICULAR (OTIC) at 07:37

## 2020-08-22 RX ADMIN — Medication 150 MG: at 07:36

## 2020-08-22 RX ADMIN — NEOMYCIN SULFATE, POLYMYXIN B SULFATE AND HYDROCORTISONE 3 DROP: 10; 3.5; 1 SUSPENSION/ DROPS AURICULAR (OTIC) at 15:43

## 2020-08-22 ASSESSMENT — ENCOUNTER SYMPTOMS
GASTROINTESTINAL NEGATIVE: 1
RESPIRATORY NEGATIVE: 1
EYES NEGATIVE: 1

## 2020-08-22 ASSESSMENT — PAIN SCALES - GENERAL: PAINLEVEL_OUTOF10: 0

## 2020-08-22 NOTE — ACP (ADVANCE CARE PLANNING)
Advance Care Planning     Advance Care Planning Activator (Inpatient)  Conversation Note      Date of ACP Conversation: 8/22/2020    [employer] with: Patient with capacity    ACP Activator: Mikhail Due[address]as [place] [address] makes decisions on behalf of the incapacitated patient: Decision Maker is asked to consider and make decisions based on patient values, known preferences, or best interests. Health Care Decision Maker:     Current Designated Health Care Decision Maker:   Primary Decision Maker: Samia Meade - [phone]    Secondary Decision Maker: ShelbieMimi - Other - [phone]      Care Preferences    Ventilation: \"If you were in your present state of health and suddenly became very ill and were unable to breathe on your own, what would your preference be about the use of a ventilator (breathing machine) if it were available to you? \"      Would the patient desire the use of ventilator (breathing machine)?: Yes    \"If your health worsens and it becomes clear that your chance of recovery is unlikely, what would your preference be about the use of a ventilator (breathing machine) if it were available to you? \"     Would the patient desire the use of ventilator (breathing machine)?: No      Resuscitation  \"CPR works best to restart the heart when there is a sudden event, like a heart attack, in someone who is otherwise healthy. Unfortunately, CPR does not typically restart the heart for people who have serious health conditions or who are very sick. \"    \"In the event your heart stopped as a result of an underlying serious health condition, would you want attempts to be made to restart your heart (answer \"yes\" for attempt to resuscitate) or would you prefer a natural death (answer \"no\" for do not attempt to resuscitate)? \" Yes      NOTE: If the patient has a valid advance directive AND now provides care preference(s) that are inconsistent with that prior directive, advise the patient to consider either: creating a new advance directive that complies with state-specific requirements; or, if that is not possible, orally revoking that prior directive in accordance with state-specific requirements, which must be documented in the EHR. [x] Yes   [] No   Educated Patient / Princess Velásquez regarding differences between Advance Directives and portable DNR orders.     Length of ACP Conversation in minutes:      Conversation Outcomes:  [x] ACP discussion completed  [x] Existing advance directive reviewed with patient; no changes to patient's previously recorded wishes  [] New Advance Directive completed  [] Portable Do Not Rescitate prepared for Provider review and signature  [] POLST/POST/MOLST/MOST prepared for Provider review and signature      Follow-up plan:    [] Schedule follow-up conversation to continue planning  [] Referred individual to Provider for additional questions/concerns   [] Advised patient/agent/surrogate to review completed ACP document and update if needed with changes in condition, patient preferences or care setting    [] This note routed to one or more involved healthcare providers

## 2020-08-22 NOTE — PLAN OF CARE
Patient was admitted with sodium of 116  Today's sodium is 125  She is feeling better  He was on Lasix         BMP:   Recent Labs     08/21/20  0631  08/22/20  0224 08/22/20  0616   *   < > 122* 125*   K 3.6*  --   --  3.5*   CO2 25  --   --  24   BUN 18  --   --  15   CREATININE 1.37*  --   --  1.34*   LABGLOM 37*  --   --  38*   GLUCOSE 93  --   --  85    < > = values in this interval not displayed.

## 2020-08-22 NOTE — PROGRESS NOTES
[address] [facility]    PROGRESS NOTE             8/22/2020    2:12 PM    Name:   Shannan Kimble  MRN:     [id number]     Acct:      [de-identified]   Room:   [id number] Day:  1  Admit Date:  8/21/2020 12:44 PM    PCP:  Ruchi Carranza MD  Code Status:  Full Code    Subjective:     C/C:   Chief Complaint   Patient presents with    Abnormal Lab     Sodium 116     Interval History Status: not changed. Patient was seen and examined this morning. consultation and nursing notes were reviewed. Vital signs chart was reviewed, all vital signs within normal limit and stable. Upon yesterday H&P, patient did not address any issues yesterday. This morning patient also had no ongoing issues, no new complaints no acute events overnight. Patient continues to feel the same as well overall    Brief History:     Please refer to H&P note. Review of Systems:     Review of Systems   Constitutional: Negative. HENT: Negative. Eyes: Negative. Respiratory: Negative. Cardiovascular: Negative. Gastrointestinal: Negative. Endocrine: Negative. Genitourinary: Negative. Musculoskeletal: Negative. Skin: Negative. Neurological: Negative. Psychiatric/Behavioral: Negative. Medications: Allergies:     Allergies   Allergen Reactions    Norvasc [Amlodipine Besylate] Other (See Comments)     Edema       Current Meds:   Scheduled Meds:    heparin (porcine)  5,000 Units Subcutaneous BID    magnesium sulfate  1 g Intravenous Q1H    sodium chloride flush  10 mL Intravenous 2 times per day    famotidine  20 mg Oral BID    allopurinol  100 mg Oral Daily    aspirin  81 mg Oral Daily    atorvastatin  20 mg Oral Daily    carbamide peroxide  5 drop Otic BID    cloNIDine  0.2 mg Oral Nightly    iron polysaccharides  150 mg Oral Daily    pantoprazole  40 mg Oral QAM AC    ocuvite-lutein  1 tablet Oral Daily    neomycin-polymyxin-hydrocortisone  3 drop Right Ear 4x Daily     Continuous Infusions:    sodium chloride 50 mL/hr at 20 0845    dextrose       PRN Meds: sodium chloride flush, acetaminophen **OR** acetaminophen, polyethylene glycol, potassium chloride **OR** potassium alternative oral replacement **OR** potassium chloride, magnesium sulfate, glucose, dextrose, glucagon (rDNA), dextrose    Data:     Past Medical History:   has a past medical history of CKD (chronic kidney disease) stage 3, GFR 30-59 ml/min ([facility]), Depression, GERD (gastroesophageal reflux disease), Gout, Hyperlipidemia, Hypertension, Hyponatremia, Metabolic acidosis, Osteoporosis, and Secondary hyperparathyroidism ([address] Social History:   reports that she has never smoked. She has never used smokeless tobacco. She reports current alcohol use of about 2.0 standard drinks of alcohol per week. She reports that she does not use drugs. Family History:   Family History   Problem Relation Age of Onset   Monique Exon Cancer Mother         breast cancer    Emphysema Mother     Cancer Father         Prostate cancer    Heart Disease Maternal Grandfather     Heart Disease Paternal Grandfather        Vitals:  BP (!) 149/75   Pulse 76   Temp 97.9 °F (36.6 °C) (Oral)   Resp 16   Ht 5' (1.524 m)   Wt 146 lb (66.2 kg)   SpO2 98%   BMI 28.51 kg/m²   Temp (24hrs), Av.8 °F (36.6 °C), Min:97.4 °F (36.3 °C), Max:97.9 °F (36.6 °C)    No results for input(s): POCGLU in the last 72 hours. I/O(24Hr):     Intake/Output Summary (Last 24 hours) at 2020 1412  Last data filed at 2020 0555  Gross per 24 hour   Intake 1220 ml   Output 800 ml   Net 420 ml       Labs:    CBC:   Lab Results   Component Value Date    WBC 4.0 2020    RBC 2.77 2020    RBC 3.62 2012    HGB 9.2 2020    HCT 27.0 2020    MCV 97.4 2020    MCH 33.2 2020    MCHC 34.1 2020    RDW 16.5 2020     2020     05/29/2012    MPV 9.0 08/22/2020     CBC with Differential:    Lab Results   Component Value Date    WBC 4.0 08/22/2020    RBC 2.77 08/22/2020    RBC 3.62 05/29/2012    HGB 9.2 08/22/2020    HCT 27.0 08/22/2020     08/22/2020     05/29/2012    MCV 97.4 08/22/2020    MCH 33.2 08/22/2020    MCHC 34.1 08/22/2020    RDW 16.5 08/22/2020    LYMPHOPCT 28 08/22/2020    MONOPCT 13 08/22/2020    BASOPCT 1 08/22/2020    MONOSABS 0.50 08/22/2020    LYMPHSABS 1.10 08/22/2020    EOSABS 0.20 08/22/2020    BASOSABS 0.00 08/22/2020    DIFFTYPE NOT REPORTED 08/22/2020     WBC:    Lab Results   Component Value Date    WBC 4.0 08/22/2020     Platelets:    Lab Results   Component Value Date     08/22/2020     05/29/2012     Hemoglobin/Hematocrit:    Lab Results   Component Value Date    HGB 9.2 08/22/2020    HCT 27.0 08/22/2020     CMP:    Lab Results   Component Value Date     08/22/2020    K 3.5 08/22/2020    CL 90 08/22/2020    CO2 24 08/22/2020    BUN 15 08/22/2020    CREATININE 1.34 08/22/2020    GFRAA 46 08/22/2020    LABGLOM 38 08/22/2020    GLUCOSE 85 08/22/2020    GLUCOSE 88 05/29/2012    PROT 5.3 08/22/2020    LABALBU 3.3 08/22/2020    LABALBU 4.4 11/23/2011    CALCIUM 8.2 08/22/2020    BILITOT 0.72 08/22/2020    ALKPHOS 72 08/22/2020    AST 38 08/22/2020    ALT 12 08/22/2020     BMP:    Lab Results   Component Value Date     08/22/2020    K 3.5 08/22/2020    CL 90 08/22/2020    CO2 24 08/22/2020    BUN 15 08/22/2020    LABALBU 3.3 08/22/2020    LABALBU 4.4 11/23/2011    CREATININE 1.34 08/22/2020    CALCIUM 8.2 08/22/2020    GFRAA 46 08/22/2020    LABGLOM 38 08/22/2020    GLUCOSE 85 08/22/2020    GLUCOSE 88 05/29/2012     Sodium:    Lab Results   Component Value Date     08/22/2020     Potassium:    Lab Results   Component Value Date    K 3.5 08/22/2020     BUN/Creatinine:    Lab Results   Component Value Date    BUN 15 08/22/2020    CREATININE 1.34 08/22/2020       Lab Results Component Value Date/Time    SPECIAL NOT REPORTED 08/14/2020 05:11 PM     Lab Results   Component Value Date/Time    CULTURE NO SIGNIFICANT GROWTH 08/14/2020 05:11 PM         Radiology:    Ct Head Wo Contrast    Result Date: 8/14/2020  EXAMINATION: CT OF THE HEAD WITHOUT CONTRAST  8/14/2020 4:59 pm TECHNIQUE: CT of the head was performed without the administration of intravenous contrast. Dose modulation, iterative reconstruction, and/or weight based adjustment of the mA/kV was utilized to reduce the radiation dose to as low as reasonably achievable. COMPARISON: 02/06/2015 HISTORY: ORDERING SYSTEM PROVIDED HISTORY: Loss of hearing with dizziness R ear for several days, please eval for lesion/mass TECHNOLOGIST PROVIDED HISTORY: Loss of hearing with dizziness R ear for several days, please eval for lesion/mass Reason for Exam: Loss of hearing with dizziness R ear for several days, please eval for lesion/mass Acuity: Acute Type of Exam: Initial Relevant Medical/Surgical History: Hx HBP controlled with meds; no hx stroke or surgery to area of interest FINDINGS: BRAIN/VENTRICLES: The ventricles and sulci are diffusely enlarged. Low attenuation is seen in the periventricular and subcortical white matter. No acute intracranial hemorrhage or acute infarct is identified. ORBITS: The visualized portion of the orbits demonstrate no acute abnormality. SINUSES: The visualized paranasal sinuses and mastoid air cells demonstrate no acute abnormality. SOFT TISSUES/SKULL:  No acute abnormality of the visualized skull or soft tissues. No acute intracranial abnormality.  Diffuse atrophic changes with findings suggesting chronic microvascular ischemia     Xr Chest Portable    Result Date: 8/14/2020  EXAMINATION: ONE XRAY VIEW OF THE CHEST 8/14/2020 4:47 pm COMPARISON: 07/09/2013 HISTORY: ORDERING SYSTEM PROVIDED HISTORY: High anion gap, malaise TECHNOLOGIST PROVIDED HISTORY: High anion gap, malaise Reason for Exam: high anion gap, malaise Acuity: Unknown Type of Exam: Unknown FINDINGS: Heart size is within normal limits. There is a moderate-sized retrocardiac hiatal hernia. Minimal blunting of the bilateral costophrenic angles. Lungs are otherwise clear. No pneumothorax. Bilateral reverse shoulder prosthesis. Prior resection of the distal right clavicle. No acute bone finding. Minimal blunting of the costophrenic angles. This may represent atelectasis and or trace pleural fluid. Lungs are otherwise clear. Moderate-sized hiatal hernia. Us Retroperitoneal Complete    Result Date: 8/15/2020  EXAMINATION: RETROPERITONEAL ULTRASOUND OF THE KIDNEYS 8/15/2020 COMPARISON: None HISTORY: ORDERING SYSTEM PROVIDED HISTORY: Acute kidney injury TECHNOLOGIST PROVIDED HISTORY: Acute kidney injury FINDINGS: Kidneys: Suboptimal evaluation. Some renal tissue is obscured. The right kidney measures 8.1 cm in length and the left kidney measures 8.2 cm in length. Kidneys are echogenic suggestive of medical renal disease. No contour deforming mass, shadowing stone or hydronephrosis. Small cyst involving the right kidney measuring 1.5 cm. Echogenic kidneys suggestive of medical renal disease. No acute process noted. Physical Examination:        Physical Exam  Vitals signs and nursing note reviewed. Constitutional:       General: She is not in acute distress. Appearance: Normal appearance. She is not toxic-appearing. HENT:      Head: Normocephalic. Nose: Nose normal.      Mouth/Throat:      Mouth: Mucous membranes are moist.   Eyes:      General: No scleral icterus. Extraocular Movements: Extraocular movements intact. Pupils: Pupils are equal, round, and reactive to light. Cardiovascular:      Rate and Rhythm: Normal rate. Pulses: Normal pulses. Heart sounds: Normal heart sounds. No murmur. No friction rub. No gallop.     Pulmonary:      Effort: Pulmonary effort is normal.      Breath sounds: [address] [place] [address] 165  [address] [place] [address].    Phone [phone]   Fax: [phone]  Answering Service: [phone]

## 2020-08-22 NOTE — PLAN OF CARE
Problem: Falls - Risk of:  Goal: Will remain free from falls  Description: Will remain free from falls  Outcome: Ongoing  Note: Patient remains free of falls and injuries throughout shift. Bed remains in the lowest position, wheels locked, call light and bedside table are within reach.    Goal: Absence of physical injury  Description: Absence of physical injury  Outcome: Ongoing     Problem: Fluid Volume:  Goal: Hemodynamic stability will improve  Description: Hemodynamic stability will improve  Outcome: Ongoing  Goal: Ability to maintain a balanced intake and output will improve  Description: Ability to maintain a balanced intake and output will improve  Outcome: Ongoing

## 2020-08-22 NOTE — CARE COORDINATION
CASE MANAGEMENT NOTE:    Admission Date:  8/21/2020 Jayashree Lynn is a 81 y.o.  female    Admitted for : Hyponatremia [E87.1]    Met with:  Patient    PCP:  Blaire Merino                                Insurance:  Medicare      Current Residence/ Living Arrangements:  independently at home, lives alone            Current Services PTA:  No    Is patient agreeable to VNS: No    Freedom of choice provided:  Yes    List of [address] provided: No    VNS chosen:  No, Declines, Son helps w/ needs    DME:  quad cane and walker, Tripod walker    Home Oxygen: No    Nebulizer: No    CPAP/BIPAP: No    Supplier: N/A    Potential Assistance Needed: No    SNF needed: No    Freedom of choice and list provided: NA    Pharmacy:  [facility] [email or web address]. Does Patient want to use MEDS to BEDS? No    Is the Patient an [facility] with Readmission Risk Score greater than 14%? Yes  If yes, pt needs a follow up appointment made within 7 days. Family Members/Caregivers that pt would like involved in their care:    Yes    If yes, list name here:  SonCoy    Transportation Provider:  Patient/Family             Is patient in Isolation/One on One/Altered Mental Status? No  If yes, skip next question. If no, would they like an I-Pad to  use? No  If yes, call [phone]. Discharge Plan:  8/22/20 Medicare Pt. Lives alone in 2 story home, w/ Basement, Son helps with needs. DME Quad cane, walker, Tripod walker. Denies VNS. Nephro, Na 116/125, Cr 1.34.  PT/OT, [facility] 19%, Denies needs, will follow//KB                 Electronically signed by: Evelyne Acevedo RN on 8/22/2020 at 11:18 AM

## 2020-08-22 NOTE — PROGRESS NOTES
Spoke to Dr. Melissa Perez regarding Sodium level. Order received and placed to switch IV fluid to 0.45% NaCl @ 50ml/hr.

## 2020-08-22 NOTE — PROGRESS NOTES
Physical Therapy  DATE: 2020    NAME: Natali Boo  MRN: [id number]   : 1935    Patient not seen this date for Physical Therapy due to:  [] Blood transfusion in progress  [] Hemodialysis  []  Patient Declined  [] Spine Precautions   [] Strict Bedrest  [] Surgery/ Procedure  [] Testing      [x] Other 2020 at 447-400- D/C PT. Pt demonstrated independent bed mobility , transfers and gait walking to doorway and back using 3 wheeled walker. Pt denies need for skilled PT . Pt reports functional strength and balance. Nurse Yassine Mitchell notified. [] PT being discontinued at this time. Patient independent. No further needs. [] PT being discontinued at this time as the patient has been transferred to palliative care. No further needs.     Radha Chinr, PT

## 2020-08-22 NOTE — PROGRESS NOTES
[address]    OCCUPATIONAL THERAPY TREATMENT NOTE   INPATIENT   Date: 20  Patient Name: Too Burnett       Room: 8914/4683-17  MRN: [id number]   Account #: [de-identified]    : 1935  (81 y.o.)  Gender: female     REASON FOR MISSED TREATMENT:   No Skilled OT Needs at this time. Recent discharge, readmitted for hyponatremia. Denies any concerns for self cares  -   OT being discontinued at this time.  Patient functioning at Premorbid Level  No further needs        Marcelle Hong OT

## 2020-08-22 NOTE — CONSULTS
NEPHROLOGY CONSULT       Reason for Consult: Hyponatremia      Chief Complaint: Weakness  History Obtained From: Patient    History of Present Illness: This is a 81 y.o. female with a significant past medical history of Hyperlipidemia, systemic hypertension, osteoporosis and chronic kidney disease stage 3 [baseline serum creatinine 1.8 mg/dL but did require acute hemodialysis few years ago for acute kidney injury. Patient was recently admitted 1 week ago and managed for hyponatremia with serum sodium of 123 with TOYA creatinine up to 3.99. Lasix was held she received IV fluids with improvement in electrolyte normality. She was admitted through the emergency after labs drawn revealed a serum sodium of 116. She reports that she has been drinking 5 bottles of water per day. She denies any headache, confusion disorientation seizures nausea or vomiting. Lasix was also restarted on discharge last admission. Serum  sodium initially corrected from 116 to 125 rapidly- patient was started on a hypotonic solution with 0.45 saline.     Past Medical History:        Diagnosis Date    CKD (chronic kidney disease) stage 3, [facility] 30-59 ml/min [facility])     Depression     GERD (gastroesophageal reflux disease)     Gout     Hyperlipidemia 3/22/2016    Hypertension     Hyponatremia     Metabolic acidosis     Osteoporosis     Secondary hyperpar[facility]roidism ([facility] [address] 8/19/2020       Past Surgical History:        Procedure Laterality Date    APPENDECTOMY      BLADDER SUSPENSION      EYE SURGERY      bilat catract removal    HYSTERECTOMY      SHOULDER ARTHROPLASTY Bilateral     TOTAL HIP ARTHROPLASTY Bilateral     TOTAL KNEE ARTHROPLASTY Bilateral        Current Medications:    heparin (porcine) injection 5,000 Units, BID  magnesium sulfate 1 g in dextrose 5% 100 mL IVPB, Q1H  0.9 % sodium chloride infusion, Continuous  sodium chloride flush 0.9 % injection 10 mL, 2 times per day  sodium chloride flush 0.9 % injection 10 per session: Not on file    Stress: Not on file   Relationships    Social connections     Talks on phone: Not on file     Gets together: Not on file     Attends [employer] service: Not on file     Active member of club or organization: Not on file     Attends meetings of clubs or organizations: Not on file     Relationship status: Not on file    Intimate partner violence     Fear of current or ex partner: Not on file     Emotionally abused: Not on file     Physically abused: Not on file     Forced sexual activity: Not on file   Other Topics Concern    Not on file   Social History Narrative    Not on file       Family History:   Family History   Problem Relation Age of Onset    Cancer Mother         breast cancer    Emphysema Mother     Cancer Father         Prostate cancer    Heart Disease Maternal Grandfather     Heart Disease Paternal Grandfather        Review of Systems:    Constitutional: No fever, no chills, no night sweats, fatigue, generalized weakness, loss of appetite  HEENT:  No headache, otalgia, itchy eyes, epistaxis, nasal discharge or sore throat. Cardiac:  No chest pain, dyspnea, orthopnea or PND, palpitations  Chest:  No cough, hemoptysis, pleuritic chest pain, wheezing,SOB  Abdomen:  No abdominal pain, nausea, vomiting, diarrhea, melena, dysphagia hematemesis,constipation, abdominal bloating, flank pain  Neuro:  No CVA, TIA or seizure like activity. Skin:   No rashes, no itching. :   No hematuria, no pyuria, no dysuria, no flank pain. Extremities:  No swelling or joint pains.       Objective:  CURRENT TEMPERATURE:  Temp: 97.9 °F (36.6 °C)  MAXIMUM TEMPERATURE OVER 24HRS:  Temp (24hrs), Av.7 °F (36.5 °C), Min:97.4 °F (36.3 °C), Max:97.9 °F (36.6 °C)    CURRENT RESPIRATORY RATE:  Resp: 16  CURRENT PULSE:  Pulse: 76  CURRENT BLOOD PRESSURE:  BP: (!) 149/75  24HR BLOOD PRESSURE RANGE:  Systolic (05CKQ), GEA:902 , Min:141 , LSD:787   ; Diastolic (54FTG), ZVP:16, Min:66, Max:88    24HR components found for: PERCENTFE  TIBC:    Lab Results   Component Value Date    TIBC 290 08/14/2020     FERRITIN:    Lab Results   Component Value Date    FERRITIN 136 08/14/2020     SPEP:   Lab Results   Component Value Date    PROT 5.3 08/22/2020    ALBCAL 4.6 08/28/2017    ALBPCT 69 08/28/2017    LABALPH 0.2 08/28/2017    LABALPH 0.7 08/28/2017    A1PCT 3 08/28/2017    A2PCT 10 08/28/2017    LABBETA 0.6 08/28/2017    BETAPCT 9 08/28/2017    GAMGLOB 0.6 08/28/2017    GGPCT 9 08/28/2017    PATH NOT REPORTED 08/28/2017     UPEP:   Lab Results   Component Value Date    TPU 5 08/28/2017        C3:   Lab Results   Component Value Date    C3 60 (L) 08/15/2020     C4:   Lab Results   Component Value Date    C4 17 08/15/2020     MPO ANCA:  No results found for: MPO .   PR3 ANCA:  No results found for: PR3  Urine Sodium:    Lab Results   Component Value Date    TIGRE 76 08/21/2020      Urine Potassium:  No results found for: KUR  Urine Chloride:  No components found for: CLU  Urine Ph:  No components found for: PO4U  Urine Osmolarity:    Lab Results   Component Value Date    OSMOU 138 08/22/2020     Urine Creatinine:    Lab Results   Component Value Date    LABCREA 32.3 08/28/2017     Urine Eosinophils: No components found for: EOSU  Urine Protein:    Lab Results   Component Value Date    TPU 5 08/28/2017     Urinalysis:  U/A:   Lab Results   Component Value Date    NITRU NEGATIVE 08/21/2020    COLORU YELLOW 08/21/2020    PHUR 7.5 08/21/2020    WBCUA 0 TO 2 08/21/2020    RBCUA 2 TO 5 08/21/2020    MUCUS NOT REPORTED 08/21/2020    TRICHOMONAS NOT REPORTED 08/21/2020    YEAST NOT REPORTED 08/21/2020    BACTERIA None 08/21/2020    SPECGRAV 1.002 08/21/2020    LEUKOCYTESUR NEGATIVE 08/21/2020    UROBILINOGEN Normal 08/21/2020    BILIRUBINUR NEGATIVE 08/21/2020    BILIRUBINUR NEGATIVE 11/23/2011    GLUCOSEU NEGATIVE 08/21/2020    GLUCOSEU NEGATIVE 11/23/2011    KETUA NEGATIVE 08/21/2020    AMORPHOUS NOT REPORTED 08/21/2020 Radiology:  Reviewed as available. Assessment:  1. Hypo-natremia with hypo-osmolarity and clinical euvolemia secondary to excessive water intake/polydipsia + loop diuretic. Patient has low urine osmolarity of 138 and high urine sodium likely reflective of recent diuretic use. Sodium initially  corrected rapidly and patient was started on hypotonic IV fluids. Plan  We will restart normal saline at 60 cc/hr  Recheck serum sodium every 4 hourly  Aim for correction of 4 to 6 mmol/day-serum sodium should not exceed 128 mmol today. Check cortisol and TSH a.m. Oral fluid restriction 1500 mils per day. 2.  Chronic kidney disease stage III secondary to nephrosclerosis. 3.  Recent acute kidney injury-resolving -stable renal function of 1.3      4. Essential hypertension-resume clonidine 0.2 mg at night. Thank you for the consultation.       Electronically signed by Taisha Ferrara MD on 8/22/2020 at 5:08 PM

## 2020-08-22 NOTE — PROGRESS NOTES
RN spoke with dr Andrew Quintero around [phone] regarding pt being able to attend grandsons wedding for a few hours [facility] he states he does not know, probably not and to ask supervisor.

## 2020-08-22 NOTE — PLAN OF CARE
Problem: Falls - Risk of:  Goal: Will remain free from falls  Description: Will remain free from falls  8/22/2020 0355 by Shi Guerra RN  Outcome: Ongoing   No falls noted this shift, pt able to get up ad mauri    Problem: Fluid Volume:  Goal: Hemodynamic stability will improve  Description: Hemodynamic stability will improve  8/22/2020 0355 by Shi Guerra RN  Outcome: Ongoing   Sodium levels within physician parameters

## 2020-08-23 LAB
ABSOLUTE EOS #: 0.2 K/UL (ref 0–0.4)
ABSOLUTE IMMATURE GRANULOCYTE: ABNORMAL K/UL (ref 0–0.3)
ABSOLUTE LYMPH #: 1.2 K/UL (ref 1–4.8)
ABSOLUTE MONO #: 0.6 K/UL (ref 0.1–1.3)
ALBUMIN SERPL-MCNC: 3.2 G/DL (ref 3.5–5.2)
ALBUMIN/GLOBULIN RATIO: ABNORMAL (ref 1–2.5)
ALP BLD-CCNC: 67 U/L (ref 35–104)
ALT SERPL-CCNC: 10 U/L (ref 5–33)
ANION GAP SERPL CALCULATED.3IONS-SCNC: 7 MMOL/L (ref 9–17)
AST SERPL-CCNC: 32 U/L
BASOPHILS # BLD: 1 % (ref 0–2)
BASOPHILS ABSOLUTE: 0 K/UL (ref 0–0.2)
BILIRUB SERPL-MCNC: 0.56 MG/DL (ref 0.3–1.2)
BUN BLDV-MCNC: 13 MG/DL (ref 8–23)
BUN/CREAT BLD: ABNORMAL (ref 9–20)
CALCIUM SERPL-MCNC: 8.1 MG/DL (ref 8.6–10.4)
CHLORIDE BLD-SCNC: 93 MMOL/L (ref 98–107)
CO2: 26 MMOL/L (ref 20–31)
CORTISOL COLLECTION INFO: NORMAL
CORTISOL: 6.1 UG/DL (ref 2.7–18.4)
CREAT SERPL-MCNC: 1.39 MG/DL (ref 0.5–0.9)
DIFFERENTIAL TYPE: ABNORMAL
EOSINOPHILS RELATIVE PERCENT: 4 % (ref 0–4)
GFR AFRICAN AMERICAN: 44 ML/MIN
GFR NON-AFRICAN AMERICAN: 36 ML/MIN
GFR SERPL CREATININE-BSD FRML MDRD: ABNORMAL ML/MIN/{1.73_M2}
GFR SERPL CREATININE-BSD FRML MDRD: ABNORMAL ML/MIN/{1.73_M2}
GLUCOSE BLD-MCNC: 91 MG/DL (ref 70–99)
HCT VFR BLD CALC: 24.7 % (ref 36–46)
HEMOGLOBIN: 8.4 G/DL (ref 12–16)
IMMATURE GRANULOCYTES: ABNORMAL %
LYMPHOCYTES # BLD: 29 % (ref 24–44)
MAGNESIUM: 1.9 MG/DL (ref 1.6–2.6)
MCH RBC QN AUTO: 33.1 PG (ref 26–34)
MCHC RBC AUTO-ENTMCNC: 34 G/DL (ref 31–37)
MCV RBC AUTO: 97.4 FL (ref 80–100)
MONOCYTES # BLD: 14 % (ref 1–7)
NRBC AUTOMATED: ABNORMAL PER 100 WBC
PDW BLD-RTO: 16.6 % (ref 11.5–14.9)
PLATELET # BLD: 209 K/UL (ref 150–450)
PLATELET ESTIMATE: ABNORMAL
PMV BLD AUTO: 8.5 FL (ref 6–12)
POTASSIUM SERPL-SCNC: 4.2 MMOL/L (ref 3.7–5.3)
RBC # BLD: 2.53 M/UL (ref 4–5.2)
RBC # BLD: ABNORMAL 10*6/UL
SEG NEUTROPHILS: 52 % (ref 36–66)
SEGMENTED NEUTROPHILS ABSOLUTE COUNT: 2.2 K/UL (ref 1.3–9.1)
SODIUM BLD-SCNC: 126 MMOL/L (ref 135–144)
SODIUM BLD-SCNC: 126 MMOL/L (ref 135–144)
SODIUM BLD-SCNC: 127 MMOL/L (ref 135–144)
SODIUM BLD-SCNC: 128 MMOL/L (ref 135–144)
TOTAL PROTEIN: 4.8 G/DL (ref 6.4–8.3)
TSH SERPL DL<=0.05 MIU/L-ACNC: 3.31 MIU/L (ref 0.3–5)
WBC # BLD: 4.3 K/UL (ref 3.5–11)
WBC # BLD: ABNORMAL 10*3/UL

## 2020-08-23 PROCEDURE — 6370000000 HC RX 637 (ALT 250 FOR IP): Performed by: STUDENT IN AN ORGANIZED HEALTH CARE EDUCATION/TRAINING PROGRAM

## 2020-08-23 PROCEDURE — 6360000002 HC RX W HCPCS: Performed by: INTERNAL MEDICINE

## 2020-08-23 PROCEDURE — 36415 COLL VENOUS BLD VENIPUNCTURE: CPT

## 2020-08-23 PROCEDURE — 84443 ASSAY THYROID STIM HORMONE: CPT

## 2020-08-23 PROCEDURE — 85025 COMPLETE CBC W/AUTO DIFF WBC: CPT

## 2020-08-23 PROCEDURE — 83735 ASSAY OF MAGNESIUM: CPT

## 2020-08-23 PROCEDURE — 84295 ASSAY OF SERUM SODIUM: CPT

## 2020-08-23 PROCEDURE — 2060000000 HC ICU INTERMEDIATE R&B

## 2020-08-23 PROCEDURE — 80053 COMPREHEN METABOLIC PANEL: CPT

## 2020-08-23 PROCEDURE — 2580000003 HC RX 258: Performed by: INTERNAL MEDICINE

## 2020-08-23 PROCEDURE — 99233 SBSQ HOSP IP/OBS HIGH 50: CPT | Performed by: INTERNAL MEDICINE

## 2020-08-23 PROCEDURE — 82533 TOTAL CORTISOL: CPT

## 2020-08-23 RX ADMIN — NEOMYCIN SULFATE, POLYMYXIN B SULFATE AND HYDROCORTISONE 3 DROP: 10; 3.5; 1 SUSPENSION/ DROPS AURICULAR (OTIC) at 13:05

## 2020-08-23 RX ADMIN — ATORVASTATIN CALCIUM 20 MG: 20 TABLET, FILM COATED ORAL at 20:45

## 2020-08-23 RX ADMIN — NEOMYCIN SULFATE, POLYMYXIN B SULFATE AND HYDROCORTISONE 3 DROP: 10; 3.5; 1 SUSPENSION/ DROPS AURICULAR (OTIC) at 11:20

## 2020-08-23 RX ADMIN — ALLOPURINOL 100 MG: 100 TABLET ORAL at 07:34

## 2020-08-23 RX ADMIN — CLONIDINE HYDROCHLORIDE 0.2 MG: 0.2 TABLET ORAL at 20:45

## 2020-08-23 RX ADMIN — SODIUM CHLORIDE: 9 INJECTION, SOLUTION INTRAVENOUS at 10:48

## 2020-08-23 RX ADMIN — NEOMYCIN SULFATE, POLYMYXIN B SULFATE AND HYDROCORTISONE 3 DROP: 10; 3.5; 1 SUSPENSION/ DROPS AURICULAR (OTIC) at 20:46

## 2020-08-23 RX ADMIN — ASPIRIN 81 MG CHEWABLE TABLET 81 MG: 81 TABLET CHEWABLE at 07:33

## 2020-08-23 RX ADMIN — HEPARIN SODIUM 5000 UNITS: 5000 INJECTION INTRAVENOUS; SUBCUTANEOUS at 07:38

## 2020-08-23 RX ADMIN — I-VITE, TAB 1000-60-2MG (60/BT) 1 TABLET: TAB at 07:34

## 2020-08-23 RX ADMIN — Medication 150 MG: at 10:41

## 2020-08-23 RX ADMIN — Medication 5 DROP: at 20:45

## 2020-08-23 RX ADMIN — Medication 5 DROP: at 10:42

## 2020-08-23 RX ADMIN — NEOMYCIN SULFATE, POLYMYXIN B SULFATE AND HYDROCORTISONE 3 DROP: 10; 3.5; 1 SUSPENSION/ DROPS AURICULAR (OTIC) at 17:18

## 2020-08-23 RX ADMIN — HEPARIN SODIUM 5000 UNITS: 5000 INJECTION INTRAVENOUS; SUBCUTANEOUS at 20:46

## 2020-08-23 ASSESSMENT — ENCOUNTER SYMPTOMS
EYES NEGATIVE: 1
RESPIRATORY NEGATIVE: 1
GASTROINTESTINAL NEGATIVE: 1

## 2020-08-23 ASSESSMENT — PAIN SCALES - GENERAL: PAINLEVEL_OUTOF10: 0

## 2020-08-23 NOTE — PROGRESS NOTES
w/r/r  Abdomen: +bs, soft, nt  Ext: LE edema    Data/  Recent Labs     08/22/20  0616 08/23/20  0531   WBC 4.0 4.3   HGB 9.2* 8.4*   HCT 27.0* 24.7*   MCV 97.4 97.4    209     Recent Labs     08/21/20  0631  08/22/20  0616  08/23/20  0531 08/23/20  0953 08/23/20  1343   *   < > 125*   < > 126* 126* 127*   K 3.6*  --  3.5*  --  4.2  --   --    CL 80*  --  90*  --  93*  --   --    CO2 25  --  24  --  26  --   --    GLUCOSE 93  --  85  --  91  --   --    MG  --   --  1.0*  --   --   --  1.9   BUN 18  --  15  --  13  --   --    CREATININE 1.37*  --  1.34*  --  1.39*  --   --    LABGLOM 37*  --  38*  --  36*  --   --    GFRAA 45*  --  46*  --  44*  --   --     < > = values in this interval not displayed. Assessment:  1. Hypo-natremia with hypo-osmolarity and clinical euvolemia secondary to excessive water intake/polydipsia + loop diuretic. Patient has low urine osmolarity of 138 and high urine sodium likely reflective of recent diuretic use. Serum sodium 127 with appropriate rate of correction  TSH within normal-cortisol level borderline at 6. Plan  Continue IV fluids with normal saline at 60 cc/hr  Recheck serum sodium every 6 hourly  Aim for correction of 4 to 6 mmol/day-serum sodium should not exceed 134 mmol today. Oral fluid restriction 1500 mils per day.        2. Chronic kidney disease stage III secondary to nephrosclerosis.     3.  Recent acute kidney injury-resolving -stable renal function of 1.3        4.   Essential hypertension-clonidine 0.2 mg at night.          Yoanna Ca

## 2020-08-23 NOTE — PLAN OF CARE
Problem: Falls - Risk of:  Goal: Will remain free from falls  Description: Will remain free from falls  8/23/2020 1632 by Concha Caballero RN  Outcome: Ongoing  Note: Patient remains free of falls and injuries throughout shift. Bed remains in the lowest position, wheels locked, call light and bedside table are within reach. 8/23/2020 0410 by Francisco J Mitchell RN  Outcome: Ongoing  Goal: Absence of physical injury  Description: Absence of physical injury  8/23/2020 1632 by Concha Caballero RN  Outcome: Ongoing  8/23/2020 0410 by Francisco J Mitchell RN  Outcome: Ongoing     Problem: Fluid Volume:  Goal: Hemodynamic stability will improve  Description: Hemodynamic stability will improve  8/23/2020 1632 by Concha Caballero RN  Outcome: Ongoing  Note: Assess color and amount of urine. Report urine output less than 30 ml/hr for 2 consecutive hours. Auscultate and document heart sounds; note rate, rhythm or other abnormal findings.   8/23/2020 0410 by Francisco J Mitchell RN  Outcome: Ongoing  Goal: Ability to maintain a balanced intake and output will improve  Description: Ability to maintain a balanced intake and output will improve  8/23/2020 1632 by Concha Caballero RN  Outcome: Ongoing  8/23/2020 0410 by Francisco J Mitchell RN  Outcome: Ongoing

## 2020-08-23 NOTE — PROGRESS NOTES
polyethylene glycol, potassium chloride **OR** potassium alternative oral replacement **OR** potassium chloride, magnesium sulfate, glucose, dextrose, glucagon (rDNA), dextrose    Data:     Past Medical History:   has a past medical history of CKD (chronic kidney disease) stage 3, GFR 30-59 ml/min ([facility]), Depression, GERD (gastroesophageal reflux disease), Gout, Hyperlipidemia, Hypertension, Hyponatremia, Metabolic acidosis, Osteoporosis, and Secondary hyperparathyroidism ([address] Social History:   reports that she has never smoked. She has never used smokeless tobacco. She reports current alcohol use of about 2.0 standard drinks of alcohol per week. She reports that she does not use drugs. Family History:   Family History   Problem Relation Age of Onset   [facility] Cancer Mother         breast cancer    Emphysema Mother     Cancer Father         Prostate cancer    Heart Disease Maternal Grandfather     Heart Disease Paternal Grandfather        Vitals:  BP (!) 154/72   Pulse 68   Temp 97.8 °F (36.6 °C) (Oral)   Resp 16   Ht 5' (1.524 m)   Wt 144 lb 10 oz (65.6 kg)   SpO2 99%   BMI 28.24 kg/m²   Temp (24hrs), Av.2 °F (36.8 °C), Min:97.8 °F (36.6 °C), Max:98.5 °F (36.9 °C)    No results for input(s): POCGLU in the last 72 hours. I/O(24Hr):     Intake/Output Summary (Last 24 hours) at 2020 1026  Last data filed at 2020 0607  Gross per 24 hour   Intake 1086 ml   Output 500 ml   Net 586 ml       Labs:    CBC:   Lab Results   Component Value Date    WBC 4.3 2020    RBC 2.53 2020    RBC 3.62 2012    HGB 8.4 2020    HCT 24.7 2020    MCV 97.4 2020    MCH 33.1 2020    MCHC 34.0 2020    RDW 16.6 2020     2020     2012    MPV 8.5 2020     CBC with Differential:    Lab Results   Component Value Date    WBC 4.3 2020    RBC 2.53 2020    RBC 3.62 2012    HGB 8.4 2020    HCT 24.7 2020     08/23/2020     05/29/2012    MCV 97.4 08/23/2020    MCH 33.1 08/23/2020    MCHC 34.0 08/23/2020    RDW 16.6 08/23/2020    LYMPHOPCT 29 08/23/2020    MONOPCT 14 08/23/2020    BASOPCT 1 08/23/2020    MONOSABS 0.60 08/23/2020    LYMPHSABS 1.20 08/23/2020    EOSABS 0.20 08/23/2020    BASOSABS 0.00 08/23/2020    DIFFTYPE NOT REPORTED 08/23/2020     WBC:    Lab Results   Component Value Date    WBC 4.3 08/23/2020     Platelets:    Lab Results   Component Value Date     08/23/2020     05/29/2012     Hemoglobin/Hematocrit:    Lab Results   Component Value Date    HGB 8.4 08/23/2020    HCT 24.7 08/23/2020     CMP:    Lab Results   Component Value Date     08/23/2020    K 4.2 08/23/2020    CL 93 08/23/2020    CO2 26 08/23/2020    BUN 13 08/23/2020    CREATININE 1.39 08/23/2020    GFRAA 44 08/23/2020    LABGLOM 36 08/23/2020    GLUCOSE 91 08/23/2020    GLUCOSE 88 05/29/2012    PROT 4.8 08/23/2020    LABALBU 3.2 08/23/2020    LABALBU 4.4 11/23/2011    CALCIUM 8.1 08/23/2020    BILITOT 0.56 08/23/2020    ALKPHOS 67 08/23/2020    AST 32 08/23/2020    ALT 10 08/23/2020     BMP:    Lab Results   Component Value Date     08/23/2020    K 4.2 08/23/2020    CL 93 08/23/2020    CO2 26 08/23/2020    BUN 13 08/23/2020    LABALBU 3.2 08/23/2020    LABALBU 4.4 11/23/2011    CREATININE 1.39 08/23/2020    CALCIUM 8.1 08/23/2020    GFRAA 44 08/23/2020    LABGLOM 36 08/23/2020    GLUCOSE 91 08/23/2020    GLUCOSE 88 05/29/2012     Sodium:    Lab Results   Component Value Date     08/23/2020     Potassium:    Lab Results   Component Value Date    K 4.2 08/23/2020     BUN/Creatinine:    Lab Results   Component Value Date    BUN 13 08/23/2020    CREATININE 1.39 08/23/2020     Hepatic Function Panel:    Lab Results   Component Value Date    ALKPHOS 67 08/23/2020    ALT 10 08/23/2020    AST 32 08/23/2020    PROT 4.8 08/23/2020    BILITOT 0.56 08/23/2020    LABALBU 3.2 08/23/2020    LABALBU 4.4 11/23/2011 Albumin:    Lab Results   Component Value Date    LABALBU 3.2 08/23/2020    LABALBU 4.4 11/23/2011     Calcium:    Lab Results   Component Value Date    CALCIUM 8.1 08/23/2020     Ionized Calcium:  No results found for: IONCA  Magnesium:    Lab Results   Component Value Date    MG 1.0 08/22/2020     Phosphorus:    Lab Results   Component Value Date    PHOS 3.6 08/14/2020       Lab Results   Component Value Date/Time    SPECIAL NOT REPORTED 08/14/2020 05:11 PM     Lab Results   Component Value Date/Time    CULTURE NO SIGNIFICANT GROWTH 08/14/2020 05:11 PM         Radiology:    Ct Head Wo Contrast    Result Date: 8/14/2020  EXAMINATION: CT OF THE HEAD WITHOUT CONTRAST  8/14/2020 4:59 pm TECHNIQUE: CT of the head was performed without the administration of intravenous contrast. Dose modulation, iterative reconstruction, and/or weight based adjustment of the mA/kV was utilized to reduce the radiation dose to as low as reasonably achievable. COMPARISON: 02/06/2015 HISTORY: ORDERING SYSTEM PROVIDED HISTORY: Loss of hearing with dizziness R ear for several days, please eval for lesion/mass TECHNOLOGIST PROVIDED HISTORY: Loss of hearing with dizziness R ear for several days, please eval for lesion/mass Reason for Exam: Loss of hearing with dizziness R ear for several days, please eval for lesion/mass Acuity: Acute Type of Exam: Initial Relevant Medical/Surgical History: Hx HBP controlled with meds; no hx stroke or surgery to area of interest FINDINGS: BRAIN/VENTRICLES: The ventricles and sulci are diffusely enlarged. Low attenuation is seen in the periventricular and subcortical white matter. No acute intracranial hemorrhage or acute infarct is identified. ORBITS: The visualized portion of the orbits demonstrate no acute abnormality. SINUSES: The visualized paranasal sinuses and mastoid air cells demonstrate no acute abnormality. SOFT TISSUES/SKULL:  No acute abnormality of the visualized skull or soft tissues.      No acute intracranial abnormality. Diffuse atrophic changes with findings suggesting chronic microvascular ischemia     Xr Chest Portable    Result Date: 8/14/2020  EXAMINATION: ONE XRAY VIEW OF THE CHEST 8/14/2020 4:47 pm COMPARISON: 07/09/2013 HISTORY: ORDERING SYSTEM PROVIDED HISTORY: High anion gap, malaise TECHNOLOGIST PROVIDED HISTORY: High anion gap, malaise Reason for Exam: high anion gap, malaise Acuity: Unknown Type of Exam: Unknown FINDINGS: Heart size is within normal limits. There is a moderate-sized retrocardiac hiatal hernia. Minimal blunting of the bilateral costophrenic angles. Lungs are otherwise clear. No pneumothorax. Bilateral reverse shoulder prosthesis. Prior resection of the distal right clavicle. No acute bone finding. Minimal blunting of the costophrenic angles. This may represent atelectasis and or trace pleural fluid. Lungs are otherwise clear. Moderate-sized hiatal hernia. Us Retroperitoneal Complete    Result Date: 8/15/2020  EXAMINATION: RETROPERITONEAL ULTRASOUND OF THE KIDNEYS 8/15/2020 COMPARISON: None HISTORY: ORDERING SYSTEM PROVIDED HISTORY: Acute kidney injury TECHNOLOGIST PROVIDED HISTORY: Acute kidney injury FINDINGS: Kidneys: Suboptimal evaluation. Some renal tissue is obscured. The right kidney measures 8.1 cm in length and the left kidney measures 8.2 cm in length. Kidneys are echogenic suggestive of medical renal disease. No contour deforming mass, shadowing stone or hydronephrosis. Small cyst involving the right kidney measuring 1.5 cm. Echogenic kidneys suggestive of medical renal disease. No acute process noted. Physical Examination:        Physical Exam  Vitals signs and nursing note reviewed. Constitutional:       General: She is not in acute distress. Appearance: Normal appearance. She is not ill-appearing, toxic-appearing or diaphoretic. HENT:      Head: Normocephalic and atraumatic.       Nose: Nose normal.      Mouth/Throat: Mouth: Mucous membranes are moist.   Eyes:      General: No scleral icterus. Right eye: No discharge. Left eye: No discharge. Pupils: Pupils are equal, round, and reactive to light. Cardiovascular:      Rate and Rhythm: Normal rate and regular rhythm. Pulses: Normal pulses. Heart sounds: Normal heart sounds. No murmur. No friction rub. No gallop. Pulmonary:      Effort: Pulmonary effort is normal.      Breath sounds: Normal breath sounds. Abdominal:      General: Abdomen is flat. Tenderness: There is no abdominal tenderness. Skin:     General: Skin is warm. Capillary Refill: Capillary refill takes less than 2 seconds. Coloration: Skin is not jaundiced. Neurological:      General: No focal deficit present. Mental Status: She is alert. Psychiatric:         Mood and Affect: Mood normal.           Assessment:        Primary Problem  Hyponatremia    Active Hospital Problems    Diagnosis Date Noted    Secondary hyperparathyroidism ([facility] [address] [N25.81] 08/19/2020     Priority: High    Hyponatremia [E87.1] 08/21/2020    Peripheral edema [R60.9] 08/19/2020    Anemia [D64.9] 08/12/2020    CKD (chronic kidney disease) stage 3, GFR 30-59 ml/min ([facility]) [N18.3]        Plan:        Hyponatremia, For further workup, possibly primary polydipsia (euvolemic hyponatremia) or loop diuretics (was on LASIX 20 mg orally :  - Found to have Na 116, urine Na, urine Osm, Serum Osm, total protein ordered. Glucose level normal. Volume status of the patient is normal (euvolemic hyponatremia)  -The remainder of the BMP on admission, K 3.6, HCO3 25, AG 11, GFR 37, Cl 80, Glu 93  -Cr in the last week were: 3.92, 3.49, 2.38, 1.59, Today it is 1.39 ;  Stage 3 CKD (past medical history of dialysis years ago), Had hyponatremia on last admission (126)  -IV fluids infusion with 60 ml/hr  -Nephrology consultation  -Serum Na Q 4 hours (126 (last reading) , 122, 125, 122, 118)  -urine osmolality of 179.  -Total protein 6.3  - urine osm 174  -urine Na 76  -serum osm 255  -TSH 3.3  -Cortisol am ordered     Other ongoing chronic medical conditions:  -Gout prophylaxis ----> Allopurinol 100 mg PO once daily  -hyperlipidemia ---->Atorvastatin 20 mg PO once daily  -Hypertension ---->Clonidine 0.2mg orally once daily, blood pressure readings are elevated 168/88 last reading  -Diet = Renal diet   -DVT prophylaxis: Heparin 5000 units subcu 2 times daily    Anemia  Hb 8.4  MCV 97  WBC 4.3     Iron studies last week 8/14/2020; showed iron of 177, TIBC 290, iron saturation 61   Possibly anemia secondary to CKD. B12 449  Folate 15.3    Ar Lackey MD  8/23/2020  10:26 AM     Attestation and add on       I have discussed the care of [facility] , including pertinent history and exam findings,      8/23/20    with the resident. I have seen and examined the patient and the key elements of all parts of the encounter have been performed by me . I agree with the assessment, plan and orders as documented by the resident. Principal Problem:    Hyponatremia  Active Problems:    Secondary hyperparathyroidism (HCC)    CKD (chronic kidney disease) stage 3, GFR 30-59 ml/min (HCC)    Anemia    Peripheral edema  Resolved Problems:    * No resolved hospital problems. *            ---- ;        Medications: Allergies:     Allergies   Allergen Reactions    Norvasc [Amlodipine Besylate] Other (See Comments)     Edema       Current Meds:   Scheduled Meds:    heparin (porcine)  5,000 Units Subcutaneous BID    sodium chloride flush  10 mL Intravenous 2 times per day    allopurinol  100 mg Oral Daily    aspirin  81 mg Oral Daily    atorvastatin  20 mg Oral Daily    carbamide peroxide  5 drop Otic BID    cloNIDine  0.2 mg Oral Nightly    iron polysaccharides  150 mg Oral Daily    pantoprazole  40 mg Oral QAM AC    ocuvite-lutein  1 tablet Oral Daily    neomycin-polymyxin-hydrocortisone  3 drop Right Ear 4x Daily     Continuous Infusions:    sodium chloride 60 mL/hr at 08/23/20 1048    dextrose       PRN Meds: sodium chloride flush, acetaminophen **OR** acetaminophen, polyethylene glycol, potassium chloride **OR** potassium alternative oral replacement **OR** potassium chloride, magnesium sulfate, glucose, dextrose, glucagon (rDNA), dextrose        [facility] [address]    Phone [phone]   Fax: [phone]  Answering Service: [phone]

## 2020-08-23 NOTE — CARE COORDINATION
ONGOING DISCHARGE PLAN:    Spoke with patient regarding discharge plan and patient confirms that plan is still to return to home alone w/ no needs. Pt. Denies VNS. Son & DIL are both Nurses & can assist w/ any needs. NA today 126, CR 1.39. Nephro following. No Needs per PT/OT. Will continue to follow for additional discharge needs.     Electronically signed by Kathia Polk RN on 8/23/2020 at 10:37 AM

## 2020-08-23 NOTE — PLAN OF CARE
Problem: Falls - Risk of:  Goal: Will remain free from falls  Description: Will remain free from falls  8/23/2020 0410 by Inna Townsend RN  Outcome: Ongoing     Problem: Falls - Risk of:  Goal: Absence of physical injury  Description: Absence of physical injury  8/23/2020 0410 by Inna Townsend RN  Outcome: Ongoing   Pt. remained free from falls during the shift by increased rounding as well as call light within reach.     Problem: Fluid Volume:  Goal: Hemodynamic stability will improve  Description: Hemodynamic stability will improve  8/23/2020 0410 by Inna Townsend RN  Outcome: Ongoing     Problem: Fluid Volume:  Goal: Ability to maintain a balanced intake and output will improve  Description: Ability to maintain a balanced intake and output will improve  8/23/2020 0410 by Inna Townsend RN  Outcome: Ongoing

## 2020-08-24 VITALS
SYSTOLIC BLOOD PRESSURE: 166 MMHG | RESPIRATION RATE: 16 BRPM | HEART RATE: 74 BPM | WEIGHT: 145.28 LBS | HEIGHT: 60 IN | DIASTOLIC BLOOD PRESSURE: 73 MMHG | BODY MASS INDEX: 28.52 KG/M2 | OXYGEN SATURATION: 99 % | TEMPERATURE: 98.2 F

## 2020-08-24 LAB
ABSOLUTE EOS #: 0.21 K/UL (ref 0–0.4)
ABSOLUTE IMMATURE GRANULOCYTE: ABNORMAL K/UL (ref 0–0.3)
ABSOLUTE LYMPH #: 1.19 K/UL (ref 1–4.8)
ABSOLUTE MONO #: 0.39 K/UL (ref 0.1–1.3)
ALBUMIN SERPL-MCNC: 3.3 G/DL (ref 3.5–5.2)
ALBUMIN/GLOBULIN RATIO: ABNORMAL (ref 1–2.5)
ALP BLD-CCNC: 74 U/L (ref 35–104)
ALT SERPL-CCNC: 10 U/L (ref 5–33)
ANION GAP SERPL CALCULATED.3IONS-SCNC: 8 MMOL/L (ref 9–17)
AST SERPL-CCNC: 29 U/L
BASOPHILS # BLD: 1 % (ref 0–2)
BASOPHILS ABSOLUTE: 0.04 K/UL (ref 0–0.2)
BILIRUB SERPL-MCNC: 0.53 MG/DL (ref 0.3–1.2)
BUN BLDV-MCNC: 11 MG/DL (ref 8–23)
BUN/CREAT BLD: ABNORMAL (ref 9–20)
CALCIUM SERPL-MCNC: 8.3 MG/DL (ref 8.6–10.4)
CHLORIDE BLD-SCNC: 100 MMOL/L (ref 98–107)
CO2: 24 MMOL/L (ref 20–31)
CREAT SERPL-MCNC: 1.15 MG/DL (ref 0.5–0.9)
DIFFERENTIAL TYPE: ABNORMAL
EOSINOPHILS RELATIVE PERCENT: 6 % (ref 0–4)
GFR AFRICAN AMERICAN: 55 ML/MIN
GFR NON-AFRICAN AMERICAN: 45 ML/MIN
GFR SERPL CREATININE-BSD FRML MDRD: ABNORMAL ML/MIN/{1.73_M2}
GFR SERPL CREATININE-BSD FRML MDRD: ABNORMAL ML/MIN/{1.73_M2}
GLUCOSE BLD-MCNC: 134 MG/DL (ref 70–99)
HCT VFR BLD CALC: 26.4 % (ref 36–46)
HEMOGLOBIN: 8.7 G/DL (ref 12–16)
IMMATURE GRANULOCYTES: ABNORMAL %
LYMPHOCYTES # BLD: 34 % (ref 24–44)
MAGNESIUM: 1.6 MG/DL (ref 1.6–2.6)
MCH RBC QN AUTO: 32.6 PG (ref 26–34)
MCHC RBC AUTO-ENTMCNC: 32.8 G/DL (ref 31–37)
MCV RBC AUTO: 99.3 FL (ref 80–100)
MONOCYTES # BLD: 11 % (ref 1–7)
MORPHOLOGY: NORMAL
NRBC AUTOMATED: ABNORMAL PER 100 WBC
PDW BLD-RTO: 17 % (ref 11.5–14.9)
PLATELET # BLD: 232 K/UL (ref 150–450)
PLATELET ESTIMATE: ABNORMAL
PMV BLD AUTO: 8.2 FL (ref 6–12)
POTASSIUM SERPL-SCNC: 3.5 MMOL/L (ref 3.7–5.3)
RBC # BLD: 2.66 M/UL (ref 4–5.2)
RBC # BLD: ABNORMAL 10*6/UL
SEG NEUTROPHILS: 48 % (ref 36–66)
SEGMENTED NEUTROPHILS ABSOLUTE COUNT: 1.67 K/UL (ref 1.3–9.1)
SODIUM BLD-SCNC: 127 MMOL/L (ref 135–144)
SODIUM BLD-SCNC: 128 MMOL/L (ref 135–144)
SODIUM BLD-SCNC: 132 MMOL/L (ref 135–144)
TOTAL PROTEIN: 5.3 G/DL (ref 6.4–8.3)
WBC # BLD: 3.5 K/UL (ref 3.5–11)
WBC # BLD: ABNORMAL 10*3/UL

## 2020-08-24 PROCEDURE — 36415 COLL VENOUS BLD VENIPUNCTURE: CPT

## 2020-08-24 PROCEDURE — 99239 HOSP IP/OBS DSCHRG MGMT >30: CPT | Performed by: INTERNAL MEDICINE

## 2020-08-24 PROCEDURE — 83735 ASSAY OF MAGNESIUM: CPT

## 2020-08-24 PROCEDURE — 6370000000 HC RX 637 (ALT 250 FOR IP): Performed by: STUDENT IN AN ORGANIZED HEALTH CARE EDUCATION/TRAINING PROGRAM

## 2020-08-24 PROCEDURE — 2580000003 HC RX 258: Performed by: STUDENT IN AN ORGANIZED HEALTH CARE EDUCATION/TRAINING PROGRAM

## 2020-08-24 PROCEDURE — 84295 ASSAY OF SERUM SODIUM: CPT

## 2020-08-24 PROCEDURE — 80053 COMPREHEN METABOLIC PANEL: CPT

## 2020-08-24 PROCEDURE — 6360000002 HC RX W HCPCS: Performed by: INTERNAL MEDICINE

## 2020-08-24 PROCEDURE — 85025 COMPLETE CBC W/AUTO DIFF WBC: CPT

## 2020-08-24 RX ADMIN — Medication 10 ML: at 09:40

## 2020-08-24 RX ADMIN — ALLOPURINOL 100 MG: 100 TABLET ORAL at 09:41

## 2020-08-24 RX ADMIN — HEPARIN SODIUM 5000 UNITS: 5000 INJECTION INTRAVENOUS; SUBCUTANEOUS at 09:40

## 2020-08-24 RX ADMIN — Medication 150 MG: at 09:40

## 2020-08-24 RX ADMIN — Medication 5 DROP: at 10:10

## 2020-08-24 RX ADMIN — POTASSIUM CHLORIDE 40 MEQ: 1500 TABLET, EXTENDED RELEASE ORAL at 09:40

## 2020-08-24 RX ADMIN — NEOMYCIN SULFATE, POLYMYXIN B SULFATE AND HYDROCORTISONE 3 DROP: 10; 3.5; 1 SUSPENSION/ DROPS AURICULAR (OTIC) at 14:11

## 2020-08-24 RX ADMIN — NEOMYCIN SULFATE, POLYMYXIN B SULFATE AND HYDROCORTISONE 3 DROP: 10; 3.5; 1 SUSPENSION/ DROPS AURICULAR (OTIC) at 10:09

## 2020-08-24 RX ADMIN — ASPIRIN 81 MG CHEWABLE TABLET 81 MG: 81 TABLET CHEWABLE at 09:41

## 2020-08-24 RX ADMIN — I-VITE, TAB 1000-60-2MG (60/BT) 1 TABLET: TAB at 09:41

## 2020-08-24 NOTE — PROGRESS NOTES
Nephrology Progress Note    Subjective/   81y.o. year old female who we are seeing in consultation for hyponatremia. Interval history  Patient feels better denies headache nausea vomiting or muscle cramps- serum sodium was 132 this morning , most recent SNa is 128  Patient is Asymptomatic. BP slightly elevated      History of Present Illness: This is a 81 y.o. female with a significant past medical history of Hyperlipidemia, systemic hypertension, osteoporosis and chronic kidney disease stage 3 [baseline serum creatinine 1.8 mg/dL but did require acute hemodialysis few years ago for acute kidney injury. Patient was recently admitted 1 week ago and managed for hyponatremia with serum sodium of 123 with TOYA creatinine up to 3.99. Lasix was held she received IV fluids with improvement in electrolyte normality. She was admitted through the emergency after labs drawn revealed a serum sodium of 116. She reports that she has been drinking 5 bottles of water per day. She denies any headache, confusion disorientation seizures nausea or vomiting. Lasix was also restarted on discharge last admission.   Serum  sodium initially corrected from 116 to 125 rapidly- patient was started on a hypotonic solution with 0.45 saline.       Objective/     Vitals:    08/24/20 0049 08/24/20 0500 08/24/20 0844 08/24/20 1217   BP: 104/63  (!) 153/75 (!) 166/73   Pulse: 66  73 74   Resp: 17  16 16   Temp: 97.6 °F (36.4 °C)  97.7 °F (36.5 °C) 98.2 °F (36.8 °C)   TempSrc: Oral  Oral Oral   SpO2: 99%  100% 99%   Weight:  145 lb 4.5 oz (65.9 kg)     Height:         24HR INTAKE/OUTPUT:      Intake/Output Summary (Last 24 hours) at 8/24/2020 1753  Last data filed at 8/24/2020 0800  Gross per 24 hour   Intake 1118 ml   Output --   Net 1118 ml     Patient Vitals for the past 96 hrs (Last 3 readings):   Weight   08/24/20 0500 145 lb 4.5 oz (65.9 kg)   08/23/20 0515 144 lb 10 oz (65.6 kg)   08/21/20 1138 146 lb (66.2 kg) Constitutional:  Alert, awake, no apparent distress  Cardiovascular:  S1, S2 without m/r/g  Respiratory:  CTA B without w/r/r  Abdomen: +bs, soft, nt  Ext: LE edema    Data/  Recent Labs     08/22/20  0616 08/23/20  0531 08/24/20  0835   WBC 4.0 4.3 3.5   HGB 9.2* 8.4* 8.7*   HCT 27.0* 24.7* 26.4*   MCV 97.4 97.4 99.3    209 232     Recent Labs     08/22/20  0616  08/23/20  0531  08/23/20  1343  08/24/20  0151 08/24/20  0835 08/24/20  1409   *   < > 126*   < > 127*   < > 127* 132* 128*   K 3.5*  --  4.2  --   --   --   --  3.5*  --    CL 90*  --  93*  --   --   --   --  100  --    CO2 24  --  26  --   --   --   --  24  --    GLUCOSE 85  --  91  --   --   --   --  134*  --    MG 1.0*  --   --   --  1.9  --   --  1.6  --    BUN 15  --  13  --   --   --   --  11  --    CREATININE 1.34*  --  1.39*  --   --   --   --  1.15*  --    LABGLOM 38*  --  36*  --   --   --   --  45*  --    GFRAA 46*  --  44*  --   --   --   --  55*  --     < > = values in this interval not displayed. Assessment:  1. Hypo-natremia with hypo-osmolarity and clinical euvolemia secondary to excessive water intake/polydipsia + loop diuretic. Patient has low urine osmolarity of 138 and high urine sodium likely reflective of recent diuretic use. Serum sodium 128 satifactory  TSH within normal-cortisol level borderline at 6        2. Chronic kidney disease stage III secondary to nephrosclerosis.     3.  Recent acute kidney injury-resolving -stable renal function of 1.3        4. Essential hypertension-clonidine 0.2 mg at night. Plan  SNa stable, ok for discharge  Fluid restriction less than 1500 ml/day  F/u out patient.   LESA in 3 days.          Say

## 2020-08-24 NOTE — PLAN OF CARE
Problem: Falls - Risk of:  Goal: Will remain free from falls  Description: Will remain free from falls  8/24/2020 1356 by Latricia Cowden  Outcome: Ongoing  Note: Free from falls, bed in lowest position, call light within reach, non-skid socks on  8/24/2020 0425 by Roseann Bond RN  Outcome: Ongoing  Goal: Absence of physical injury  Description: Absence of physical injury  8/24/2020 1356 by Latricia Cowden  Outcome: Ongoing  8/24/2020 0425 by Roseann Bond RN  Outcome: Ongoing     Problem: Fluid Volume:  Goal: Hemodynamic stability will improve  Description: Hemodynamic stability will improve  8/24/2020 1356 by Latricia Cowden  Outcome: Ongoing  8/24/2020 0425 by Roseann Bond RN  Outcome: Ongoing  Goal: Ability to maintain a balanced intake and output will improve  Description: Ability to maintain a balanced intake and output will improve  8/24/2020 1356 by Latricia Cowden  Outcome: Ongoing  8/24/2020 0425 by Roseann Bond RN  Outcome: Ongoing

## 2020-08-24 NOTE — PROGRESS NOTES
[address] [facility].    PROGRESS NOTE             8/24/2020    9:06 AM    Name:   Marjorie Calderón  MRN:     [id number]     Acct:      [de-identified]   Room:   2083/2083-01   Day:  3  Admit Date:  8/21/2020 12:44 PM    PCP:  Elton Duong MD  Code Status:  Full Code    Subjective:     C/C:   Chief Complaint   Patient presents with    Abnormal Lab     Sodium 116     Interval History Status: improved. Patient seen and examined at the bed side, no new acute events overnight no new complaints. No weakness, lethargy or malaise, no headache, no change in mental status. Notes from nursing staff and Consults had been reviewed, and the overnight progress had been checked with the nursing staff as well. Brief History:     Please see H&P. Review of Systems:     CONSTITUTIONAL:  no fevers  EYES: negative for blury vision  HEENT: No headaches, no nasal congestion, no difficulty swallowing  RESPIRATORY:negative for dyspnea, no wheezing, no Cough  CARDIOVASCULAR: negative for chest pain, no palpitations  GASTROINTESTINAL: no nausea, no vomiting, no change in bowel habits, no abdominal pain   GENITOURINARY: negative for dysuria, no hematuria   MUSCULOSKELETAL: no joint pains, no muscle aches, no swelling of joints or extremities  NEUROLOGICAL: No weakness or numbness      Medications: Allergies:     Allergies   Allergen Reactions    Norvasc [Amlodipine Besylate] Other (See Comments)     Edema       Current Meds:   Scheduled Meds:    heparin (porcine)  5,000 Units Subcutaneous BID    sodium chloride flush  10 mL Intravenous 2 times per day    allopurinol  100 mg Oral Daily    aspirin  81 mg Oral Daily    atorvastatin  20 mg Oral Daily    carbamide peroxide  5 drop Otic BID    cloNIDine  0.2 mg Oral Nightly    iron polysaccharides  150 mg Oral Daily    pantoprazole  40 mg Oral QAM AC    ocuvite-lutein  1 tablet Oral Daily    neomycin-polymyxin-hydrocortisone  3 drop Right Ear 4x Daily     Continuous Infusions:    sodium chloride 60 mL/hr at 20 1048    dextrose       PRN Meds: sodium chloride flush, acetaminophen **OR** acetaminophen, polyethylene glycol, potassium chloride **OR** potassium alternative oral replacement **OR** potassium chloride, magnesium sulfate, glucose, dextrose, glucagon (rDNA), dextrose    Data:     Past Medical History:   has a past medical history of CKD (chronic kidney disease) stage 3, GFR 30-59 ml/min ([facility]), Depression, GERD (gastroesophageal reflux disease), Gout, Hyperlipidemia, Hypertension, Hyponatremia, Metabolic acidosis, Osteoporosis, and Secondary hyperparathyroidism ([address] Social History:   reports that she has never smoked. She has never used smokeless tobacco. She reports current alcohol use of about 2.0 standard drinks of alcohol per week. She reports that she does not use drugs. Family History:   Family History   Problem Relation Age of Onset   Authur Travon Cancer Mother         breast cancer    Emphysema Mother     Cancer Father         Prostate cancer    Heart Disease Maternal Grandfather     Heart Disease Paternal Grandfather        Vitals:  BP (!) 153/75   Pulse 73   Temp 97.7 °F (36.5 °C) (Oral)   Resp 16   Ht 5' (1.524 m)   Wt 145 lb 4.5 oz (65.9 kg)   SpO2 100%   BMI 28.37 kg/m²   Temp (24hrs), Av °F (36.7 °C), Min:97.6 °F (36.4 °C), Max:98.4 °F (36.9 °C)      No results for input(s): POCGLU in the last 72 hours. I/O(24Hr):     Intake/Output Summary (Last 24 hours) at 2020 0906  Last data filed at 2020 0514  Gross per 24 hour   Intake 878 ml   Output --   Net 878 ml       Labs:    CBC:   Lab Results   Component Value Date    WBC 3.5 2020    RBC 2.66 2020    RBC 3.62 2012    HGB 8.7 2020    HCT 26.4 2020    MCV 99.3 2020    MCH 32.6 2020    MCHC 32.8 2020    RDW 17.0 2020     2020     05/29/2012    MPV 8.2 08/24/2020     CBC with Differential:    Lab Results   Component Value Date    WBC 3.5 08/24/2020    RBC 2.66 08/24/2020    RBC 3.62 05/29/2012    HGB 8.7 08/24/2020    HCT 26.4 08/24/2020     08/24/2020     05/29/2012    MCV 99.3 08/24/2020    MCH 32.6 08/24/2020    MCHC 32.8 08/24/2020    RDW 17.0 08/24/2020    LYMPHOPCT 34 08/24/2020    MONOPCT 11 08/24/2020    BASOPCT 1 08/24/2020    MONOSABS 0.39 08/24/2020    LYMPHSABS 1.19 08/24/2020    EOSABS 0.21 08/24/2020    BASOSABS 0.04 08/24/2020    DIFFTYPE NOT REPORTED 08/24/2020     WBC:    Lab Results   Component Value Date    WBC 3.5 08/24/2020     Platelets:    Lab Results   Component Value Date     08/24/2020     05/29/2012     Hemoglobin/Hematocrit:    Lab Results   Component Value Date    HGB 8.7 08/24/2020    HCT 26.4 08/24/2020     CMP:    Lab Results   Component Value Date     08/24/2020    K 4.2 08/23/2020    CL 93 08/23/2020    CO2 26 08/23/2020    BUN 13 08/23/2020    CREATININE 1.39 08/23/2020    GFRAA 44 08/23/2020    LABGLOM 36 08/23/2020    GLUCOSE 91 08/23/2020    GLUCOSE 88 05/29/2012    PROT 4.8 08/23/2020    LABALBU 3.2 08/23/2020    LABALBU 4.4 11/23/2011    CALCIUM 8.1 08/23/2020    BILITOT 0.56 08/23/2020    ALKPHOS 67 08/23/2020    AST 32 08/23/2020    ALT 10 08/23/2020     BMP:    Lab Results   Component Value Date     08/24/2020    K 4.2 08/23/2020    CL 93 08/23/2020    CO2 26 08/23/2020    BUN 13 08/23/2020    LABALBU 3.2 08/23/2020    LABALBU 4.4 11/23/2011    CREATININE 1.39 08/23/2020    CALCIUM 8.1 08/23/2020    GFRAA 44 08/23/2020    LABGLOM 36 08/23/2020    GLUCOSE 91 08/23/2020    GLUCOSE 88 05/29/2012     Sodium:    Lab Results   Component Value Date     08/24/2020     Potassium:    Lab Results   Component Value Date    K 4.2 08/23/2020     BUN/Creatinine:    Lab Results   Component Value Date    BUN 13 08/23/2020    CREATININE 1.39 08/23/2020     Hepatic Function Panel:    Lab Results   Component Value Date    ALKPHOS 67 08/23/2020    ALT 10 08/23/2020    AST 32 08/23/2020    PROT 4.8 08/23/2020    BILITOT 0.56 08/23/2020    LABALBU 3.2 08/23/2020    LABALBU 4.4 11/23/2011     Albumin:    Lab Results   Component Value Date    LABALBU 3.2 08/23/2020    LABALBU 4.4 11/23/2011     Calcium:    Lab Results   Component Value Date    CALCIUM 8.1 08/23/2020       Lab Results   Component Value Date/Time    SPECIAL NOT REPORTED 08/14/2020 05:11 PM     Lab Results   Component Value Date/Time    CULTURE NO SIGNIFICANT GROWTH 08/14/2020 05:11 PM         Radiology:    No results found. Physical Examination:        PHYSICAL EXAM:  General Appearance  Alert , awake, not in acute distress  HEENT - Head is normocephalic, atraumatic. Lungs - Bilateral equal air entry, no wheezes, rales or rhonchi, aeration good  Cardiovascular - Heart sounds are normal.  Regular rhythm, normal rate without murmur, gallop or rub. Abdomen - Soft, nontender, nondistended, no masses or organomegaly  Neurologic - There are no new focal motor or sensory deficits  Skin - No bruising or bleeding on exposed skin area  Extremities - No cyanosis, clubbing or edema (improved compared to the time of presentation)      Assessment:        Primary Problem  Hyponatremia    Active Hospital Problems    Diagnosis Date Noted    [facility] [N25.81] 08/19/2020     Priority: High    Hyponatremia [E87.1] 08/21/2020    Peripheral edema [R60.9] 08/19/2020    Anemia [D64.9] 08/12/2020    CKD (chronic kidney disease) stage 3, GFR 30-59 ml/min ([facility]) [N18.3]        Plan:        Hyponatremia, For further workup, possibly primary polydipsia (euvolemic hyponatremia) or loop diuretics (was on LASIX 20 mg orally) :  - Found to have Na 116, urine Na, urine Osm, Serum Osm, total protein ordered.  Glucose level normal. Volume status of the patient is normal (euvolemic hyponatremia)  -The remainder of the BMP on admission, K 3.6, HCO3 25, AG 11, GFR 37, Cl 80, Glu 93  -Cr in the last week were: 3.92, 3.49, 2.38, 1.59, Today it is 1.39 ; Stage 3 CKD (past medical history of dialysis years ago), Had hyponatremia on last admission (126)  -IV fluids infusion with 60 ml/hr  -Nephrology consultation  -Serum Na Q 4 hours (126 (last reading) , 128, 127,122, 125, 122,   -urine osmolality of 179.  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 B12 449  Folate 15.3    DVT prophylaxis: heparin (porcine) injection 5,000 TID  GI prophylaxis: Protonix 40 mg daily    Gustabo Ramírez MD  8/24/2020  9:06 AM     Attending Physician Statement  I have discussed the care of [facility] and I have examined the patient myselft and taken ros and hpi , including pertinent history and exam findings,  with the resident. I have reviewed the key elements of all parts of the encounter with the resident. I agree with the assessment, plan and orders as documented by the resident.     Hyponatremia severe on admission 116 likely secondary to diuretics and polydipsia Lasix held fluid restricted sodium improved nephrology input noted and appreciated nephrology to review clonidine as a blood pressure meds may consider a service as an outpatient discharge planning today if okay with nephrology  Electronically signed by Susana Banda MD

## 2020-08-25 ENCOUNTER — TELEPHONE (OUTPATIENT)
Dept: FAMILY MEDICINE CLINIC | Age: 85
End: 2020-08-25

## 2020-08-25 ENCOUNTER — CARE COORDINATION (OUTPATIENT)
Dept: CASE MANAGEMENT | Age: 85
End: 2020-08-25

## 2020-08-25 PROCEDURE — 1111F DSCHRG MED/CURRENT MED MERGE: CPT

## 2020-08-25 NOTE — TELEPHONE ENCOUNTER
[facility] Transitions Initial Follow Up Call    Outreach made within 2 business days of discharge: Yes    Patient: Saji Espitia Patient : 1935   MRN: [id number]  Reason for Admission: There are no discharge diagnoses documented for the most recent discharge. Discharge Date: 20       Spoke with: Rita Prieto    Discharge department/facility: [place] [address] Interactive Patient Contact:  Was patient able to fill all prescriptions: Yes  Was patient instructed to bring all medications to the follow-up visit: Yes  Is patient taking all medications as directed in the discharge summary?  Yes  Does patient understand their discharge instructions: Yes  Does patient have questions or concerns that need addressed prior to 7-14 day follow up office visit: no    Scheduled appointment with PCP within 7-14 days    Follow Up  Future Appointments   Date Time Provider Eric Vizcaino   2020  1:30 PM ELIZABET Mills - CNP [facility]   2020  2:00 PM Sharmin Merino MD fp [facility] [address], [place]

## 2020-08-25 NOTE — CARE COORDINATION
[facility] [facility] Transitions Initial Follow Up Call    Call within 2 business days of discharge: Yes    Patient: Chriss Worthy Patient : 1935   MRN: [id number]  Reason for Admission: hyponatremia  Discharge Date: 20 RARS: Readmission Risk Score: 21      Last Discharge [facility]       Complaint Diagnosis Description Type Department Provider    20 Abnormal Lab Hyponatremia ED to Hosp-Admission (Discharged) (ADMITTED) Ernst Del Valle MD; Jake Croado. .. Spoke with: [facility]: [facility]    Non-face-to-face services provided:  Obtained and reviewed discharge summary and/or continuity of care documents  Assessment and support for treatment adherence and medication management-reviewed dsicharge instructions and medications, 1111F order completed, reviewed BPCI-A medicare Benefit  Writer spoke to patient, she stated she is feeling much better, no new needs or concerns at this time. Reviewed discharge instructions and medications, 1111F order completed.  Reviewed BPCI-A Medicare Benefit, patient v/u, will forward on To CTN following//JU    Care Transitions 24 Hour Call    Do you have any ongoing symptoms?:  No  Do you have a copy of your discharge instructions?:  Yes  Do you have all of your prescriptions and are they filled?:  Yes  Have you scheduled your follow up appointment?:  Yes  How are you going to get to your appointment?:  Car - family or friend to transport  Were you discharged with any Home Care or Post Acute Services:  No  Patient DME:  Garth Libman, Straight cane, Shower chair  Do you have support at home?:  Alone  Do you feel like you have everything you need to keep you well at home?:  Yes  Are you an active caregiver in your home?:  No  Care Transitions Interventions         Follow Up  Future Appointments   Date Time Provider Eric Vizcaino   2020  1:30 PM [facility]   2020  2:00 PM Abigail Gordillo MD [facility] Mendez Humphriesies, RN

## 2020-08-26 ENCOUNTER — TELEPHONE (OUTPATIENT)
Dept: FAMILY MEDICINE CLINIC | Age: 85
End: 2020-08-26

## 2020-08-26 ENCOUNTER — OFFICE VISIT (OUTPATIENT)
Dept: FAMILY MEDICINE CLINIC | Age: 85
End: 2020-08-26
Payer: MEDICARE

## 2020-08-26 VITALS
WEIGHT: 146 LBS | DIASTOLIC BLOOD PRESSURE: 70 MMHG | BODY MASS INDEX: 28.66 KG/M2 | HEIGHT: 60 IN | TEMPERATURE: 98.4 F | SYSTOLIC BLOOD PRESSURE: 130 MMHG | OXYGEN SATURATION: 98 % | HEART RATE: 89 BPM

## 2020-08-26 PROBLEM — E87.6 HYPOKALEMIA: Status: ACTIVE | Noted: 2020-08-26

## 2020-08-26 PROCEDURE — 1111F DSCHRG MED/CURRENT MED MERGE: CPT | Performed by: FAMILY MEDICINE

## 2020-08-26 PROCEDURE — 99495 TRANSJ CARE MGMT MOD F2F 14D: CPT | Performed by: FAMILY MEDICINE

## 2020-08-26 ASSESSMENT — ENCOUNTER SYMPTOMS
RESPIRATORY NEGATIVE: 1
COUGH: 0
ABDOMINAL PAIN: 0
SHORTNESS OF BREATH: 0

## 2020-08-26 NOTE — PROGRESS NOTES
[facility]  [facility] [address] [place]  [facility] Servin [facility].  [address] [address]  Dept: [phone]    Post-Discharge Transitional Care Management Services or Hospital Follow Up      Stacia Dominique   YOB: 1935    Date of Office Visit:  8/26/2020  Date of Hospital Admission: 8/21/20  Date of Hospital Discharge: 8/24/20  Readmission Risk Score(high >=14%.  Medium >=10%):Readmission Risk Score: 21      Care management risk score Rising risk (score 2-5) and Complex Care (Scores >=6): 5     Non face to face  following discharge, date last encounter closed (first attempt may have been earlier): 8/25/2020  4:34 PM 8/25/2020  4:34 PM    Call initiated 2 business days of discharge: Yes     Patient Active Problem List   Diagnosis    Osteoporosis    Idiopathic chronic gout of left foot without tophus    Hyperlipidemia    Hiatal hernia    Gastroesophageal reflux disease without esophagitis    Gout    Benign hypertension with CKD (chronic kidney disease) stage III ([facility])    CKD (chronic kidney disease) stage 3, GFR 30-59 ml/min ([facility])    Acute ear pain, right    Hyperglycemia    Anemia    Acute kidney injury superimposed on chronic kidney disease (HCC)    Significant drop in hemoglobin ([facility])    Acute renal failure superimposed on chronic kidney disease ([facility]    Impacted cerumen of right ear    Secondary hyperparathyroidism ([facility]    Peripheral edema    Hyponatremia    Hypokalemia       Allergies   Allergen Reactions    Norvasc [Amlodipine Besylate] Other (See Comments)     Edema       Medications listed as ordered at the time of discharge from hospital   [facility] Medication Instructions STONEY:    Printed on:08/26/20 1919   Medication Information                      allopurinol (ZYLOPRIM) 100 MG tablet  Take 100 mg by mouth daily              aspirin 81 MG tablet  Take 1 tablet by mouth daily             atorvastatin (LIPITOR) 20 MG tablet  Take 1 tablet by mouth daily             cloNIDine (CATAPRES) 0.2 MG tablet  Take 0.2 mg by mouth nightly             iron polysaccharides (NIFEREX) 150 MG capsule  Take 1 capsule by mouth daily             Multiple Vitamins-Minerals (OCUVITE ADULT 50+ PO)  Take 1 tablet by mouth daily             neomycin-polymyxin-hydrocortisone (CORTISPORIN) 3.5-70272-2 otic suspension  Place 3 drops into the right ear 4 times daily For 10 days; Started 8/13/2020             omeprazole (PRILOSEC) 20 MG delayed release capsule  Take 20 mg by mouth daily                   Medications marked \"taking\" at this time  Outpatient Medications Marked as Taking for the 8/26/20 encounter (Office Visit) with ELIZABET Koenig - CNP   Medication Sig Dispense Refill    cloNIDine (CATAPRES) 0.2 MG tablet Take 0.2 mg by mouth nightly      iron polysaccharides (NIFEREX) 150 MG capsule Take 1 capsule by mouth daily 60 capsule 3    omeprazole (PRILOSEC) 20 MG delayed release capsule Take 20 mg by mouth daily      neomycin-polymyxin-hydrocortisone (CORTISPORIN) 3.5-58858-0 otic suspension Place 3 drops into the right ear 4 times daily For 10 days; Started 8/13/2020      allopurinol (ZYLOPRIM) 100 MG tablet Take 100 mg by mouth daily       atorvastatin (LIPITOR) 20 MG tablet Take 1 tablet by mouth daily 90 tablet 1    aspirin 81 MG tablet Take 1 tablet by mouth daily 90 tablet 1    Multiple Vitamins-Minerals (OCUVITE ADULT 50+ PO) Take 1 tablet by mouth daily          Medications patient taking as of now reconciled against medications ordered at time of hospital discharge: Yes    Chief Complaint   Patient presents with    Follow[facility] f/u [facility]     Leg Swelling     elevation not helping        HPI    Inpatient course: Discharge summary reviewed- see chart. Interval history/Current status:Anita  is an established patient of Dr. Margarette Hastings.  Patient has a history of acute renal failure,  CKD stage III, significant drop in hemoglobin, secondary hyperparathyroidism,    Patient went home and started to have some sensation of imbalance and fatigue. Patient repeat BMP was done and was found to have a sodium level of 116. Patient was then instructed to go back to hospital.  Patient was admitted to the hospital.  Patient had some IV fluids. Lasix was discontinued. Patient was sent home with a sodium of 128. Patient reports feeling better. Although, she still feels fatigue. She is limited to 1500 mL of fluid intake per day. She is sticking to this guideline. Patient reports elevated blood pressure at home with a systolic of 1 7621 at a time. She denies any chest pain, palpitations, lightheadedness, shortness of breath. Patient had an inadvertent discontinuation of clonidine from the hospital.    Patient's does not have an appointment with the nephrology services. Appointment was facilitated by the office today so that she can see them sooner rather than later. Since patient stopped the Lasix she complains of some swelling on both of her legs this is not a new problem for her but since he stopped her Lasix her edema is worse ended. Patient lives alone. Patient does well. She declined to have any home health services. She does have a family members that checks on her once in a while. She is able to drive herself to the office today. [address] [place] [address] has a well controlled hypertension. she is currently on Clonidine. Patient's most recent BP in the office was stable. she reports stable BP readings at home. Patient denies any adverse reaction to this therapy. she denies any CP, SOB, HA, or palpitations.   Tatum Redman  Lab Results   Component Value Date/Time    CORTISOL 6.1 08/23/2020 05:31 AM     CREATININE   Date Value Ref Range Status   08/24/2020 1.15 (H) 0.50 - 0.90 mg/dL Final   08/23/2020 1.39 (H) 0.50 - 0.90 mg/dL Final   08/22/2020 1.34 (H) 0.50 - 0.90 mg/dL Final 08/21/2020 1.37 (H) 0.50 - 0.90 mg/dL Final   08/17/2020 1.59 (H) 0.50 - 0.90 mg/dL Final     Sodium   Date Value Ref Range Status   08/24/2020 128 (L) 135 - 144 mmol/L Final   08/24/2020 132 (L) 135 - 144 mmol/L Final   08/24/2020 127 (L) 135 - 144 mmol/L Final   08/23/2020 128 (L) 135 - 144 mmol/L Final   08/23/2020 127 (L) 135 - 144 mmol/L Final     Potassium   Date Value Ref Range Status   08/24/2020 3.5 (L) 3.7 - 5.3 mmol/L Final   08/23/2020 4.2 3.7 - 5.3 mmol/L Final   08/22/2020 3.5 (L) 3.7 - 5.3 mmol/L Final   08/21/2020 3.6 (L) 3.7 - 5.3 mmol/L Final   08/17/2020 4.0 3.7 - 5.3 mmol/L Final     Magnesium   Date Value Ref Range Status   08/24/2020 1.6 1.6 - 2.6 mg/dL Final   08/23/2020 1.9 1.6 - 2.6 mg/dL Final   08/22/2020 1.0 (L) 1.6 - 2.6 mg/dL Final   08/16/2020 1.5 (L) 1.6 - 2.6 mg/dL Final     Review of Systems   Constitutional: Positive for fatigue. Negative for chills and fever. HENT: Negative. Respiratory: Negative. Negative for cough and shortness of breath. Cardiovascular: Positive for leg swelling. Negative for chest pain and palpitations. Gastrointestinal: Negative for abdominal pain. Early satiety   Genitourinary: Negative for dysuria, flank pain, frequency, hematuria, pelvic pain and urgency. Musculoskeletal: Negative for arthralgias and myalgias. Skin: Negative for rash. Neurological: Negative for light-headedness and headaches. Hematological: Bruises/bleeds easily. Psychiatric/Behavioral: Negative for sleep disturbance. The patient is nervous/anxious. Vitals:    08/26/20 1321   BP: 130/70   Pulse: 89   Temp: 98.4 °F (36.9 °C)   SpO2: 98%   Weight: 146 lb (66.2 kg)   Height: 5' (1.524 m)     Body mass index is 28.51 kg/m².    Wt Readings from Last 3 Encounters:   08/26/20 146 lb (66.2 kg)   08/24/20 145 lb 4.5 oz (65.9 kg)   08/19/20 146 lb (66.2 kg)     BP Readings from Last 3 Encounters:   08/26/20 130/70   08/24/20 (!) 166/73   08/19/20 130/80 Lab Results   Component Value Date    ALT 10 08/24/2020    AST 29 08/24/2020    ALKPHOS 74 08/24/2020    BILITOT 0.53 08/24/2020     Lab Results   Component Value Date    TSH 3.31 08/23/2020     Lab Results   Component Value Date    CHOL 179 10/14/2019    CHOL 138 06/18/2018    CHOL 147 08/26/2017     Lab Results   Component Value Date    TRIG 73 10/14/2019    TRIG 65 06/18/2018    TRIG 82 08/26/2017     Lab Results   Component Value Date     10/14/2019    HDL 64 06/18/2018     08/26/2017     Lab Results   Component Value Date    LDLCHOLESTEROL 10 10/14/2019    LDLCHOLESTEROL 61 06/18/2018    LDLCHOLESTEROL 24 08/26/2017     Lab Results   Component Value Date    VLDL NOT REPORTED 10/14/2019    VLDL NOT REPORTED 06/18/2018    VLDL NOT REPORTED 08/26/2017     Lab Results   Component Value Date    CHOLHDLRATIO 1.2 10/14/2019    CHOLHDLRATIO 2.2 06/18/2018    CHOLHDLRATIO 1.4 08/26/2017     Lab Results   Component Value Date    LABA1C 4.9 08/14/2020     Lab Results   Component Value Date    QCAKXZLA78 449 08/14/2020     Lab Results   Component Value Date    FOLATE 15.3 08/14/2020     Lab Results   Component Value Date    IRON 177 (H) 08/14/2020    TIBC 290 08/14/2020    FERRITIN 136 08/14/2020     Lab Results   Component Value Date    VITD25 46.2 08/14/2020     US RETROPERITONEAL COMPLETE  Narrative: EXAMINATION:  RETROPERITONEAL ULTRASOUND OF THE KIDNEYS    8/15/2020    COMPARISON:  None    HISTORY:  ORDERING SYSTEM PROVIDED HISTORY: Acute kidney injury  TECHNOLOGIST PROVIDED HISTORY:  Acute kidney injury    FINDINGS:    Kidneys:    Suboptimal evaluation. Some renal tissue is obscured. The right kidney measures 8.1 cm in length and the left kidney measures 8.2  cm in length. Kidneys are echogenic suggestive of medical renal disease. No contour  deforming mass, shadowing stone or hydronephrosis. Small cyst involving the  right kidney measuring 1.5 cm.   Impression: Echogenic kidneys suggestive of medical renal disease. No acute process  noted. Assessment/Plan:  1. Acute renal failure superimposed on stage 3 chronic kidney disease, unspecified acute renal failure type (HCC)  Failure to Improve  Repeat BMP  Appt with nephrology  Fluid intake limited to 1500 ml. Per nephrology    - [place] DISCHARGE MEDS RECONCILED W/ CURRENT OUTPATIENT MED LIST  - Comprehensive Metabolic Panel; Future    2. Significant drop in hemoglobin (HCC)  Failure to Improve  On Iron supplements  Hematology referral    - TN DISCHARGE MEDS RECONCILED W/ CURRENT OUTPATIENT MED LIST  - Ambulatory referral to Hematology Oncology    3. CKD (chronic kidney disease) stage 3, GFR 30-59 ml/min (HCC)  Failure to Improve  Repeat BMP  Appt with nephrology  Fluid intake limited to 1500 ml. Per nephrology  - TN DISCHARGE MEDS RECONCILED W/ CURRENT OUTPATIENT MED LIST  - Comprehensive Metabolic Panel; Future    4. Benign hypertension with CKD (chronic kidney disease) stage III (HCC)  Failure to Improve  Re start Clonidine  DISCUSSED AND ADVISED TO:  Cut down on caffeinated drinks, sports drinks. Instructed to check BP at home regularly. Report for any chest pains, shortness of breath, headaches, and lightheadedness. Call the office if your blood pressure continue to be higher than 140/90 or 90/50.    - TN DISCHARGE MEDS RECONCILED W/ CURRENT OUTPATIENT MED LIST  - Comprehensive Metabolic Panel; Future    5. Secondary hyperparathyroidism ([facility] [address]  Cortisol normal    - TN DISCHARGE MEDS RECONCILED W/ CURRENT OUTPATIENT MED LIST    6. Hyponatremia  Failure to Improve  Recheck BMP  - Comprehensive Metabolic Panel; Future    - TN DISCHARGE MEDS RECONCILED W/ CURRENT OUTPATIENT MED LIST    7.  Hypokalemia  Failure to Improve  Recheck BMP  - TN DISCHARGE MEDS RECONCILED W/ CURRENT OUTPATIENT MED LIST        Medical Decision Making: moderate complexity      This note was completed by using the assistance of a speech-recognition program. However, inadvertent computerized transcription errors may be present. Although every effort was made to ensure accuracy, no guarantees can be provided that every mistake has been identified and corrected by editing.   Electronically signed by ELIZABET Castellon CNP on 8/26/20 at 1:43 PM EDT

## 2020-08-26 NOTE — PROGRESS NOTES
Cortisol level has to be around 10, 6.1 is low  In stress situations like acute illness should be higher    Please repeat it.  Low cortisol can be a cause of low sodium, that is why it was checked        Component  Value  Ref Range & Units  Status  Collected  Lab    Cortisol  6.1  2.7 - 18.4 ug/dL  Final  08/23/2020  5:31 AM  [address]

## 2020-08-26 NOTE — PROGRESS NOTES
Visit Information    Have you changed or started any medications since your last visit including any over-the-counter medicines, vitamins, or herbal medicines? no   Are you having any side effects from any of your medications? -  no  Have you stopped taking any of your medications? Is so, why? -  no    Have you seen any other physician or provider since your last visit? Yes - Records Obtained  Have you had any other diagnostic tests since your last visit? Yes - Records Obtained  Have you been seen in the emergency room and/or had an admission to a hospital since we last saw you? Yes - Records Obtained  Have you had your routine dental cleaning in the past 6 months? no    Have you activated your [employer] account? If not, what are your barriers?  Yes     Patient Care Team:  Letha Townsend MD as PCP - General (Family Medicine)  L[facility]a [facility]send [facility]  Daryle Gamble, RN as Care Transitions Nurse    Medical History Review  Past Medical, Family, and Social History reviewed and does contribute to the patient presenting condition    Health Maintenance   Topic Date Due    DTaP/Tdap/Td vaccine (1 - Tdap) 11/29/1954    Shingles Vaccine (1 of 2) 11/29/1986    Annual Wellness Visit (AWV)  06/20/2019    Flu vaccine (1) 09/01/2020    Lipid screen  10/14/2020    Potassium monitoring  08/24/2021    Creatinine monitoring  08/24/2021    DEXA (modify frequency per FRAX score)  Completed    Pneumococcal 65+ years Vaccine  Completed    Hepatitis A vaccine  Aged Out    Hepatitis B vaccine  Aged Out    Hib vaccine  Aged Out    Meningococcal (ACWY) vaccine  Aged Out

## 2020-08-26 NOTE — Clinical Note
Cortisol was normal on her. What else do u recommend? She stopped her Clonidine. Her BP was up at home. I re started that. Lasix was stopped.

## 2020-08-26 NOTE — PATIENT INSTRUCTIONS
hyponatPatient Education        Hyponatremia: Care Instructions  Your Care Instructions     Hyponatremia (say \"[id number]-[id number]-[id number]-tito-uh\") means that you don't have enough sodium in your blood. It can cause nausea, vomiting, and headaches. Or you may not feel hungry. In serious cases, it can cause seizures, a coma, or even death. Hyponatremia is not a disease. It is a problem caused by something else, such as medicines or exercising for a long time in hot weather. You can get hyponatremia if you lose a lot of fluids and then you drink a lot of water or other liquids that don't have much sodium. You can also get it if you have kidney, liver, heart, or other health problems. Treatment is focused on getting your sodium levels back to normal.  Follow-up care is a key part of your treatment and safety. Be sure to make and go to all appointments, and call your doctor if you are having problems. It's also a good idea to know your test results and keep a list of the medicines you take. How can you care for yourself at home? · If your doctor recommends it, drink fluids that have sodium. Sports drinks are a good choice. Or you can eat salty foods. · If your doctor recommends it, limit the amount of water you drink. And limit fluids that are mostly water. These include tea, coffee, and juice. · Take your medicines exactly as prescribed. Call your doctor if you have any problems with your medicine. · Get your sodium levels tested when your doctor tells you to. When should you call for help? [id number] anytime you think you may need emergency care. For example, call if:  · You have a seizure. · You passed out (lost consciousness). Call your doctor now or seek immediate medical care if:  · You are confused or it is hard to focus. · You have little or no appetite. · You feel sick to your stomach or you vomit. · You have a headache. · You have mood changes.   · You feel more tired than usual.  Watch closely for changes in your health, and be sure to contact your doctor if:  · You do not get better as expected. Where can you learn more? Go to https://[email or web address].[email or web address] org and sign in to your [employer] account. Enter H045 in the Aviir box to learn more about \"Hyponatremia: Care Instructions. \"     If you do not have an account, please click on the \"Sign Up Now\" link. Current as of: December 9, 2019               Content Version: 12.5  © 4177-9457 Healthwise, Incorporated. Care instructions adapted under license by [facility] ([facility]). If you have questions about a medical condition or this instruction, always ask your healthcare professional. [address] any warranty or liability for your use of this information.

## 2020-08-27 ENCOUNTER — HOSPITAL ENCOUNTER (OUTPATIENT)
Age: 85
Discharge: HOME | End: 2020-08-27
Payer: MEDICARE

## 2020-08-27 LAB
ALBUMIN SERPL-MCNC: 3.8 G/DL (ref 3.5–5.2)
ALBUMIN/GLOBULIN RATIO: ABNORMAL (ref 1–2.5)
ALP BLD-CCNC: 86 U/L (ref 35–104)
ALT SERPL-CCNC: 10 U/L (ref 5–33)
ANION GAP SERPL CALCULATED.3IONS-SCNC: 10 MMOL/L (ref 9–17)
AST SERPL-CCNC: 28 U/L
BILIRUB SERPL-MCNC: 0.6 MG/DL (ref 0.3–1.2)
BUN BLDV-MCNC: 16 MG/DL (ref 8–23)
BUN/CREAT BLD: ABNORMAL (ref 9–20)
CALCIUM SERPL-MCNC: 9.3 MG/DL (ref 8.6–10.4)
CHLORIDE BLD-SCNC: 100 MMOL/L (ref 98–107)
CO2: 22 MMOL/L (ref 20–31)
CREAT SERPL-MCNC: 1.35 MG/DL (ref 0.5–0.9)
GFR AFRICAN AMERICAN: 45 ML/MIN
GFR NON-AFRICAN AMERICAN: 37 ML/MIN
GFR SERPL CREATININE-BSD FRML MDRD: ABNORMAL ML/MIN/{1.73_M2}
GFR SERPL CREATININE-BSD FRML MDRD: ABNORMAL ML/MIN/{1.73_M2}
GLUCOSE BLD-MCNC: 84 MG/DL (ref 70–99)
POTASSIUM SERPL-SCNC: 4.3 MMOL/L (ref 3.7–5.3)
SODIUM BLD-SCNC: 132 MMOL/L (ref 135–144)
TOTAL PROTEIN: 6.1 G/DL (ref 6.4–8.3)

## 2020-08-27 PROCEDURE — 80053 COMPREHEN METABOLIC PANEL: CPT

## 2020-08-27 PROCEDURE — 36415 COLL VENOUS BLD VENIPUNCTURE: CPT

## 2020-08-27 NOTE — TELEPHONE ENCOUNTER
Just making sure that she does have follow-up appointment with nephrologist, per nephrology notes  Severe hyponatremia, needed rehospitalization, cortisol level was low    Note      Spoke with Dr. Munira Ritchie after patients this afternoon.   He will see patient next Thursday, Sept 3 at 1:45 pm.            Future Appointments   Date Time Provider Eric Vizcaino   12/4/2020  2:00 PM Cheryl Florentino MD [facility]

## 2020-08-27 NOTE — TELEPHONE ENCOUNTER
Norma sent to patient, BMP before appointment       Future Appointments   Date Time Provider Eric Charis   9/3/2020  2:00 PM Citlaly Esteban MD [facility]   12/4/2020  2:00 PM Daya Stevens MD [facility] [address] [facility]

## 2020-08-28 NOTE — DISCHARGE SUMMARY
[address]    Discharge Summary     Patient ID: Ruma Leal  :  1935   MRN: [id number]     ACCOUNT:  [de-identified]   Patient's PCP: Gale Silvestre MD  Admit Date: 2020   Discharge Date: 2020    Length of Stay: 3  Code Status:  Prior  Admitting Physician: Mykel Dumont MD  Discharge Physician: Rishi Saleem MD     Active Discharge Diagnoses:       Primary Problem  Hyponatremia      [place] Problems    Diagnosis Date Noted    Secondary hyperparathyroidism ([facility] [address] [N25.81] 2020     Priority: High    Hyponatremia [E87.1] 2020    Peripheral edema [R60.9] 2020    Anemia [D64.9] 2020    CKD (chronic kidney disease) stage 3, GFR 30-59 ml/min ([facility] [address] [N18.3]        Admission Condition:  fair     Discharged Condition: fair    Hospital Stay:       Hospital Course:  Ruma Leal is a 81 y.o. female who was admitted for the management of  Hyponatremia , presented to ER with Abnormal Lab (Sodium 116)    Patient was found to have hyponatremia on outpatient labs with Na value of 116. She was completely asymptomatic, no seizures, no loss of consiousness, no vomting, no diarrhea. 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 The Lasix was stopped on admission and IV fluids were given to replenish Na. Patient was discharged with blood sodium value of 126-128. Patient was stable on the time of discharge.  TSH and cortisol were within normal, Cortisol was 6.1        Significant therapeutic interventions:     Significant Diagnostic Studies:   Labs / Micro:  CBC:   Lab Results   Component Value Date    WBC 3.5 2020    RBC 2.66 2020    RBC 3.62 2012    HGB 8.7 2020    HCT 26.4 2020    MCV 99.3 2020    MCH 32.6 2020    MCHC 32.8 2020 Radiology:    Ct Head Wo Contrast    Result Date: 8/14/2020  EXAMINATION: CT OF THE HEAD WITHOUT CONTRAST  8/14/2020 4:59 pm TECHNIQUE: CT of the head was performed without the administration of intravenous contrast. Dose modulation, iterative reconstruction, and/or weight based adjustment of the mA/kV was utilized to reduce the radiation dose to as low as reasonably achievable. COMPARISON: 02/06/2015 HISTORY: ORDERING SYSTEM PROVIDED HISTORY: Loss of hearing with dizziness R ear for several days, please eval for lesion/mass TECHNOLOGIST PROVIDED HISTORY: Loss of hearing with dizziness R ear for several days, please eval for lesion/mass Reason for Exam: Loss of hearing with dizziness R ear for several days, please eval for lesion/mass Acuity: Acute Type of Exam: Initial Relevant Medical/Surgical History: Hx HBP controlled with meds; no hx stroke or surgery to area of interest FINDINGS: BRAIN/VENTRICLES: The ventricles and sulci are diffusely enlarged. Low attenuation is seen in the periventricular and subcortical white matter. No acute intracranial hemorrhage or acute infarct is identified. ORBITS: The visualized portion of the orbits demonstrate no acute abnormality. SINUSES: The visualized paranasal sinuses and mastoid air cells demonstrate no acute abnormality. SOFT TISSUES/SKULL:  No acute abnormality of the visualized skull or soft tissues. No acute intracranial abnormality. Diffuse atrophic changes with findings suggesting chronic microvascular ischemia     Xr Chest Portable    Result Date: 8/14/2020  EXAMINATION: ONE XRAY VIEW OF THE CHEST 8/14/2020 4:47 pm COMPARISON: 07/09/2013 HISTORY: ORDERING SYSTEM PROVIDED HISTORY: High anion gap, malaise TECHNOLOGIST PROVIDED HISTORY: High anion gap, malaise Reason for Exam: high anion gap, malaise Acuity: Unknown Type of Exam: Unknown FINDINGS: Heart size is within normal limits. There is a moderate-sized retrocardiac hiatal hernia.   Minimal blunting of the bilateral costophrenic angles. Lungs are otherwise clear. No pneumothorax. Bilateral reverse shoulder prosthesis. Prior resection of the distal right clavicle. No acute bone finding. Minimal blunting of the costophrenic angles. This may represent atelectasis and or trace pleural fluid. Lungs are otherwise clear. Moderate-sized hiatal hernia. Us Retroperitoneal Complete    Result Date: 8/15/2020  EXAMINATION: RETROPERITONEAL ULTRASOUND OF THE KIDNEYS 8/15/2020 COMPARISON: None HISTORY: ORDERING SYSTEM PROVIDED HISTORY: Acute kidney injury TECHNOLOGIST PROVIDED HISTORY: Acute kidney injury FINDINGS: Kidneys: Suboptimal evaluation. Some renal tissue is obscured. The right kidney measures 8.1 cm in length and the left kidney measures 8.2 cm in length. Kidneys are echogenic suggestive of medical renal disease. No contour deforming mass, shadowing stone or hydronephrosis. Small cyst involving the right kidney measuring 1.5 cm. Echogenic kidneys suggestive of medical renal disease. No acute process noted. Consultations:    Consults:     Final Specialist Recommendations/Findings:   IP CONSULT TO INTERNAL MEDICINE  IP CONSULT TO NEPHROLOGY  IP CONSULT TO SOCIAL WORK      The patient was seen and examined on day of discharge and this discharge summary is in conjunction with any daily progress note from day of discharge.     Discharge plan:       Disposition: Home    Physician Follow Up:     Daya Stevens MD  [address] [place] [address].  [address] [id number] [phone]             Requiring Further Evaluation/Follow Up POST HOSPITALIZATION/Incidental Findings:     Diet: regular diet and renal diet    Activity: As tolerated    Instructions to Patient: Follow with PCP after 1 week, repeat lab testing     Discharge Medications:      Medication List      CONTINUE taking these medications    allopurinol 100 MG tablet  Commonly known as:  ZYLOPRIM     aspirin 81 MG tablet  Take 1 tablet by mouth daily     atorvastatin 20 MG tablet  Commonly known as:  Lipitor  Take 1 tablet by mouth daily     cloNIDine 0.2 MG tablet  Commonly known as:  CATAPRES     iron polysaccharides 150 MG capsule  Commonly known as:  NIFEREX  Take 1 capsule by mouth daily     neomycin-polymyxin-hydrocortisone 3.5-92314-2 otic suspension  Commonly known as:  CORTISPORIN     OCUVITE ADULT 50+ PO     omeprazole 20 MG delayed release capsule  Commonly known as:  PRILOSEC        STOP taking these medications    carbamide peroxide 6.5 % otic solution  Commonly known as:  DEBROX     furosemide 20 MG tablet  Commonly known as:  LASIX            Time Spent on discharge is  33 mins in patient examination, evaluation, counseling as well as medication reconciliation, prescriptions for required medications, discharge plan and follow up. Electronically signed by   Krista Iglesias MD  8/27/2020  10:04 PM      Thank you Dr. Abigail Gordillo MD for the opportunity to be involved in this patient's care.

## 2020-09-01 ENCOUNTER — CARE COORDINATION (OUTPATIENT)
Dept: CASE MANAGEMENT | Age: 85
End: 2020-09-01

## 2020-09-01 NOTE — CARE COORDINATION
[facility] Transitions Follow Up Call    2020    Patient: Johnson Morfin  Patient : 1935   MRN: [id number]  Reason for Admission:   Discharge Date: 20 RARS: Readmission Risk Score: 21    Follow Up: Attempted to contact patient for BPCI-A follow up. Unable to reach patient. Left message with contact information and request for call back.       Future Appointments   Date Time Provider Eric Vizcaino   9/3/2020  2:00 PM Honorio Waldron MD [facility]   2020  2:00 PM Blaire Merino MD fp lilo Hoffman RN

## 2020-09-14 ENCOUNTER — CARE COORDINATION (OUTPATIENT)
Dept: CASE MANAGEMENT | Age: 85
End: 2020-09-14

## 2020-09-21 ENCOUNTER — CARE COORDINATION (OUTPATIENT)
Dept: CASE MANAGEMENT | Age: 85
End: 2020-09-21

## 2020-09-21 NOTE — CARE COORDINATION
9/21/2020 Final  Patient no longer eligible for BPCI bundle due to excluded DRG.      Krista Bautista, ARNULFON     [address] [facility] / [facility]

## 2021-03-14 NOTE — PROGRESS NOTES
[facility]  [facility]  [address]  [address] [address]  Dept: [phone]     3/15/2021   Chief Complaint   Patient presents with    Hypertension    Cyst     wrist right fingers cramp     Other     needs a script for handicap placard      SUSY  Georgiana Clements (:  1935) is a 81 y.o. female is an established patient of Dr. Lele Thakur. Patient has a history of hypertension chronic kidney disease hyperlipidemia, gout, significant drop in hemoglobin, anemia due to chronic kidney disease, chronic low back pain, arthritis multiple sites, osteoporosis, hypomagnesemia    [facility] has a well controlled hypertension. she is currently on metoprolol. Patient's most recent BP in the office was stable. she reports stable BP readings at home. Patient denies any adverse reaction to this therapy. she denies any CP, SOB, HA, or palpitations. Patient admits to exercising and adheres to low salt diet. No history of organ damage due to condition noted. Lab Results   Component Value Date/Time    CREATININE 1.35 (H) 2020 06:11 AM     CHRONIC KIDNEY DISEASE  Bruna Oropeza has a stage 3 CKD. Patient is under the care of DR. Acevedo.   Lab Results   Component Value Date/Time    K 4.3 2020 06:11 AM    BUN 16 2020 06:11 AM    CREATININE 1.35 (H) 2020 06:11 AM    LABGLOM 37 (L) 2020 06:11 AM    GFRAA 45 (L) 2020 06:11 AM      Devan Reed is currently on atorvastatin (Lipitor). Patient denies adverse reaction to this medication. Compliance with treatment thus far has been good. The patient is not known to have coexisting coronary artery disease. The CVD Risk score (D'Agostino, et al., 2008) failed to calculate for the following reasons:     The 2008 CVD risk score is only valid for ages 27 to 76  Lab Results   Component Value Date/Time    CHOL 179 10/14/2019 06:05 AM     10/14/2019 06:05 AM LDLCHOLESTEROL 10 10/14/2019 06:05 AM    TRIG 73 10/14/2019 06:05 AM    CHOLHDLRATIO 1.2 10/14/2019 06:05 AM    VLDL NOT REPORTED 10/14/2019 06:05 AM     Since patient stopped the Lasix she complains of some swelling on both of her legs this is not a new problem for her but since he stopped her Lasix her edema is worse end ed. 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 The patient reports worsening  occur primarily in the multiple joints. Patient reports her chronic pain is worse, her joint stiffness is worse and her joint swelling is worse. Limitation on activities include difficulty with walking, difficulty with getting dressed and difficulty with ADLs - . The patient is avoiding high purine foods and reports consuming 0 alcoholic drinksno relieving factors. Son advised her to take tylenol. Patient hesitant to take medicine but works. She wants to start back on allopurinol. Lab Results   Component Value Date/Time    URICACID 10.9 (H) 03/15/2021 04:10 PM     BACK PAIN  Patient also complains of some low back pain. Patient has known history of arthritis. Pain is achy as described turns into sharp throbbing sometimes sharp pain bothers her more at night when she sleeps. Also when she walks or stands for long period time. I will add lidocaine patch for pain relief. Patient lives alone. Patient does well. She declined to have any home health services. She does have a family members that checks on her once in a while. Patient was brought in by her son today. Son brought up another issue today since patient is living alone patient was invited to live with her son and daughter-in-law. However, patient does not want to leave her house. And she also does not want to be a burden to the family. PHQ-9 Total Score: 0 (3/15/2021  2:29 PM)      []Negative depression screening.    []1-4 = Minimal depression   []5-9 = Mild depression   []10-14 = Moderate depression   []15-19 = polysaccharides (NIFEREX) 150 MG capsule Take 1 capsule by mouth daily 60 capsule 3    allopurinol (ZYLOPRIM) 100 MG tablet Take 1 tablet by mouth daily 30 tablet 3    lidocaine (LIDODERM) 5 % Place 1 patch onto the skin daily 12 hours on, 12 hours off. 30 patch 0    Handicap Placard MISC by Does not apply route Expires March 2026 1 each 0    Magnesium Oxide (MAGNESIUM-OXIDE) 250 MG TABS tablet Take 1 tablet by mouth 2 times daily 60 tablet 1    cloNIDine (CATAPRES) 0.2 MG tablet Take 0.2 mg by mouth nightly      omeprazole (PRILOSEC) 20 MG delayed release capsule Take 20 mg by mouth daily      neomycin-polymyxin-hydrocortisone (CORTISPORIN) 3.5-72576-7 otic suspension Place 3 drops into the right ear 4 times daily For 10 days; Started 8/13/2020      atorvastatin (LIPITOR) 20 MG tablet Take 1 tablet by mouth daily 90 tablet 1    aspirin 81 MG tablet Take 1 tablet by mouth daily 90 tablet 1    Multiple Vitamins-Minerals (OCUVITE ADULT 50+ PO) Take 1 tablet by mouth daily       No current facility-administered medications for this visit. Review of Systems   Constitutional: Positive for activity change and fatigue. Negative for chills and fever. HENT: Negative. Respiratory: Negative. Negative for cough and shortness of breath. Cardiovascular: Positive for leg swelling. Negative for chest pain and palpitations. Gastrointestinal: Negative for abdominal pain. Early satiety   Genitourinary: Negative for dysuria, flank pain, frequency, hematuria, pelvic pain and urgency. Musculoskeletal: Positive for arthralgias, back pain, gait problem, joint swelling and myalgias. Skin: Negative for rash. Neurological: Negative for light-headedness and headaches. Hematological: Bruises/bleeds easily. Psychiatric/Behavioral: Positive for sleep disturbance. Negative for dysphoric mood and suicidal ideas. The patient is nervous/anxious. Prior to Visit Medications    Medication Sig Taking? Authorizing Provider   iron polysaccharides (NIFEREX) 150 MG capsule Take 1 capsule by mouth daily Yes ELIZABET Mills CNP   allopurinol (ZYLOPRIM) 100 MG tablet Take 1 tablet by mouth daily Yes ELIZABET Mills CNP   lidocaine (LIDODERM) 5 % Place 1 patch onto the skin daily 12 hours on, 12 hours off. Yes ELIZABET Mills CNP   Handicap Placard MISC by Does not apply route Expires March 2026 Yes ELIZABET Mills CNP   Magnesium Oxide (MAGNESIUM-OXIDE) 250 MG TABS tablet Take 1 tablet by mouth 2 times daily Yes ELIZABET Mills CNP   cloNIDine (CATAPRES) 0.2 MG tablet Take 0.2 mg by mouth nightly Yes Historical Provider, MD   omeprazole (PRILOSEC) 20 MG delayed release capsule Take 20 mg by mouth daily Yes Historical Provider, MD   neomycin-polymyxin-hydrocortisone (CORTISPORIN) 3.5-52358-6 otic suspension Place 3 drops into the right ear 4 times daily For 10 days; Started 8/13/2020 Yes Historical Provider, MD   atorvastatin (LIPITOR) 20 MG tablet Take 1 tablet by mouth daily Yes King Klein MD   aspirin 81 MG tablet Take 1 tablet by mouth daily Yes King Klein MD   Multiple Vitamins-Minerals (OCUVITE ADULT 50+ PO) Take 1 tablet by mouth daily Yes Historical Provider, MD      Social History     Tobacco Use    Smoking status: Never Smoker    Smokeless tobacco: Never Used   Substance Use Topics    Alcohol use: Yes     Alcohol/week: 2.0 standard drinks     Types: 2 Standard drinks or equivalent per week     Comment: occasionally      Vitals:    03/15/21 1424   BP: 110/70   Temp: 97.2 °F (36.2 °C)   Weight: 139 lb (63 kg)   Height: 5' (1.524 m)     Estimated body mass index is 27.15 kg/m² as calculated from the following:    Height as of this encounter: 5' (1.524 m). Weight as of this encounter: 139 lb (63 kg). Physical Exam  Vitals signs and nursing note reviewed. Constitutional:       Appearance: She is well-developed. HENT:      Head: Normocephalic. Right Ear: Tympanic membrane and external ear normal.      Left Ear: Tympanic membrane and external ear normal.      Nose: Nose normal.      Mouth/Throat:      Mouth: Mucous membranes are moist.   Eyes:      Pupils: Pupils are equal, round, and reactive to light. Neck:      Musculoskeletal: Normal range of motion and neck supple. Thyroid: No thyromegaly. Vascular: No JVD. Cardiovascular:      Rate and Rhythm: Normal rate and regular rhythm. Pulses: Normal pulses. Heart sounds: Normal heart sounds. No murmur. Pulmonary:      Effort: Pulmonary effort is normal. No respiratory distress. Breath sounds: Normal breath sounds. Abdominal:      General: Bowel sounds are normal. There is no distension. Palpations: Abdomen is soft. Tenderness: There is no abdominal tenderness. Musculoskeletal:      Thoracic back: She exhibits decreased range of motion and tenderness. Lumbar back: She exhibits decreased range of motion, tenderness and pain. Right hand: She exhibits decreased range of motion and tenderness. Left hand: She exhibits decreased range of motion and tenderness. Right lower le+ Edema present. Left lower le+ Edema present. Lymphadenopathy:      Cervical: No cervical adenopathy. Skin:     General: Skin is warm and dry. Capillary Refill: Capillary refill takes less than 2 seconds. Neurological:      Mental Status: She is alert and oriented to person, place, and time. Psychiatric:         Mood and Affect: Mood is anxious. Speech: Speech is delayed. Behavior: Behavior normal.       ASSESSMENT/PLAN:  1. Essential hypertension  Stable  Continue current therapy. DISCUSSED AND ADVISED TO:  Cut down on your salt intake. Cut down on caffeinated drinks, sports drinks. Instructed to check BP at home regularly. Report for any chest pains, shortness of breath, headaches, and lightheadedness.   Call the office if your heat packs 15 to 20 mins every 2-3 hours. Do some back stretches as tolerated. Refer to hand out for instructions. Call for worsening, numbness, weakness.      - lidocaine (LIDODERM) 5 %; Place 1 patch onto the skin daily 12 hours on, 12 hours off. Dispense: 30 patch; Refill: 0  - Handicap Placard MISC; by Does not apply route Expires March 2026  Dispense: 1 each; Refill: 0    8. Arthritis involving multiple sites  Failure to Improve  Continue current therapy. DISCUSSED and ADVISED TO:  Stay at a healthy weight. Continue exercises/PT  Stretch to help prevent stiffness and to prevent injury before exercise. Gentle forms of yoga help keep joints and muscles flexible. Walk instead of jog, ride a bike, swim, and water exercise. Lift weights as tolerated. strong muscles help reduce stress on joints. Take pain medicines exactly as directed and only as needed. - Handicap Placard MISC; by Does not apply route Expires March 2026  Dispense: 1 each; Refill: 0    9. Age-related osteoporosis without current pathological fracture  Failure to Improve  DISCUSSED AND ADVISED TO:  Avoid smoking  Limit alcohol consumption to 2 drinks a day or less. Will repeat DEXA scan in 2 years. Weight-bearing exercise is important in helping to maintain and even increase bone mass. Take calcium 9143-6128 mg per day. Vitamin D (400-800 IU per day) is necessary to assist in the absorption of calcium. Eat foods that are rich in Calcium. It is important that you take all medications as directed. Bone loss can only be prevented and progression can be avoided with above therapy. - Handicap Placard MISC; by Does not apply route Expires March 2026  Dispense: 1 each; Refill: 0    10. Hypomagnesemia  Failure to Improve  Low magnesium level  Start supplementation.  - Magnesium; Future  - Magnesium Oxide (MAGNESIUM-OXIDE) 250 MG TABS tablet; Take 1 tablet by mouth 2 times daily  Dispense: 60 tablet;  Refill: 1     Controlled Substance Monitoring:  Acute and Chronic Pain Monitoring:   No flowsheet data found. Orders Placed This Encounter   Procedures    CBC Auto Differential     Standing Status:   Future     Standing Expiration Date:   9/14/2022    Comprehensive Metabolic Panel, Fasting     Standing Status:   Future     Standing Expiration Date:   9/14/2022    Lipid, Fasting     Standing Status:   Future     Standing Expiration Date:   9/14/2022    Magnesium     Standing Status:   Future     Number of Occurrences:   1     Standing Expiration Date:   9/15/2022    Uric Acid     Standing Status:   Future     Number of Occurrences:   1     Standing Expiration Date:   9/15/2022    Urinalysis Reflex to Culture     Standing Status:   Future     Standing Expiration Date:   9/15/2022     Order Specific Question:   SPECIFY(EX-CATH,MIDSTREAM,CYSTO,ETC)? Answer:   midstream     Orders Placed This Encounter   Medications    iron polysaccharides (NIFEREX) 150 MG capsule     Sig: Take 1 capsule by mouth daily     Dispense:  60 capsule     Refill:  3    allopurinol (ZYLOPRIM) 100 MG tablet     Sig: Take 1 tablet by mouth daily     Dispense:  30 tablet     Refill:  3    lidocaine (LIDODERM) 5 %     Sig: Place 1 patch onto the skin daily 12 hours on, 12 hours off.      Dispense:  30 patch     Refill:  0    Handicap Placard MISC     Sig: by Does not apply route Expires March 2026     Dispense:  1 each     Refill:  0    Magnesium Oxide (MAGNESIUM-OXIDE) 250 MG TABS tablet     Sig: Take 1 tablet by mouth 2 times daily     Dispense:  60 tablet     Refill:  1      Medications Discontinued During This Encounter   Medication Reason    allopurinol (ZYLOPRIM) 100 MG tablet REORDER    iron polysaccharides (NIFEREX) 150 MG capsule REORDER      Health Maintenance Due   Topic Date Due    COVID-19 Vaccine (1) Never done    DTaP/Tdap/Td vaccine (1 - Tdap) Never done    Shingles Vaccine (1 of 2) Never done   [employer] Visit (AWV)  Never done  Lipid screen  10/14/2020      Return in about 3 months (around 6/15/2021) for Chronic conditions. Claudia Cerratos received counseling on the following healthy behaviors: nutrition, exercise and medication adherence  Reviewed prior labs and health maintenance  Continue current medications, diet and exercise. Discussed use, benefit, and side effects of prescribed medications. Barriers to medication compliance addressed. Patient given educational materials - see patient instructions  Was a self-tracking handout given in paper form or via artaculoust? Yes    Requested Prescriptions     Signed Prescriptions Disp Refills    iron polysaccharides (NIFEREX) 150 MG capsule 60 capsule 3     Sig: Take 1 capsule by mouth daily    allopurinol (ZYLOPRIM) 100 MG tablet 30 tablet 3     Sig: Take 1 tablet by mouth daily    lidocaine (LIDODERM) 5 % 30 patch 0     Sig: Place 1 patch onto the skin daily 12 hours on, 12 hours off.  Handicap Placard MISC 1 each 0     Sig: by Does not apply route Expires March 2026    Magnesium Oxide (MAGNESIUM-OXIDE) 250 MG TABS tablet 60 tablet 1     Sig: Take 1 tablet by mouth 2 times daily       All patient questions answered. Patient voiced understanding. Quality Measures    Body mass index is 27.15 kg/m². Elevated. Weight control planned discussed Healthy diet and regular exercise. BP: 110/70. Blood pressure is normal. Treatment plan consists of No treatment change needed. Fall Risk 8/11/2020 10/29/2018 8/29/2017 5/26/2017 3/22/2016 2/27/2015 10/2/2014   2 or more falls in past year? no no yes no no no no   Fall with injury in past year? no no no yes yes no no     The patient does not have a history of falls. I did not - not indicated , complete a risk assessment for falls.  A plan of care for falls No Treatment plan indicated    Lab Results   Component Value Date    LDLCHOLESTEROL 10 10/14/2019    (goal LDL reduction with dx if diabetes is 50% LDL reduction)    PHQ Scores 3/15/2021 8/11/2020 10/10/2019 10/29/2018 5/26/2017 3/22/2016   PHQ2 Score 0 0 0 0 0 0   PHQ9 Score 0 0 0 0 0 0     Interpretation of Total Score Depression Severity: 1-4 = Minimal depression, 5-9 = Mild depression, 10-14 = Moderate depression, 15-19 = Moderately severe depression, 20-27 = Severe depression     This note was completed by using the assistance of a speech-recognition program. However, inadvertent computerized transcription errors may be present. Although every effort was made to ensure accuracy, no guarantees can be provided that every mistake has been identified and corrected by editing   An electronic signature was used to authenticate this note.   --ELIZABET Olivares - CNP on 3/15/2021 at 7:24 PM

## 2021-03-15 ENCOUNTER — HOSPITAL ENCOUNTER (OUTPATIENT)
Age: 86
Discharge: HOME | End: 2021-03-15
Payer: MEDICARE

## 2021-03-15 ENCOUNTER — OFFICE VISIT (OUTPATIENT)
Dept: FAMILY MEDICINE CLINIC | Age: 86
End: 2021-03-15
Payer: MEDICARE

## 2021-03-15 VITALS
BODY MASS INDEX: 27.29 KG/M2 | WEIGHT: 139 LBS | HEIGHT: 60 IN | TEMPERATURE: 97.2 F | SYSTOLIC BLOOD PRESSURE: 110 MMHG | DIASTOLIC BLOOD PRESSURE: 70 MMHG

## 2021-03-15 DIAGNOSIS — E83.42 HYPOMAGNESEMIA: ICD-10-CM

## 2021-03-15 DIAGNOSIS — M1A.0721 IDIOPATHIC CHRONIC GOUT OF LEFT FOOT WITH TOPHUS: ICD-10-CM

## 2021-03-15 DIAGNOSIS — M12.9 ARTHRITIS INVOLVING MULTIPLE SITES: ICD-10-CM

## 2021-03-15 DIAGNOSIS — G89.29 CHRONIC MIDLINE LOW BACK PAIN WITHOUT SCIATICA: ICD-10-CM

## 2021-03-15 DIAGNOSIS — N18.31 STAGE 3A CHRONIC KIDNEY DISEASE (HCC): ICD-10-CM

## 2021-03-15 DIAGNOSIS — N18.31 ANEMIA DUE TO STAGE 3A CHRONIC KIDNEY DISEASE (HCC): ICD-10-CM

## 2021-03-15 DIAGNOSIS — D58.2 SIGNIFICANT DROP IN HEMOGLOBIN (HCC): ICD-10-CM

## 2021-03-15 DIAGNOSIS — D63.1 ANEMIA DUE TO STAGE 3A CHRONIC KIDNEY DISEASE (HCC): ICD-10-CM

## 2021-03-15 DIAGNOSIS — M1A.0720 IDIOPATHIC CHRONIC GOUT OF LEFT FOOT WITHOUT TOPHUS: ICD-10-CM

## 2021-03-15 DIAGNOSIS — I10 ESSENTIAL HYPERTENSION: Primary | ICD-10-CM

## 2021-03-15 DIAGNOSIS — E78.2 MIXED HYPERLIPIDEMIA: ICD-10-CM

## 2021-03-15 DIAGNOSIS — M81.0 AGE-RELATED OSTEOPOROSIS WITHOUT CURRENT PATHOLOGICAL FRACTURE: ICD-10-CM

## 2021-03-15 DIAGNOSIS — M54.50 CHRONIC MIDLINE LOW BACK PAIN WITHOUT SCIATICA: ICD-10-CM

## 2021-03-15 LAB
MAGNESIUM: 1.2 MG/DL (ref 1.6–2.6)
URIC ACID: 10.9 MG/DL (ref 2.4–5.7)

## 2021-03-15 PROCEDURE — 83735 ASSAY OF MAGNESIUM: CPT

## 2021-03-15 PROCEDURE — G8417 CALC BMI ABV UP PARAM F/U: HCPCS | Performed by: FAMILY MEDICINE

## 2021-03-15 PROCEDURE — 99214 OFFICE O/P EST MOD 30 MIN: CPT | Performed by: FAMILY MEDICINE

## 2021-03-15 PROCEDURE — 1123F ACP DISCUSS/DSCN MKR DOCD: CPT | Performed by: FAMILY MEDICINE

## 2021-03-15 PROCEDURE — 1036F TOBACCO NON-USER: CPT | Performed by: FAMILY MEDICINE

## 2021-03-15 PROCEDURE — 81003 URINALYSIS AUTO W/O SCOPE: CPT | Performed by: FAMILY MEDICINE

## 2021-03-15 PROCEDURE — 84550 ASSAY OF BLOOD/URIC ACID: CPT

## 2021-03-15 PROCEDURE — 36415 COLL VENOUS BLD VENIPUNCTURE: CPT

## 2021-03-15 PROCEDURE — 4040F PNEUMOC VAC/ADMIN/RCVD: CPT | Performed by: FAMILY MEDICINE

## 2021-03-15 PROCEDURE — G8427 DOCREV CUR MEDS BY ELIG CLIN: HCPCS | Performed by: FAMILY MEDICINE

## 2021-03-15 PROCEDURE — G8399 PT W/DXA RESULTS DOCUMENT: HCPCS | Performed by: FAMILY MEDICINE

## 2021-03-15 PROCEDURE — 1090F PRES/ABSN URINE INCON ASSESS: CPT | Performed by: FAMILY MEDICINE

## 2021-03-15 PROCEDURE — G8484 FLU IMMUNIZE NO ADMIN: HCPCS | Performed by: FAMILY MEDICINE

## 2021-03-15 RX ORDER — IRON POLYSACCHARIDE COMPLEX 150 MG
150 CAPSULE ORAL DAILY
Qty: 60 CAPSULE | Refills: 3 | Status: SHIPPED | OUTPATIENT
Start: 2021-03-15 | End: 2021-05-04 | Stop reason: SDUPTHER

## 2021-03-15 RX ORDER — LIDOCAINE 50 MG/G
1 PATCH TOPICAL DAILY
Qty: 30 PATCH | Refills: 0 | Status: SHIPPED | OUTPATIENT
Start: 2021-03-15 | End: 2021-04-14

## 2021-03-15 RX ORDER — MULTIVITAMIN/IRON/FOLIC ACID 18MG-0.4MG
250 TABLET ORAL 2 TIMES DAILY
Qty: 60 TABLET | Refills: 1 | Status: SHIPPED | OUTPATIENT
Start: 2021-03-15 | End: 2021-04-09 | Stop reason: SDUPTHER

## 2021-03-15 RX ORDER — ALLOPURINOL 100 MG/1
100 TABLET ORAL DAILY
Qty: 30 TABLET | Refills: 3 | Status: SHIPPED | OUTPATIENT
Start: 2021-03-15 | End: 2021-03-22 | Stop reason: SDUPTHER

## 2021-03-15 ASSESSMENT — PATIENT HEALTH QUESTIONNAIRE - PHQ9
SUM OF ALL RESPONSES TO PHQ QUESTIONS 1-9: 0
2. FEELING DOWN, DEPRESSED OR HOPELESS: 0
SUM OF ALL RESPONSES TO PHQ QUESTIONS 1-9: 0
1. LITTLE INTEREST OR PLEASURE IN DOING THINGS: 0
SUM OF ALL RESPONSES TO PHQ QUESTIONS 1-9: 0

## 2021-03-15 ASSESSMENT — ENCOUNTER SYMPTOMS
SHORTNESS OF BREATH: 0
BACK PAIN: 1
ABDOMINAL PAIN: 0
COUGH: 0
RESPIRATORY NEGATIVE: 1

## 2021-03-15 NOTE — PATIENT INSTRUCTIONS
[facility] COVID-19 Vaccination Hotline: [phone]    COVID-19 vaccine appointments are not available through our practice. As you're eligible to receive the COVID-19 vaccine, as determined by your state's department of health, you will be able to schedule an appointment through [address] or by calling [phone]. Appointments are required to receive the COVID-19 vaccine. As vaccine supply continues to be limited, we anticipate open appointments to fill up quickly and appreciate your patience as we work through the process of providing vaccines to those in our communities. Schedule a Vaccine  When you qualify to receive the vaccine, call the [facility] COVID-19 Vaccination Hotline to schedule your appointment or to get additional information about the [facility] locations which are offering the COVID-19 vaccine. To be 94% effective, it's important that you receive two doses of one of the COVID-19 vaccines. -If you are receiving the Madden Peter vaccine, your second shot will be scheduled as close to 21 days after the first shot as possible. -If you are receiving the Moderna vaccine, your second shot will be scheduled as close to 28 days after the first shot as possible. Links to [facility]) website and [facility] website:    [employer]/[facility]-monitoring-coronavirus-covid-19/covid-19-vaccine/[place]-vaccine    https://[employer].TechForward/covidvaccine    m2fx  https://sites. google. com/view/wchd[place]-coronavirus/home/vaccines/get-vaccinated  LocalElectrolysis.fi. com/r/[place]_Vaccine_On-Call_List      https://Skigit/shared/[email or web address]?:toolbar=n&:display_count=n&:origin=viz_share_link&:embed=y    PHARMACY LOCATIONS  Https://vaccine. coronavirus. [place].gov/    [facility]  Https://Skigit/shared/[email or web address]?:toolbar=n&:display_count=n&:origin=viz_share_link&:embed=y    [facility] Https://Tasqe/XL Video/[email or web address]?:toolbar=n&:display_count=n&:origin=PresenceID_share_link&:embed=y    [address]  Https://Tasqe/XL Video/[email or web address]?:toolbar=n&:display_count=n&:origin=Motribe_link&:embed=y    [address]  https://Tasqe/shared/[email or web address]?:toolbar=n&:display_count=n&:origin=PresenceID_share_link&:embed=y

## 2021-03-17 ENCOUNTER — HOSPITAL ENCOUNTER (OUTPATIENT)
Age: 86
Discharge: HOME | End: 2021-03-17
Payer: MEDICARE

## 2021-03-17 ENCOUNTER — TELEPHONE (OUTPATIENT)
Dept: FAMILY MEDICINE CLINIC | Age: 86
End: 2021-03-17

## 2021-03-17 DIAGNOSIS — I10 ESSENTIAL HYPERTENSION: ICD-10-CM

## 2021-03-17 DIAGNOSIS — E78.2 MIXED HYPERLIPIDEMIA: ICD-10-CM

## 2021-03-17 DIAGNOSIS — N18.31 STAGE 3A CHRONIC KIDNEY DISEASE (HCC): ICD-10-CM

## 2021-03-17 LAB
-: ABNORMAL
ABSOLUTE EOS #: 0.2 K/UL (ref 0–0.4)
ABSOLUTE IMMATURE GRANULOCYTE: ABNORMAL K/UL (ref 0–0.3)
ABSOLUTE LYMPH #: 2.2 K/UL (ref 1–4.8)
ABSOLUTE MONO #: 0.6 K/UL (ref 0.1–1.3)
ALBUMIN SERPL-MCNC: 3.9 G/DL (ref 3.5–5.2)
ALBUMIN/GLOBULIN RATIO: ABNORMAL (ref 1–2.5)
ALP BLD-CCNC: 101 U/L (ref 35–104)
ALT SERPL-CCNC: 9 U/L (ref 5–33)
AMORPHOUS: ABNORMAL
ANION GAP SERPL CALCULATED.3IONS-SCNC: 20 MMOL/L (ref 9–17)
AST SERPL-CCNC: 25 U/L
BACTERIA: ABNORMAL
BASOPHILS # BLD: 1 % (ref 0–2)
BASOPHILS ABSOLUTE: 0 K/UL (ref 0–0.2)
BILIRUB SERPL-MCNC: 0.51 MG/DL (ref 0.3–1.2)
BILIRUBIN URINE: NEGATIVE
BUN BLDV-MCNC: 48 MG/DL (ref 8–23)
BUN/CREAT BLD: ABNORMAL (ref 9–20)
CALCIUM SERPL-MCNC: 8.4 MG/DL (ref 8.6–10.4)
CASTS UA: ABNORMAL /LPF
CHLORIDE BLD-SCNC: 88 MMOL/L (ref 98–107)
CHOLESTEROL, FASTING: 178 MG/DL
CHOLESTEROL/HDL RATIO: 2.3
CO2: 17 MMOL/L (ref 20–31)
COLOR: YELLOW
COMMENT UA: ABNORMAL
CREAT SERPL-MCNC: 2.65 MG/DL (ref 0.5–0.9)
CRYSTALS, UA: ABNORMAL /HPF
DIFFERENTIAL TYPE: ABNORMAL
EOSINOPHILS RELATIVE PERCENT: 2 % (ref 0–4)
EPITHELIAL CELLS UA: ABNORMAL /HPF
GFR AFRICAN AMERICAN: 21 ML/MIN
GFR NON-AFRICAN AMERICAN: 17 ML/MIN
GFR SERPL CREATININE-BSD FRML MDRD: ABNORMAL ML/MIN/{1.73_M2}
GFR SERPL CREATININE-BSD FRML MDRD: ABNORMAL ML/MIN/{1.73_M2}
GLUCOSE FASTING: 80 MG/DL (ref 70–99)
GLUCOSE URINE: NEGATIVE
HCT VFR BLD CALC: 33.3 % (ref 36–46)
HDLC SERPL-MCNC: 78 MG/DL
HEMOGLOBIN: 11.1 G/DL (ref 12–16)
IMMATURE GRANULOCYTES: ABNORMAL %
KETONES, URINE: NEGATIVE
LDL CHOLESTEROL: 74 MG/DL (ref 0–130)
LEUKOCYTE ESTERASE, URINE: ABNORMAL
LYMPHOCYTES # BLD: 27 % (ref 24–44)
MCH RBC QN AUTO: 33.5 PG (ref 26–34)
MCHC RBC AUTO-ENTMCNC: 33.4 G/DL (ref 31–37)
MCV RBC AUTO: 100.5 FL (ref 80–100)
MONOCYTES # BLD: 7 % (ref 1–7)
MUCUS: ABNORMAL
NITRITE, URINE: NEGATIVE
NRBC AUTOMATED: ABNORMAL PER 100 WBC
OTHER OBSERVATIONS UA: ABNORMAL
PDW BLD-RTO: 17.6 % (ref 11.5–14.9)
PH UA: 5 (ref 5–8)
PLATELET # BLD: 291 K/UL (ref 150–450)
PLATELET ESTIMATE: ABNORMAL
PMV BLD AUTO: 8.7 FL (ref 6–12)
POTASSIUM SERPL-SCNC: 3.7 MMOL/L (ref 3.7–5.3)
PROTEIN UA: NEGATIVE
RBC # BLD: 3.31 M/UL (ref 4–5.2)
RBC # BLD: ABNORMAL 10*6/UL
RBC UA: ABNORMAL /HPF
RENAL EPITHELIAL, UA: ABNORMAL /HPF
SEG NEUTROPHILS: 63 % (ref 36–66)
SEGMENTED NEUTROPHILS ABSOLUTE COUNT: 5.3 K/UL (ref 1.3–9.1)
SODIUM BLD-SCNC: 125 MMOL/L (ref 135–144)
SPECIFIC GRAVITY UA: 1.01 (ref 1–1.03)
TOTAL PROTEIN: 6.5 G/DL (ref 6.4–8.3)
TRICHOMONAS: ABNORMAL
TRIGLYCERIDE, FASTING: 129 MG/DL
TURBIDITY: CLEAR
URINE HGB: NEGATIVE
UROBILINOGEN, URINE: NORMAL
VLDLC SERPL CALC-MCNC: NORMAL MG/DL (ref 1–30)
WBC # BLD: 8.3 K/UL (ref 3.5–11)
WBC # BLD: ABNORMAL 10*3/UL
WBC UA: ABNORMAL /HPF
YEAST: ABNORMAL

## 2021-03-17 PROCEDURE — 81001 URINALYSIS AUTO W/SCOPE: CPT

## 2021-03-17 PROCEDURE — 36415 COLL VENOUS BLD VENIPUNCTURE: CPT

## 2021-03-17 PROCEDURE — 80061 LIPID PANEL: CPT

## 2021-03-17 PROCEDURE — 80053 COMPREHEN METABOLIC PANEL: CPT

## 2021-03-17 PROCEDURE — 85025 COMPLETE CBC W/AUTO DIFF WBC: CPT

## 2021-03-18 ENCOUNTER — CARE COORDINATION (OUTPATIENT)
Dept: CASE MANAGEMENT | Age: 86
End: 2021-03-18

## 2021-03-18 NOTE — TELEPHONE ENCOUNTER
SPOKE WITH PATIENTS SON REGARDING RESULTS VERBALIZED UNDERSTANDING.  THEY ARE WORKING ON GETTING PATIENT AN APPOINTMENT WITH NEPHROLOGY
REPORTED 03/17/2021    VLDL NOT REPORTED 10/14/2019    VLDL NOT REPORTED 06/18/2018     Lab Results   Component Value Date    CHOLHDLRATIO 2.3 03/17/2021    CHOLHDLRATIO 1.2 10/14/2019    CHOLHDLRATIO 2.2 06/18/2018     Lab Results   Component Value Date    LABA1C 4.9 08/14/2020     Lab Results   Component Value Date    RAXOHCWX30 449 08/14/2020     Lab Results   Component Value Date    FOLATE 15.3 08/14/2020     Lab Results   Component Value Date    IRON 177 (H) 08/14/2020    TIBC 290 08/14/2020    FERRITIN 136 08/14/2020     Lab Results   Component Value Date    VITD25 46.2 08/14/2020

## 2021-03-18 NOTE — CARE COORDINATION
\"see note from nephrology telephone encounter. Patient to be seen tomorrow\"          Called for patient appt with Dr. Bam Villasenor as requested by CTN. Called office,  wants to send note to physician as to who she should see. Patient has already been seen in office by another specialist.      They will call patient when physician replies.     Amalia Rodríguez, [address] [facility] Coordination Transition

## 2021-03-19 ENCOUNTER — TELEPHONE (OUTPATIENT)
Dept: FAMILY MEDICINE CLINIC | Age: 86
End: 2021-03-19

## 2021-03-19 DIAGNOSIS — I10 ESSENTIAL HYPERTENSION: Primary | ICD-10-CM

## 2021-03-19 RX ORDER — HYDRALAZINE HYDROCHLORIDE 25 MG/1
TABLET, FILM COATED ORAL
Qty: 270 TABLET | Refills: 1 | Status: SHIPPED | OUTPATIENT
Start: 2021-03-19 | End: 2021-07-28 | Stop reason: SDUPTHER

## 2021-03-19 NOTE — TELEPHONE ENCOUNTER
Her BP still seems high. I re ordered the hydralazine. TID. She had taken them before. Please ensure she made an appt with nephrology. Thanks.

## 2021-03-19 NOTE — TELEPHONE ENCOUNTER
Patient called in with her BP readings for the week. Patient also states she is retaining some water as her legs are swollen. Please advise.      03/15/21    8 pm 158/85   Pulse 74    03/16/21   8 am 123/67  Pulse 66   8 pm 155/80  Pulse 72    03/17/21    6 am 156/93 Pulse 67   12 pm 162/94 Pulse 75   8 pm 166/93 Pulse 71    03/18/21   7 am 138/81 Pulse 67   1 pm 166/95 Pulse 71   8 pm 178/98 Pulse 76    03/19/21   7 am 177/109 Pulse 69   10 am 176/107 Pulse 86      RX: Tami Judd

## 2021-03-22 ENCOUNTER — HOSPITAL ENCOUNTER (OUTPATIENT)
Age: 86
Setting detail: SPECIMEN
Discharge: HOME | End: 2021-03-22
Payer: MEDICARE

## 2021-03-22 ENCOUNTER — TELEPHONE (OUTPATIENT)
Dept: FAMILY MEDICINE CLINIC | Age: 86
End: 2021-03-22

## 2021-03-22 ENCOUNTER — IMMUNIZATION (OUTPATIENT)
Dept: PRIMARY CARE CLINIC | Age: 86
End: 2021-03-22
Payer: MEDICARE

## 2021-03-22 DIAGNOSIS — N18.4 CKD (CHRONIC KIDNEY DISEASE), STAGE IV (HCC): ICD-10-CM

## 2021-03-22 DIAGNOSIS — E83.51 HYPOCALCEMIA: ICD-10-CM

## 2021-03-22 DIAGNOSIS — E83.42 HYPOMAGNESEMIA: ICD-10-CM

## 2021-03-22 DIAGNOSIS — M1A.0721 IDIOPATHIC CHRONIC GOUT OF LEFT FOOT WITH TOPHUS: ICD-10-CM

## 2021-03-22 DIAGNOSIS — E87.1 HYPONATREMIA: ICD-10-CM

## 2021-03-22 DIAGNOSIS — N18.4 ANEMIA IN STAGE 4 CHRONIC KIDNEY DISEASE (HCC): ICD-10-CM

## 2021-03-22 DIAGNOSIS — E78.5 HYPERLIPIDEMIA WITH TARGET LDL LESS THAN 100: ICD-10-CM

## 2021-03-22 DIAGNOSIS — E87.1 HYPONATREMIA: Primary | ICD-10-CM

## 2021-03-22 DIAGNOSIS — N18.4 BENIGN HYPERTENSION WITH CKD (CHRONIC KIDNEY DISEASE) STAGE IV (HCC): ICD-10-CM

## 2021-03-22 DIAGNOSIS — I12.9 BENIGN HYPERTENSION WITH CKD (CHRONIC KIDNEY DISEASE) STAGE III (HCC): ICD-10-CM

## 2021-03-22 DIAGNOSIS — D63.1 ANEMIA IN STAGE 4 CHRONIC KIDNEY DISEASE (HCC): ICD-10-CM

## 2021-03-22 DIAGNOSIS — I12.9 BENIGN HYPERTENSION WITH CKD (CHRONIC KIDNEY DISEASE) STAGE IV (HCC): ICD-10-CM

## 2021-03-22 DIAGNOSIS — N25.81 SECONDARY HYPERPARATHYROIDISM (HCC): ICD-10-CM

## 2021-03-22 DIAGNOSIS — N18.30 BENIGN HYPERTENSION WITH CKD (CHRONIC KIDNEY DISEASE) STAGE III (HCC): ICD-10-CM

## 2021-03-22 DIAGNOSIS — E79.0 ELEVATED URIC ACID IN BLOOD: ICD-10-CM

## 2021-03-22 DIAGNOSIS — K21.9 GASTROESOPHAGEAL REFLUX DISEASE WITHOUT ESOPHAGITIS: ICD-10-CM

## 2021-03-22 LAB
ANION GAP SERPL CALCULATED.3IONS-SCNC: 15 MMOL/L (ref 9–17)
BUN BLDV-MCNC: 29 MG/DL (ref 8–23)
CHLORIDE BLD-SCNC: 88 MMOL/L (ref 98–107)
CO2: 19 MMOL/L (ref 20–31)
CREAT SERPL-MCNC: 2.02 MG/DL (ref 0.5–0.9)
GFR AFRICAN AMERICAN: 28 ML/MIN
GFR NON-AFRICAN AMERICAN: 23 ML/MIN
GFR SERPL CREATININE-BSD FRML MDRD: ABNORMAL ML/MIN/{1.73_M2}
GFR SERPL CREATININE-BSD FRML MDRD: ABNORMAL ML/MIN/{1.73_M2}
POTASSIUM SERPL-SCNC: 4.7 MMOL/L (ref 3.7–5.3)
SODIUM BLD-SCNC: 122 MMOL/L (ref 135–144)

## 2021-03-22 PROCEDURE — 91300 COVID-19, PFIZER VACCINE 30MCG/0.3ML DOSE: CPT | Performed by: INTERNAL MEDICINE

## 2021-03-22 PROCEDURE — 0001A COVID-19, PFIZER VACCINE 30MCG/0.3ML DOSE: CPT | Performed by: INTERNAL MEDICINE

## 2021-03-22 RX ORDER — ATORVASTATIN CALCIUM 20 MG/1
20 TABLET, FILM COATED ORAL DAILY
Qty: 90 TABLET | Refills: 3 | Status: SHIPPED | OUTPATIENT
Start: 2021-03-22 | End: 2021-07-28 | Stop reason: SDUPTHER

## 2021-03-22 RX ORDER — OMEPRAZOLE 20 MG/1
20 CAPSULE, DELAYED RELEASE ORAL DAILY
Qty: 90 CAPSULE | Refills: 3 | Status: SHIPPED | OUTPATIENT
Start: 2021-03-22 | End: 2021-04-08

## 2021-03-22 RX ORDER — ALLOPURINOL 100 MG/1
100 TABLET ORAL DAILY
Qty: 90 TABLET | Refills: 3 | Status: SHIPPED | OUTPATIENT
Start: 2021-03-22 | End: 2021-07-28 | Stop reason: SDUPTHER

## 2021-03-22 RX ORDER — SODIUM CHLORIDE 1000 MG
1 TABLET, SOLUBLE MISCELLANEOUS 3 TIMES DAILY
Qty: 90 TABLET | Refills: 0 | Status: SHIPPED | OUTPATIENT
Start: 2021-03-22 | End: 2021-04-09 | Stop reason: SDUPTHER

## 2021-03-22 RX ORDER — CLONIDINE HYDROCHLORIDE 0.2 MG/1
0.2 TABLET ORAL NIGHTLY
Qty: 90 TABLET | Refills: 3 | Status: SHIPPED | OUTPATIENT
Start: 2021-03-22 | End: 2021-04-09 | Stop reason: SDUPTHER

## 2021-03-22 NOTE — TELEPHONE ENCOUNTER
SPOKE WITH dR CUI, IF NOT SYMPTOMATIC, DUE TO CHRONIC hyponatremia, specifically no headache and no dizziness, to start sodium chloride 1 g 3 times a day and repeat sodium in 48 hours. She is not on any diuretics, and no congestive heart failure, prior notes reviewed    I called the patient and I spoke with Minnie Fabry, who tells me she is at baseline, and specifically no dizziness and no headache, but admits that she has been drinking more water than usually. I sent out sodium chloride to the pharmacy [facility], and new blood work  Placed.     Patient son also requested refills for [facility]    Orders Placed This Encounter   Medications    sodium chloride 1 g tablet     Sig: Take 1 tablet by mouth 3 times daily     Dispense:  90 tablet     Refill:  0    allopurinol (ZYLOPRIM) 100 MG tablet     Sig: Take 1 tablet by mouth daily     Dispense:  90 tablet     Refill:  3    cloNIDine (CATAPRES) 0.2 MG tablet     Sig: Take 1 tablet by mouth nightly     Dispense:  90 tablet     Refill:  3    omeprazole (PRILOSEC) 20 MG delayed release capsule     Sig: Take 1 capsule by mouth daily     Dispense:  90 capsule     Refill:  3    atorvastatin (LIPITOR) 20 MG tablet     Sig: Take 1 tablet by mouth daily     Dispense:  90 tablet     Refill:  3       Orders Placed This Encounter   Procedures    Basic Metabolic Panel     Standing Status:   Future     Standing Expiration Date:   3/22/2022       Future Appointments   Date Time Provider Eric Vizcaino   4/12/2021  2:00 PM [facility], PFIZER 21 DAY SECOND DOSE [facility]   5/27/2021 11:40 AM Renate Hall MD [facility]   7/28/2021  3:45 PM [facility]

## 2021-03-25 ENCOUNTER — HOSPITAL ENCOUNTER (OUTPATIENT)
Age: 86
Setting detail: OBSERVATION
Discharge: HOME | End: 2021-03-27
Attending: EMERGENCY MEDICINE | Admitting: INTERNAL MEDICINE
Payer: MEDICARE

## 2021-03-25 ENCOUNTER — HOSPITAL ENCOUNTER (OUTPATIENT)
Age: 86
Discharge: HOME | End: 2021-03-25
Payer: MEDICARE

## 2021-03-25 DIAGNOSIS — N18.4 CKD (CHRONIC KIDNEY DISEASE), STAGE IV (HCC): ICD-10-CM

## 2021-03-25 DIAGNOSIS — E87.1 HYPONATREMIA: Primary | ICD-10-CM

## 2021-03-25 DIAGNOSIS — E87.1 HYPONATREMIA: ICD-10-CM

## 2021-03-25 DIAGNOSIS — N18.4 BENIGN HYPERTENSION WITH CKD (CHRONIC KIDNEY DISEASE) STAGE IV (HCC): ICD-10-CM

## 2021-03-25 DIAGNOSIS — I12.9 BENIGN HYPERTENSION WITH CKD (CHRONIC KIDNEY DISEASE) STAGE IV (HCC): ICD-10-CM

## 2021-03-25 LAB
ABSOLUTE EOS #: 0.1 K/UL (ref 0–0.4)
ABSOLUTE IMMATURE GRANULOCYTE: ABNORMAL K/UL (ref 0–0.3)
ABSOLUTE LYMPH #: 1.3 K/UL (ref 1–4.8)
ABSOLUTE MONO #: 0.4 K/UL (ref 0.1–1.3)
ALBUMIN SERPL-MCNC: 3.7 G/DL (ref 3.5–5.2)
ALBUMIN/GLOBULIN RATIO: ABNORMAL (ref 1–2.5)
ALP BLD-CCNC: 83 U/L (ref 35–104)
ALT SERPL-CCNC: 14 U/L (ref 5–33)
ANION GAP SERPL CALCULATED.3IONS-SCNC: 11 MMOL/L (ref 9–17)
ANION GAP SERPL CALCULATED.3IONS-SCNC: 14 MMOL/L (ref 9–17)
ANION GAP SERPL CALCULATED.3IONS-SCNC: 9 MMOL/L (ref 9–17)
AST SERPL-CCNC: 44 U/L
BASOPHILS # BLD: 1 % (ref 0–2)
BASOPHILS ABSOLUTE: 0.1 K/UL (ref 0–0.2)
BILIRUB SERPL-MCNC: 0.54 MG/DL (ref 0.3–1.2)
BUN BLDV-MCNC: 28 MG/DL (ref 8–23)
BUN BLDV-MCNC: 29 MG/DL (ref 8–23)
BUN/CREAT BLD: ABNORMAL (ref 9–20)
BUN/CREAT BLD: ABNORMAL (ref 9–20)
CALCIUM SERPL-MCNC: 9.1 MG/DL (ref 8.6–10.4)
CALCIUM SERPL-MCNC: 9.3 MG/DL (ref 8.6–10.4)
CHLORIDE BLD-SCNC: 89 MMOL/L (ref 98–107)
CHLORIDE BLD-SCNC: 89 MMOL/L (ref 98–107)
CHLORIDE BLD-SCNC: 91 MMOL/L (ref 98–107)
CHLORIDE, UR: <20 MMOL/L
CO2: 18 MMOL/L (ref 20–31)
CO2: 21 MMOL/L (ref 20–31)
CO2: 22 MMOL/L (ref 20–31)
CREAT SERPL-MCNC: 1.86 MG/DL (ref 0.5–0.9)
CREAT SERPL-MCNC: 2.16 MG/DL (ref 0.5–0.9)
CREATININE URINE: 101.1 MG/DL (ref 28–217)
DIFFERENTIAL TYPE: ABNORMAL
EOSINOPHILS RELATIVE PERCENT: 2 % (ref 0–4)
GFR AFRICAN AMERICAN: 26 ML/MIN
GFR AFRICAN AMERICAN: 31 ML/MIN
GFR NON-AFRICAN AMERICAN: 22 ML/MIN
GFR NON-AFRICAN AMERICAN: 26 ML/MIN
GFR SERPL CREATININE-BSD FRML MDRD: ABNORMAL ML/MIN/{1.73_M2}
GLUCOSE BLD-MCNC: 122 MG/DL (ref 70–99)
GLUCOSE BLD-MCNC: 94 MG/DL (ref 70–99)
HCT VFR BLD CALC: 29.5 % (ref 36–46)
HEMOGLOBIN: 9.7 G/DL (ref 12–16)
IMMATURE GRANULOCYTES: ABNORMAL %
LYMPHOCYTES # BLD: 25 % (ref 24–44)
MCH RBC QN AUTO: 32.9 PG (ref 26–34)
MCHC RBC AUTO-ENTMCNC: 32.9 G/DL (ref 31–37)
MCV RBC AUTO: 100 FL (ref 80–100)
MONOCYTES # BLD: 8 % (ref 1–7)
NRBC AUTOMATED: ABNORMAL PER 100 WBC
PDW BLD-RTO: 17.4 % (ref 11.5–14.9)
PLATELET # BLD: 283 K/UL (ref 150–450)
PLATELET ESTIMATE: ABNORMAL
PMV BLD AUTO: 8.7 FL (ref 6–12)
POTASSIUM SERPL-SCNC: 5 MMOL/L (ref 3.7–5.3)
POTASSIUM SERPL-SCNC: 5.2 MMOL/L (ref 3.7–5.3)
POTASSIUM SERPL-SCNC: 5.3 MMOL/L (ref 3.7–5.3)
RBC # BLD: 2.95 M/UL (ref 4–5.2)
RBC # BLD: ABNORMAL 10*6/UL
SEG NEUTROPHILS: 64 % (ref 36–66)
SEGMENTED NEUTROPHILS ABSOLUTE COUNT: 3.3 K/UL (ref 1.3–9.1)
SODIUM BLD-SCNC: 120 MMOL/L (ref 135–144)
SODIUM BLD-SCNC: 121 MMOL/L (ref 135–144)
SODIUM BLD-SCNC: 123 MMOL/L (ref 135–144)
SODIUM,UR: 35 MMOL/L
TOTAL PROTEIN, URINE: 22 MG/DL
TOTAL PROTEIN: 6.6 G/DL (ref 6.4–8.3)
WBC # BLD: 5.2 K/UL (ref 3.5–11)
WBC # BLD: ABNORMAL 10*3/UL

## 2021-03-25 PROCEDURE — 84300 ASSAY OF URINE SODIUM: CPT

## 2021-03-25 PROCEDURE — 2580000003 HC RX 258: Performed by: STUDENT IN AN ORGANIZED HEALTH CARE EDUCATION/TRAINING PROGRAM

## 2021-03-25 PROCEDURE — 82570 ASSAY OF URINE CREATININE: CPT

## 2021-03-25 PROCEDURE — 82436 ASSAY OF URINE CHLORIDE: CPT

## 2021-03-25 PROCEDURE — 99223 1ST HOSP IP/OBS HIGH 75: CPT | Performed by: INTERNAL MEDICINE

## 2021-03-25 PROCEDURE — 80053 COMPREHEN METABOLIC PANEL: CPT

## 2021-03-25 PROCEDURE — 36415 COLL VENOUS BLD VENIPUNCTURE: CPT

## 2021-03-25 PROCEDURE — 84156 ASSAY OF PROTEIN URINE: CPT

## 2021-03-25 PROCEDURE — 80048 BASIC METABOLIC PNL TOTAL CA: CPT

## 2021-03-25 PROCEDURE — 85025 COMPLETE CBC W/AUTO DIFF WBC: CPT

## 2021-03-25 PROCEDURE — 80051 ELECTROLYTE PANEL: CPT

## 2021-03-25 PROCEDURE — 99284 EMERGENCY DEPT VISIT MOD MDM: CPT

## 2021-03-25 PROCEDURE — G0378 HOSPITAL OBSERVATION PER HR: HCPCS

## 2021-03-25 PROCEDURE — 93005 ELECTROCARDIOGRAM TRACING: CPT | Performed by: STUDENT IN AN ORGANIZED HEALTH CARE EDUCATION/TRAINING PROGRAM

## 2021-03-25 RX ORDER — LIDOCAINE 4 G/G
1 PATCH TOPICAL DAILY
Status: DISCONTINUED | OUTPATIENT
Start: 2021-03-26 | End: 2021-03-26

## 2021-03-25 RX ORDER — FUROSEMIDE 20 MG/1
20 TABLET ORAL DAILY
Status: DISCONTINUED | OUTPATIENT
Start: 2021-03-26 | End: 2021-03-27 | Stop reason: HOSPADM

## 2021-03-25 RX ORDER — IRON POLYSACCHARIDE COMPLEX 150 MG
150 CAPSULE ORAL DAILY
Status: DISCONTINUED | OUTPATIENT
Start: 2021-03-26 | End: 2021-03-27 | Stop reason: HOSPADM

## 2021-03-25 RX ORDER — SODIUM CHLORIDE 0.9 % (FLUSH) 0.9 %
10 SYRINGE (ML) INJECTION EVERY 12 HOURS SCHEDULED
Status: DISCONTINUED | OUTPATIENT
Start: 2021-03-26 | End: 2021-03-27 | Stop reason: HOSPADM

## 2021-03-25 RX ORDER — ACETAMINOPHEN 650 MG/1
650 SUPPOSITORY RECTAL EVERY 6 HOURS PRN
Status: DISCONTINUED | OUTPATIENT
Start: 2021-03-25 | End: 2021-03-27 | Stop reason: HOSPADM

## 2021-03-25 RX ORDER — HYDRALAZINE HYDROCHLORIDE 25 MG/1
25 TABLET, FILM COATED ORAL EVERY 8 HOURS SCHEDULED
Status: DISCONTINUED | OUTPATIENT
Start: 2021-03-26 | End: 2021-03-27 | Stop reason: HOSPADM

## 2021-03-25 RX ORDER — ASPIRIN 81 MG/1
81 TABLET ORAL DAILY
Status: DISCONTINUED | OUTPATIENT
Start: 2021-03-26 | End: 2021-03-27 | Stop reason: HOSPADM

## 2021-03-25 RX ORDER — MAGNESIUM SULFATE 1 G/100ML
1000 INJECTION INTRAVENOUS PRN
Status: DISCONTINUED | OUTPATIENT
Start: 2021-03-25 | End: 2021-03-27 | Stop reason: HOSPADM

## 2021-03-25 RX ORDER — ALLOPURINOL 100 MG/1
100 TABLET ORAL DAILY
Status: DISCONTINUED | OUTPATIENT
Start: 2021-03-26 | End: 2021-03-27 | Stop reason: HOSPADM

## 2021-03-25 RX ORDER — ATORVASTATIN CALCIUM 20 MG/1
20 TABLET, FILM COATED ORAL NIGHTLY
Status: DISCONTINUED | OUTPATIENT
Start: 2021-03-26 | End: 2021-03-27 | Stop reason: HOSPADM

## 2021-03-25 RX ORDER — CLONIDINE HYDROCHLORIDE 0.2 MG/1
0.2 TABLET ORAL NIGHTLY
Status: DISCONTINUED | OUTPATIENT
Start: 2021-03-26 | End: 2021-03-27 | Stop reason: HOSPADM

## 2021-03-25 RX ORDER — SODIUM CHLORIDE 0.9 % (FLUSH) 0.9 %
10 SYRINGE (ML) INJECTION PRN
Status: DISCONTINUED | OUTPATIENT
Start: 2021-03-25 | End: 2021-03-27 | Stop reason: HOSPADM

## 2021-03-25 RX ORDER — PANTOPRAZOLE SODIUM 40 MG/1
40 TABLET, DELAYED RELEASE ORAL
Status: DISCONTINUED | OUTPATIENT
Start: 2021-03-26 | End: 2021-03-27 | Stop reason: HOSPADM

## 2021-03-25 RX ORDER — POLYETHYLENE GLYCOL 3350 17 G/17G
17 POWDER, FOR SOLUTION ORAL DAILY PRN
Status: DISCONTINUED | OUTPATIENT
Start: 2021-03-25 | End: 2021-03-27 | Stop reason: HOSPADM

## 2021-03-25 RX ORDER — M-VIT,TX,IRON,MINS/CALC/FOLIC 27MG-0.4MG
1 TABLET ORAL DAILY
Status: DISCONTINUED | OUTPATIENT
Start: 2021-03-26 | End: 2021-03-27 | Stop reason: HOSPADM

## 2021-03-25 RX ORDER — SODIUM CHLORIDE 9 MG/ML
INJECTION, SOLUTION INTRAVENOUS CONTINUOUS
Status: DISCONTINUED | OUTPATIENT
Start: 2021-03-25 | End: 2021-03-26

## 2021-03-25 RX ORDER — ONDANSETRON 2 MG/ML
4 INJECTION INTRAMUSCULAR; INTRAVENOUS EVERY 6 HOURS PRN
Status: DISCONTINUED | OUTPATIENT
Start: 2021-03-25 | End: 2021-03-27 | Stop reason: HOSPADM

## 2021-03-25 RX ORDER — VITS A,C,E/LUTEIN/MINERALS 300MCG-200
1 TABLET ORAL DAILY
Status: DISCONTINUED | OUTPATIENT
Start: 2021-03-26 | End: 2021-03-27 | Stop reason: HOSPADM

## 2021-03-25 RX ORDER — SODIUM CHLORIDE 1000 MG
1 TABLET, SOLUBLE MISCELLANEOUS 3 TIMES DAILY
Status: DISCONTINUED | OUTPATIENT
Start: 2021-03-26 | End: 2021-03-27 | Stop reason: HOSPADM

## 2021-03-25 RX ORDER — ACETAMINOPHEN 325 MG/1
650 TABLET ORAL EVERY 6 HOURS PRN
Status: DISCONTINUED | OUTPATIENT
Start: 2021-03-25 | End: 2021-03-27 | Stop reason: HOSPADM

## 2021-03-25 RX ORDER — FUROSEMIDE 20 MG/1
20 TABLET ORAL DAILY
COMMUNITY
End: 2021-03-30 | Stop reason: SDUPTHER

## 2021-03-25 RX ORDER — POTASSIUM CHLORIDE 20 MEQ/1
40 TABLET, EXTENDED RELEASE ORAL PRN
Status: DISCONTINUED | OUTPATIENT
Start: 2021-03-25 | End: 2021-03-27 | Stop reason: HOSPADM

## 2021-03-25 RX ORDER — PROMETHAZINE HYDROCHLORIDE 25 MG/1
12.5 TABLET ORAL EVERY 6 HOURS PRN
Status: DISCONTINUED | OUTPATIENT
Start: 2021-03-25 | End: 2021-03-27 | Stop reason: HOSPADM

## 2021-03-25 RX ORDER — POTASSIUM CHLORIDE 7.45 MG/ML
10 INJECTION INTRAVENOUS PRN
Status: DISCONTINUED | OUTPATIENT
Start: 2021-03-25 | End: 2021-03-27 | Stop reason: HOSPADM

## 2021-03-25 RX ADMIN — SODIUM CHLORIDE: 9 INJECTION, SOLUTION INTRAVENOUS at 22:13

## 2021-03-25 ASSESSMENT — PAIN DESCRIPTION - ORIENTATION: ORIENTATION: LOWER

## 2021-03-25 ASSESSMENT — PAIN DESCRIPTION - FREQUENCY: FREQUENCY: CONTINUOUS

## 2021-03-25 ASSESSMENT — PAIN SCALES - GENERAL: PAINLEVEL_OUTOF10: 2

## 2021-03-25 ASSESSMENT — PAIN DESCRIPTION - LOCATION: LOCATION: BACK

## 2021-03-25 NOTE — RESULT ENCOUNTER NOTE
ABNORMAL.  Please notify patient to call Dr. Trever Clay as sodium is staying relatively stable but worsening, 120, despite her taking the sodium pills 1 g 3 times a day, I have prescribed to her already    Future Appointments  4/12/2021  2:00 PM    [facility] [address]  5/27/2021  11:40 AM   Josey Valerio MD     [facility]   [facility]  7/28/2021  3:45 PM    Cristel Smart MD     fp [address]

## 2021-03-25 NOTE — RESULT ENCOUNTER NOTE
ABNORMAL. Please notify patient.   Please advise the patient limits 121 to call Dr. Eri Tavera office    Chronic kidney disease stage IV stable      Future Appointments  4/12/2021  2:00 PM    [address]  5/27/2021  11:40 AM   Cesar Riggs MD     [facility]   [facility]  7/28/2021  3:45 PM    Trevon Maradiaga MD     fp sc [address]

## 2021-03-26 PROBLEM — I10 ESSENTIAL HYPERTENSION: Status: ACTIVE | Noted: 2021-03-26

## 2021-03-26 LAB
ANION GAP SERPL CALCULATED.3IONS-SCNC: 10 MMOL/L (ref 9–17)
ANION GAP SERPL CALCULATED.3IONS-SCNC: 12 MMOL/L (ref 9–17)
ANION GAP SERPL CALCULATED.3IONS-SCNC: 7 MMOL/L (ref 9–17)
BUN BLDV-MCNC: 24 MG/DL (ref 8–23)
BUN BLDV-MCNC: 24 MG/DL (ref 8–23)
BUN BLDV-MCNC: 26 MG/DL (ref 8–23)
BUN/CREAT BLD: ABNORMAL (ref 9–20)
CALCIUM SERPL-MCNC: 8.5 MG/DL (ref 8.6–10.4)
CALCIUM SERPL-MCNC: 8.6 MG/DL (ref 8.6–10.4)
CALCIUM SERPL-MCNC: 8.7 MG/DL (ref 8.6–10.4)
CHLORIDE BLD-SCNC: 98 MMOL/L (ref 98–107)
CHLORIDE BLD-SCNC: 98 MMOL/L (ref 98–107)
CHLORIDE BLD-SCNC: 99 MMOL/L (ref 98–107)
CO2: 18 MMOL/L (ref 20–31)
CO2: 19 MMOL/L (ref 20–31)
CO2: 19 MMOL/L (ref 20–31)
CREAT SERPL-MCNC: 1.74 MG/DL (ref 0.5–0.9)
CREAT SERPL-MCNC: 1.9 MG/DL (ref 0.5–0.9)
CREAT SERPL-MCNC: 1.93 MG/DL (ref 0.5–0.9)
EKG ATRIAL RATE: 163 BPM
EKG P AXIS: 21 DEGREES
EKG P-R INTERVAL: 206 MS
EKG Q-T INTERVAL: 186 MS
EKG QRS DURATION: 88 MS
EKG QTC CALCULATION (BAZETT): 306 MS
EKG R AXIS: -39 DEGREES
EKG T AXIS: 39 DEGREES
EKG VENTRICULAR RATE: 163 BPM
GFR AFRICAN AMERICAN: 30 ML/MIN
GFR AFRICAN AMERICAN: 31 ML/MIN
GFR AFRICAN AMERICAN: 34 ML/MIN
GFR NON-AFRICAN AMERICAN: 25 ML/MIN
GFR NON-AFRICAN AMERICAN: 25 ML/MIN
GFR NON-AFRICAN AMERICAN: 28 ML/MIN
GFR SERPL CREATININE-BSD FRML MDRD: ABNORMAL ML/MIN/{1.73_M2}
GLUCOSE BLD-MCNC: 116 MG/DL (ref 70–99)
GLUCOSE BLD-MCNC: 80 MG/DL (ref 70–99)
GLUCOSE BLD-MCNC: 98 MG/DL (ref 70–99)
HCT VFR BLD CALC: 25.4 % (ref 36–46)
HEMOGLOBIN: 8.5 G/DL (ref 12–16)
INR BLD: 1.1
MCH RBC QN AUTO: 33.6 PG (ref 26–34)
MCHC RBC AUTO-ENTMCNC: 33.6 G/DL (ref 31–37)
MCV RBC AUTO: 99.9 FL (ref 80–100)
NRBC AUTOMATED: ABNORMAL PER 100 WBC
PDW BLD-RTO: 17 % (ref 11.5–14.9)
PLATELET # BLD: 236 K/UL (ref 150–450)
PMV BLD AUTO: 8.1 FL (ref 6–12)
POTASSIUM SERPL-SCNC: 4.7 MMOL/L (ref 3.7–5.3)
POTASSIUM SERPL-SCNC: 4.8 MMOL/L (ref 3.7–5.3)
PROTHROMBIN TIME: 13.9 SEC (ref 11.8–14.6)
RBC # BLD: 2.55 M/UL (ref 4–5.2)
SODIUM BLD-SCNC: 125 MMOL/L (ref 135–144)
SODIUM BLD-SCNC: 126 MMOL/L (ref 135–144)
SODIUM BLD-SCNC: 129 MMOL/L (ref 135–144)
WBC # BLD: 4.9 K/UL (ref 3.5–11)

## 2021-03-26 PROCEDURE — 36415 COLL VENOUS BLD VENIPUNCTURE: CPT

## 2021-03-26 PROCEDURE — 85610 PROTHROMBIN TIME: CPT

## 2021-03-26 PROCEDURE — 99239 HOSP IP/OBS DSCHRG MGMT >30: CPT | Performed by: INTERNAL MEDICINE

## 2021-03-26 PROCEDURE — G0378 HOSPITAL OBSERVATION PER HR: HCPCS

## 2021-03-26 PROCEDURE — 80048 BASIC METABOLIC PNL TOTAL CA: CPT

## 2021-03-26 PROCEDURE — 2580000003 HC RX 258: Performed by: INTERNAL MEDICINE

## 2021-03-26 PROCEDURE — 6370000000 HC RX 637 (ALT 250 FOR IP): Performed by: NURSE PRACTITIONER

## 2021-03-26 PROCEDURE — 93010 ELECTROCARDIOGRAM REPORT: CPT | Performed by: INTERNAL MEDICINE

## 2021-03-26 PROCEDURE — 84132 ASSAY OF SERUM POTASSIUM: CPT

## 2021-03-26 PROCEDURE — 85027 COMPLETE CBC AUTOMATED: CPT

## 2021-03-26 RX ORDER — LIDOCAINE 50 MG/G
1 PATCH TOPICAL DAILY
Status: DISCONTINUED | OUTPATIENT
Start: 2021-03-26 | End: 2021-03-27 | Stop reason: HOSPADM

## 2021-03-26 RX ORDER — SODIUM CHLORIDE 9 MG/ML
INJECTION, SOLUTION INTRAVENOUS CONTINUOUS
Status: DISCONTINUED | OUTPATIENT
Start: 2021-03-26 | End: 2021-03-27 | Stop reason: HOSPADM

## 2021-03-26 RX ORDER — SODIUM CHLORIDE 1000 MG
1 TABLET, SOLUBLE MISCELLANEOUS 3 TIMES DAILY
Qty: 90 TABLET | Refills: 0 | Status: CANCELLED | OUTPATIENT
Start: 2021-03-26

## 2021-03-26 RX ORDER — POLYETHYLENE GLYCOL 3350 17 G/17G
17 POWDER, FOR SOLUTION ORAL DAILY PRN
Qty: 527 G | Refills: 1 | OUTPATIENT
Start: 2021-03-26 | End: 2021-04-25

## 2021-03-26 RX ADMIN — Medication 1 G: at 20:57

## 2021-03-26 RX ADMIN — SODIUM CHLORIDE: 9 INJECTION, SOLUTION INTRAVENOUS at 19:09

## 2021-03-26 ASSESSMENT — ENCOUNTER SYMPTOMS
SHORTNESS OF BREATH: 0
SORE THROAT: 0
CONSTIPATION: 0
VOMITING: 0
RHINORRHEA: 0
ABDOMINAL PAIN: 0
NAUSEA: 0
BACK PAIN: 0
DIARRHEA: 0
WHEEZING: 0
COUGH: 0

## 2021-03-26 NOTE — H&P
[address] [facility]     HISTORY AND PHYSICAL EXAMINATION            Date:   3/26/2021  Patientname:  Lord Gibbons  Date of admission:  3/25/2021  8:12 PM  MRN:   [id number]  Account:  [de-identified]  YOB: 1935  PCP:    Georges Barth MD  Room:   2107/2107-01  Code Status:    Full Code    CHIEF COMPLAINT     Chief Complaint   Patient presents with    Abnormal Lab     low sodium     Fatigue       HISTORY OF PRESENT ILLNESS  (Character, Onset, Location, Duration,  Exacerbating/RelievingFactors, Radiation,   Associated Symptoms, Severity )      The patient is a 81 y.o.  female, with a history of GERD, hiatal hernia, CKD stage III, HTN, and hyponatremia, who presents with abnormal lab (low sodium) and fatigue. According to patient, she had lab work drawn at the request of her nephrologist earlier in the day, then received a call telling her to come to the ED for hyponatremia. Patient reports that she has had this problem in the past and was recently started on sodium chloride tablets 3 times daily. Symptoms are associated with fatigue, which patient relates to recent Covid vaccine. Denies fever, chills, chest pain, cough, abdominal pain, nausea, vomiting, diarrhea, and urinary symptoms. There are no aggravating or alleviating factors. Symptoms are reported as constant and mild. PAST MEDICAL HISTORY   Patient  has a past medical history of CKD (chronic kidney disease) stage 3, GFR 30-59 ml/min, Depression, GERD (gastroesophageal reflux disease), Gout, Hyperlipidemia, Hypertension, Hyponatremia, Metabolic acidosis, Osteoporosis, and Secondary hyperparathyroidism ([facility] [address] PAST SURGICAL HISTORY    Patient  has a past surgical history that includes bladder suspension; Total knee arthroplasty (Bilateral); Hysterectomy; Total hip arthroplasty (Bilateral); Total shoulder arthroplasty (Bilateral); Appendectomy; and eye surgery.      FAMILY HISTORY    Patient family history includes Cancer in her father and mother; Emphysema in her mother; Heart Disease in her maternal grandfather and paternal grandfather. SOCIAL HISTORY    Patient  reports that she has never smoked. She has never used smokeless tobacco. She reports current alcohol use of about 2.0 standard drinks of alcohol per week. She reports that she does not use drugs. HOME MEDICATIONS        Prior to Admission medications    Medication Sig Start Date End Date Taking?  Authorizing Provider   furosemide (LASIX) 20 MG tablet Take 20 mg by mouth daily   Yes Historical Provider, MD   Multiple Vitamins-Minerals (CENTRUM SILVER 50+WOMEN PO) Take 1 tablet by mouth   Yes Historical Provider, MD   sodium chloride 1 g tablet Take 1 tablet by mouth 3 times daily 3/22/21  Yes Bud Massey MD   allopurinol (ZYLOPRIM) 100 MG tablet Take 1 tablet by mouth daily 3/22/21  Yes Bud Massey MD   cloNIDine (CATAPRES) 0.2 MG tablet Take 1 tablet by mouth nightly 3/22/21  Yes Bud Massey MD   omeprazole (PRILOSEC) 20 MG delayed release capsule Take 1 capsule by mouth daily 3/22/21  Yes Bud Massey MD   atorvastatin (LIPITOR) 20 MG tablet Take 1 tablet by mouth daily  Patient taking differently: Take 20 mg by mouth nightly  3/22/21  Yes Bud Massey MD   hydrALAZINE (APRESOLINE) 25 MG tablet TAKE 1 TABLET THREE TIMES A DAY 3/19/21  Yes ELIZABET Mills CNP   iron polysaccharides (NIFEREX) 150 MG capsule Take 1 capsule by mouth daily 3/15/21  Yes ELIZABET Mills CNP   lidocaine (LIDODERM) 5 % Place 1 patch onto the skin daily 12 hours on, 12 hours off. 3/15/21 4/14/21 Yes ELIZABET Mills CNP   Magnesium Oxide (MAGNESIUM-OXIDE) 250 MG TABS tablet Take 1 tablet by mouth 2 times daily 3/15/21 4/14/21 Yes ELIZABET Mills CNP   aspirin 81 MG tablet Take 1 tablet by mouth daily 6/15/18  Yes Jaylon Bowman MD   Multiple Vitamins-Minerals (OCUVITE ADULT 50+ PO) Take 1 tablet by mouth daily   Yes Historical Provider, MD   Handicap Placard MISC by Does not apply route Expires March 2026 3/15/21   Randi Coughlin, APRN - CNP       ALLERGIES      Norvasc [amlodipine besylate]    REVIEW OF SYSTEMS     Review of Systems   Constitutional: Positive for fatigue. Negative for chills, diaphoresis and fever. HENT: Negative for congestion and sore throat. Respiratory: Negative for cough, shortness of breath and wheezing. Cardiovascular: Negative for chest pain, palpitations and leg swelling. Gastrointestinal: Negative for abdominal pain, constipation, diarrhea, nausea and vomiting. Genitourinary: Negative for dysuria, frequency and urgency. Musculoskeletal: Negative for back pain and myalgias. Skin: Negative for rash. Neurological: Negative for dizziness, weakness and headaches. Psychiatric/Behavioral: The patient is not nervous/anxious. PHYSICAL EXAM      BP (!) 142/74   Pulse 92   Temp 97.5 °F (36.4 °C) (Oral)   Resp 14   Ht 5' 3\" (1.6 m)   Wt 149 lb 14.6 oz (68 kg)   SpO2 98%   BMI 26.56 kg/m²  Body mass index is 26.56 kg/m². Physical Exam  Constitutional:       General: She is not in acute distress. Appearance: She is well-developed. She is not diaphoretic. HENT:      Head: Normocephalic and atraumatic. Eyes:      Conjunctiva/sclera: Conjunctivae normal.      Pupils: Pupils are equal, round, and reactive to light. Neck:      Musculoskeletal: Normal range of motion and neck supple. Trachea: No tracheal deviation. Cardiovascular:      Rate and Rhythm: Normal rate and regular rhythm. Heart sounds: Normal heart sounds. No murmur. No friction rub. No gallop. Pulmonary:      Effort: Pulmonary effort is normal. No respiratory distress. Breath sounds: Normal breath sounds. No wheezing or rales. Chest:      Chest wall: No tenderness. Abdominal:      General: Bowel sounds are normal. There is no distension. Palpations: Abdomen is soft. Tenderness: There is no abdominal tenderness. There is no guarding. Musculoskeletal: Normal range of motion. General: No tenderness. Lymphadenopathy:      Cervical: No cervical adenopathy. Skin:     General: Skin is warm and dry. Coloration: Skin is not pale. Findings: No erythema or rash. Neurological:      Mental Status: She is alert and oriented to person, place, and time. Motor: No seizure activity. Coordination: Coordination normal.   Psychiatric:         Behavior: Behavior normal.         Thought Content: Thought content normal.       DIAGNOSTICS      EKG: (as documented in ED note):  EKG reviewed, sinus rhythm, EKG is reading sinus tachycardia, rate is in the sinus tach, slight the QRS and T waves are being counted, left axis deviation, no significant ST segment elevation or depression, nonspecific T wave changes    Labs:  CBC:   Recent Labs     03/25/21 2020 03/26/21 0521   WBC 5.2 4.9   HGB 9.7* 8.5*    236     BMP:    Recent Labs     03/25/21  1255 03/25/21  1256 03/25/21 2020 03/26/21  0521   * 120* 123* 129*   K 5.2 5.0 5.3 4.7   CL 89* 89* 91* 98   CO2 21 22 18* 19*   BUN 28*  --  29* 26*   CREATININE 1.86*  --  2.16* 1.93*   GLUCOSE 94  --  122* 80     S. Calcium:  Recent Labs     03/26/21 0521   CALCIUM 8.6     S. Ionized Calcium:No results for input(s): IONCA in the last 72 hours. S. Magnesium:No results for input(s): MG in the last 72 hours. S. Phosphorus:No results for input(s): PHOS in the last 72 hours. S. Glucose:No results for input(s): POCGLU in the last 72 hours. Glycosylated hemoglobin A1C:   Lab Results   Component Value Date    LABA1C 4.9 08/14/2020     Hepatic:   Recent Labs     03/25/21 2020   AST 44*   ALT 14   ALKPHOS 83     CARDIAC ENZY: No results for input(s): CKTOTAL, CKMB, CKMBINDEX, TROPHS, MYOGLOBIN in the last 72 hours.   INR:   Recent Labs     03/26/21 0521   INR 1.1     BNP: No results for input(s): PROBNP in the last 72 hours. ABGs: No results for input(s): PH, PCO2, PO2, HCO3, O2SAT in the last 72 hours. Lipids: No results for input(s): CHOL, TRIG, HDL, LDLCALC in the last 72 hours. Invalid input(s): LDL  Pancreatic functions:No results for input(s): LIPASE, AMYLASE in the last 72 hours. Maxwell Iba: No results for input(s): LACTA in the last 72 hours. Thyroid functions:   Lab Results   Component Value Date    TSH 3.31 08/23/2020      U/A:No results for input(s): NITRITE, COLORU, WBCUA, RBCUA, MUCUS, BACTERIA, CLARITYU, SPECGRAV, LEUKOCYTESUR, BLOODU, GLUCOSEU, AMORPHOUS in the last 72 hours. Invalid input(s): Felicia Muñoz    Imaging/Diagonstics:     No results found. ASSESSMENT  and  PLAN     Principal Problem:    Hyponatremia  Active Problems:    CKD (chronic kidney disease) stage 3, GFR 30-59 ml/min ([facility])  Resolved Problems:    * No resolved hospital problems. *    Plan:    Hyponatremia  -Sodium level - 123 on arrival; was 120 earlier todday  -check urine sodium   -nephrology consulted in ED  --check sodium level every 6 hours   --Call results to nephrology  ---IV NS at 110ml/hr   -Continue home dose of sodiunm chloride tablets    CKD  -Patient with history of CKD Stage 3  -Creatinine: 2.16  ---Baseline: near 1.4    Consultations:     Femi Hurtado 58, APRN - CNP   3/26/2021  6:05 AM    Jigar [address] [place] [address], [address] [place] [address]. Phone  and add on       I have discussed the care of [facility] , including pertinent history and exam findings,      3/26/21    with the resident. I have seen and examined the patient and the key elements of all parts of the encounter have been performed by me . I agree with the assessment, plan and orders as documented by the resident.      Principal Problem:    Hyponatremia  Active Problems:    Gout    CKD (chronic kidney disease) stage 3, GFR 30-59 ml/min (HCC)    Anemia    Chronic midline low back pain without sciatica    Essential hypertension  Resolved Problems:    * No resolved hospital problems. *            ---- ;        Medications: Allergies: Allergies   Allergen Reactions    Norvasc [Amlodipine Besylate] Other (See Comments)     Edema       Current Meds:   Scheduled Meds:    lidocaine  1 patch Transdermal Daily    allopurinol  100 mg Oral Daily    aspirin  81 mg Oral Daily    atorvastatin  20 mg Oral Nightly    cloNIDine  0.2 mg Oral Nightly    hydrALAZINE  25 mg Oral 3 times per day    iron polysaccharides  150 mg Oral Daily    magnesium oxide  200 mg Oral BID    sodium chloride  1 g Oral TID    pantoprazole  40 mg Oral QAM AC    ocuvite-lutein  1 tablet Oral Daily    therapeutic multivitamin-minerals  1 tablet Oral Daily    furosemide  20 mg Oral Daily    sodium chloride flush  10 mL Intravenous 2 times per day    enoxaparin  30 mg Subcutaneous Daily     Continuous Infusions:   PRN Meds: sodium chloride flush, potassium chloride **OR** potassium alternative oral replacement **OR** potassium chloride, magnesium sulfate, promethazine **OR** ondansetron, polyethylene glycol, acetaminophen **OR** acetaminophen        [facility] [address], [address] [place] [address].    Phone [phone]   Fax: [phone]  Answering Service: [phone]

## 2021-03-26 NOTE — RESULT ENCOUNTER NOTE
Addressed in ED currently admitted, hyponatremia, worsening anemia    Future Appointments  4/12/2021  2:00 PM    [facility] [address], PFIZER * [facility] COVID    [facility]  5/27/2021  11:40 AM   Renate Hall MD     [facility]   [facility]  7/28/2021  3:45 PM    Jackie Simpson MD     fp sc          [facility]

## 2021-03-26 NOTE — ED PROVIDER NOTES
[address]  Emergency Department Encounter  EmergencyMedicine Resident     Pt Name:Anita Costa  MRN: [id number]  Birthdate 1935  Date of evaluation: 3/25/21  PCP:  Jackie Simpson MD    [address] [place] [address]       Chief Complaint   Patient presents with    Abnormal Lab     low sodium     Fatigue       HISTORY OF PRESENT ILLNESS  (Location/Symptom, Timing/Onset, Context/Setting, Quality, Duration, Modifying Factors, Severity.)      Indra Gallardo is a 81 y.o. female who presents with plaint of fatigue since Monday of this week as and found to have hyponatremia on lab work. Patient has been followed at our nephrology for this and has been started on salt tabs started him on Monday she has been taking them daily has not missed any doses however her sodium continues to downtrend she has not been nauseous or vomiting having chest pain or shortness of breath has not been ataxic or dizzy denies any headaches or history of any seizures she is making urine has CKD stage IV. She incidentally also received her first dose of the Covid vaccine on Monday as well. PAST MEDICAL / SURGICAL / SOCIAL / FAMILY HISTORY      has a past medical history of CKD (chronic kidney disease) stage 3, GFR 30-59 ml/min, Depression, GERD (gastroesophageal reflux disease), Gout, Hyperlipidemia, Hypertension, Hyponatremia, Metabolic acidosis, Osteoporosis, and Secondary hyperparathyroidism ([address] has a past surgical history that includes bladder suspension; Total knee arthroplasty (Bilateral); Hysterectomy; Total hip arthroplasty (Bilateral); Total shoulder arthroplasty (Bilateral); Appendectomy; and eye surgery. Social History     Socioeconomic History    Marital status:       Spouse name: Not on file    Number of children: Not on file    Years of education: Not on file    Highest education level: Not on file   Occupational History    Not on file   Social Needs    Financial resource strain: Not on file    Food insecurity     Worry: Not on file     Inability: Not on file    Transportation needs     Medical: Not on file     Non-medical: Not on file   Tobacco Use    Smoking status: Never Smoker    Smokeless tobacco: Never Used   Substance and Sexual Activity    Alcohol use: Yes     Alcohol/week: 2.0 standard drinks     Types: 2 Standard drinks or equivalent per week     Comment: occasionally    Drug use: No    Sexual activity: Not on file   Lifestyle    Physical activity     Days per week: Not on file     Minutes per session: Not on file    Stress: Not on file   Relationships    Social connections     Talks on phone: Not on file     Gets together: Not on file     Attends [employer] service: Not on file     Active member of club or organization: Not on file     Attends meetings of clubs or organizations: Not on file     Relationship status: Not on file    Intimate partner violence     Fear of current or ex partner: Not on file     Emotionally abused: Not on file     Physically abused: Not on file     Forced sexual activity: Not on file   Other Topics Concern    Not on file   Social History Narrative    Not on file       Family History   Problem Relation Age of Onset    Cancer Mother         breast cancer    Emphysema Mother     Cancer Father         Prostate cancer    Heart Disease Maternal Grandfather     Heart Disease Paternal Grandfather        Allergies:  Norvasc [amlodipine besylate]    Home Medications:  Prior to Admission medications    Medication Sig Start Date End Date Taking?  Authorizing Provider   sodium chloride 1 g tablet Take 1 tablet by mouth 3 times daily 3/22/21   Siomara Mcbride MD   allopurinol (ZYLOPRIM) 100 MG tablet Take 1 tablet by mouth daily 3/22/21   Siomara Mcbride MD   cloNIDine (CATAPRES) 0.2 MG tablet Take 1 tablet by mouth nightly 3/22/21   Siomara Mcbride MD   omeprazole (PRILOSEC) 20 MG delayed release capsule Take 1 capsule by mouth daily 3/22/21   Coty Aldrich MD   atorvastatin (LIPITOR) 20 MG tablet Take 1 tablet by mouth daily 3/22/21   Coty Aldrich MD   hydrALAZINE (APRESOLINE) 25 MG tablet TAKE 1 TABLET THREE TIMES A DAY 3/19/21   ELIZABET Mills CNP   iron polysaccharides (NIFEREX) 150 MG capsule Take 1 capsule by mouth daily 3/15/21   ELIZABET Mills CNP   lidocaine (LIDODERM) 5 % Place 1 patch onto the skin daily 12 hours on, 12 hours off. 3/15/21 4/14/21  ELIZABET Mills CNP   Handicap Placard MISC by Does not apply route Expires March 2026 3/15/21   ELIZABET Mills CNP   Magnesium Oxide (MAGNESIUM-OXIDE) 250 MG TABS tablet Take 1 tablet by mouth 2 times daily 3/15/21 4/14/21  ELIZABET Mills CNP   neomycin-polymyxin-hydrocortisone (CORTISPORIN) 3.5-76132-1 otic suspension Place 3 drops into the right ear 4 times daily For 10 days; Started 8/13/2020    Historical Provider, MD   aspirin 81 MG tablet Take 1 tablet by mouth daily 6/15/18   Fan Mayo MD   Multiple Vitamins-Minerals (OCUVITE ADULT 50+ PO) Take 1 tablet by mouth daily    Historical Provider, MD       REVIEW OF SYSTEMS    (2-9 systems for level 4, 10 or more for level 5)      Review of Systems   Constitutional: Positive for fatigue. HENT: Negative for congestion, rhinorrhea and sore throat. Respiratory: Negative for shortness of breath. Cardiovascular: Negative for chest pain. Gastrointestinal: Negative for abdominal pain. Genitourinary: Negative for dysuria. Musculoskeletal: Negative for gait problem. Neurological: Positive for weakness. Negative for seizures, syncope and headaches.        PHYSICAL EXAM   (up to 7 for level 4, 8 or more for level 5)      INITIAL VITALS:   BP (!) 159/110   Pulse 94   Temp 97.7 °F (36.5 °C) (Oral)   Resp 12   Ht 5' 3\" (1.6 m)   Wt 147 lb 14.4 oz (67.1 kg)   SpO2 99%   BMI 26.20 kg/m²     Physical Exam  Constitutional:       General: She is not in acute distress. Appearance: Normal appearance. She is not ill-appearing, toxic-appearing or diaphoretic. HENT:      Head: Normocephalic. Right Ear: External ear normal.      Left Ear: External ear normal.      Nose: Nose normal.      Mouth/Throat:      Mouth: Mucous membranes are moist.   Eyes:      Conjunctiva/sclera: Conjunctivae normal.   Neck:      Musculoskeletal: Normal range of motion. Cardiovascular:      Rate and Rhythm: Normal rate. Pulses: Normal pulses. Pulmonary:      Effort: Pulmonary effort is normal. No respiratory distress. Abdominal:      Palpations: Abdomen is soft. Musculoskeletal: Normal range of motion. Skin:     General: Skin is warm. Capillary Refill: Capillary refill takes less than 2 seconds. Neurological:      Mental Status: She is alert and oriented to person, place, and time.    Psychiatric:         Mood and Affect: Mood normal.         DIFFERENTIAL  DIAGNOSIS     PLAN (LABS / IMAGING / EKG):  Orders Placed This Encounter   Procedures    CBC with DIFF    Comprehensive Metabolic Panel    SODIUM, URINE, RANDOM    CHLORIDE, URINE, RANDOM    PROTEIN, URINE, RANDOM    CREATININE, RANDOM URINE    Basic Metabolic Prof    Basic Metabolic Panel w/ Reflex to MG    CBC    Protime-INR    DIET GENERAL;    Telemetry Monitoring    Vital signs per unit routine    Notify physician    Up with assistance    Daily weights    Intake and output    Place intermittent pneumatic compression device    Full Code    Inpatient consult to Nephrology    Inpatient consult to Internal Medicine    Initiate Oxygen Therapy Protocol    Pulse Oximetry Spot Check    EKG 12 Lead    PATIENT STATUS (FROM ED OR OR/PROCEDURAL) Observation       MEDICATIONS ORDERED:  Orders Placed This Encounter   Medications    0.9 % sodium chloride infusion    allopurinol (ZYLOPRIM) tablet 100 mg    aspirin tablet 81 mg    atorvastatin (LIPITOR) tablet 20 mg    cloNIDine (CATAPRES) tablet 0.2 mg    hydrALAZINE (APRESOLINE) tablet 25 mg    iron polysaccharides (NIFEREX) capsule 150 mg    Magnesium Oxide (MAGNESIUM-OXIDE) 250 mg    sodium chloride tablet 1 g    pantoprazole (PROTONIX) tablet 40 mg    Ocuvite Adult 50+ CAPS 1 tablet    Centrum Silver 50+Women TABS 1 tablet    lidocaine 4 % external patch 1 patch    furosemide (LASIX) tablet 20 mg    sodium chloride flush 0.9 % injection 10 mL    sodium chloride flush 0.9 % injection 10 mL    OR Linked Order Group     potassium chloride (KLOR-CON M) extended release tablet 40 mEq     potassium bicarb-citric acid (EFFER-K) effervescent tablet 40 mEq     potassium chloride 10 mEq/100 mL IVPB (Peripheral Line)    magnesium sulfate 1000 mg in dextrose 5% 100 mL IVPB    enoxaparin (LOVENOX) injection 30 mg    OR Linked Order Group     promethazine (PHENERGAN) tablet 12.5 mg     ondansetron (ZOFRAN) injection 4 mg    polyethylene glycol (GLYCOLAX) packet 17 g    OR Linked Order Group     acetaminophen (TYLENOL) tablet 650 mg     acetaminophen (TYLENOL) suppository 650 mg       DDX: hyponatremia    DIAGNOSTIC RESULTS / EMERGENCY DEPARTMENT COURSE / MDM   LAB RESULTS:  Results for orders placed or performed during the hospital encounter of 03/25/21   CBC with DIFF   Result Value Ref Range    WBC 5.2 3.5 - 11.0 k/uL    RBC 2.95 (L) 4.0 - 5.2 m/uL    Hemoglobin 9.7 (L) 12.0 - 16.0 g/dL    Hematocrit 29.5 (L) 36 - 46 %    .0 80 - 100 fL    MCH 32.9 26 - 34 pg    MCHC 32.9 31 - 37 g/dL    RDW 17.4 (H) 11.5 - 14.9 %    Platelets 882 324 - 317 k/uL    MPV 8.7 6.0 - 12.0 fL    NRBC Automated NOT REPORTED per 100 WBC    Differential Type NOT REPORTED     Immature Granulocytes NOT REPORTED 0 %    Absolute Immature Granulocyte NOT REPORTED 0.00 - 0.30 k/uL    WBC Morphology NOT REPORTED     RBC Morphology NOT REPORTED     Platelet Estimate NOT REPORTED     Seg Neutrophils 64 36 - 66 %    Lymphocytes 25 24 - 44 %    Monocytes 8 (H) 1 - 7 % Eosinophils % 2 0 - 4 %    Basophils 1 0 - 2 %    Segs Absolute 3.30 1.3 - 9.1 k/uL    Absolute Lymph # 1.30 1.0 - 4.8 k/uL    Absolute Mono # 0.40 0.1 - 1.3 k/uL    Absolute Eos # 0.10 0.0 - 0.4 k/uL    Basophils Absolute 0.10 0.0 - 0.2 k/uL   Comprehensive Metabolic Panel   Result Value Ref Range    Glucose 122 (H) 70 - 99 mg/dL    BUN 29 (H) 8 - 23 mg/dL    CREATININE 2.16 (H) 0.50 - 0.90 mg/dL    Bun/Cre Ratio NOT REPORTED 9 - 20    Calcium 9.1 8.6 - 10.4 mg/dL    Sodium 123 (L) 135 - 144 mmol/L    Potassium 5.3 3.7 - 5.3 mmol/L    Chloride 91 (L) 98 - 107 mmol/L    CO2 18 (L) 20 - 31 mmol/L    Anion Gap 14 9 - 17 mmol/L    Alkaline Phosphatase 83 35 - 104 U/L    ALT 14 5 - 33 U/L    AST 44 (H) <32 U/L    Total Bilirubin 0.54 0.3 - 1.2 mg/dL    Total Protein 6.6 6.4 - 8.3 g/dL    Albumin 3.7 3.5 - 5.2 g/dL    Albumin/Globulin Ratio NOT REPORTED 1.0 - 2.5    GFR Non-African American 22 (L) >60 mL/min    GFR  26 (L) >60 mL/min    GFR Comment          GFR Staging NOT REPORTED    SODIUM, URINE, RANDOM   Result Value Ref Range    Sodium,Ur 35 mmol/L   CHLORIDE, URINE, RANDOM   Result Value Ref Range    Chloride, Ur <20 mmol/L   PROTEIN, URINE, RANDOM   Result Value Ref Range    Total Protein, Urine 22 mg/dL   CREATININE, RANDOM URINE   Result Value Ref Range    Creatinine, Ur 101.1 28.0 - 217.0 mg/dL   EKG 12 Lead   Result Value Ref Range    Ventricular Rate 163 BPM    Atrial Rate 163 BPM    P-R Interval 206 ms    QRS Duration 88 ms    Q-T Interval 186 ms    QTc Calculation (Bazett) 306 ms    P Axis 21 degrees    R Axis -39 degrees    T Axis 39 degrees       IMPRESSION: Alert oriented pleasant 60-year-old female no acute distress presenting at the urging of her nephrologist for abnormalities on her lab work suggestive of hyponatremia after failing outpatient management with salt tabs.   Plan will be repeat labs urinalysis urine lites consultation to nephrology    RADIOLOGY:  No results found.      EKG  Sinus tachycardia with PVCs there is leftward axis poor R wave progression transition point in V5 QTC of 316 prolonged NH interval 206    All EKG's are interpreted by the Emergency Department Physician who either signs or Co-signs this chart in the absence of a cardiologist.    EMERGENCY DEPARTMENT COURSE:  ED Course as of Mar 26 0019   Thu Mar 25, 2021   2028 Seen and evaluated workup initiated    [BG]   2119 Call placed to dr. Anjelica Jane    [BG]   2140 [address] with dr Anjelica Jane Recommending NS @ maintenance and q6 hour electrolytes and notification of results    [BG]      ED Course User Index  [BG] Kevyn Cardenas DO         PROCEDURES:  none    CONSULTS:  IP CONSULT TO NEPHROLOGY  IP CONSULT TO INTERNAL MEDICINE    CRITICAL CARE:  Please see attending note    FINAL IMPRESSION      1. Hyponatremia          DISPOSITION / PLAN     DISPOSITION  admitted      PATIENT REFERRED TO:  No follow-up provider specified.     DISCHARGE MEDICATIONS:  New Prescriptions    No medications on file       Dena Hobson DO  Emergency Medicine Resident    (Please note that portions of thisnote were completed with a voice recognition program.  Efforts were made to edit the dictations but occasionally words are mis-transcribed.)        Dena Hobson DO  Resident  03/26/21 [id number]

## 2021-03-26 NOTE — ED NOTES
Admission Dx: hyponatremia     Pts Chief Complaints on Arrival: abnormal labs    ADL's - self, ambulates. Stress incontinence. Pending Diagnostics:  AM labs    Residence PTA: private home    Special Considerations/Circumstances:  NSS at 110ml 20gLFA. Nephrology consult.      Vitals: Current vital signs:  /84   Pulse 81   Temp 97.8 °F (36.6 °C) (Oral)   Resp 14   Ht 5' 3\" (1.6 m)   Wt 147 lb 14.4 oz (67.1 kg)   SpO2 99%   BMI 26.20 kg/m²                MEWS Score: 1            Adrian Miles, RN  03/25/21 0413

## 2021-03-26 NOTE — PROGRESS NOTES
Patient arrived to [facility] around 0000, patient oriented to room and call light, admission and assessment complete. Patient oriented to room and call light, VS taken telemetry applied.

## 2021-03-26 NOTE — RESULT ENCOUNTER NOTE
Addressed in ED   Hyponatremia currently admitted, abnormal EKG, frequent PVCs  Future Appointments  4/12/2021  2:00 PM    [facility] [address]  5/27/2021  11:40 AM   Mayelin Torres MD     [facility]   [facility]  7/28/2021  3:45 PM    Bhavana Ruiz MD     [facility] [place]

## 2021-03-26 NOTE — PLAN OF CARE
Problem: Falls - Risk of:  Goal: Will remain free from falls  Description: Will remain free from falls  3/26/2021 1747 by Eduardo Arnold RN  Outcome: Met This Shift     Problem: Falls - Risk of:  Goal: Absence of physical injury  Description: Absence of physical injury  3/26/2021 1747 by Eduardo Arnold RN  Outcome: Met This Shift     Problem: SAFETY  Goal: Free from accidental physical injury  3/26/2021 1747 by Eduardo Arnold RN  Outcome: Met This Shift     Problem: SAFETY  Goal: Free from intentional harm  3/26/2021 1747 by Eduardo Arnold RN  Outcome: Met This Shift     Problem: DAILY CARE  Goal: Daily care needs are met  3/26/2021 1747 by Eduardo Arnold RN  Outcome: Met This Shift     Problem: PAIN  Goal: Patient's pain/discomfort is manageable  3/26/2021 1747 by Eduardo Arnold RN  Outcome: Met This Shift     Problem: SKIN INTEGRITY  Goal: Skin integrity is maintained or improved  3/26/2021 1747 by Eduardo Arnold RN  Outcome: Met This Shift     Problem: KNOWLEDGE DEFICIT  Goal: Patient/S.O. demonstrates understanding of disease process, treatment plan, medications, and discharge instructions.   3/26/2021 1747 by Eduardo Arnold RN  Outcome: Met This Shift     Problem: DISCHARGE BARRIERS  Goal: Patient's continuum of care needs are met  3/26/2021 1747 by Eduardo Arnold RN  Outcome: Met This Shift

## 2021-03-26 NOTE — DISCHARGE SUMMARY
[address]    Discharge Summary     Patient ID: Marjorie Marques  :  1935   MRN: [id number]     ACCOUNT:  [de-identified]   Patient's PCP: Marko Pulido MD  Admit Date: 3/25/2021   Discharge Date:   3/27/21    Length of Stay: 0  Code Status:  Full Code  Admitting Physician: Angel Ye MD  Discharge Physician: Jatin Hodge MD     Active Discharge Diagnoses:       Primary Problem  Hyponatremia      [place] Problems    Diagnosis Date Noted    Essential hypertension [I10] 2021    Chronic midline low back pain without sciatica [M54.5, G89.29] 03/15/2021    Hyponatremia [E87.1] 2020    Anemia [D64.9] 2020    CKD (chronic kidney disease) stage 3, GFR 30-59 ml/min ([facility] [address] [N18.30]     Gout [M10.9]        Admission Condition:  serious     Discharged Condition: fair    Hospital Stay:       Hospital Course:  Marjorie Marques is a 81 y.o. female who was admitted for the management of  Chronic Hyponatremia , presented to ER with Abnormal Lab (low sodium ) and Fatigue. Responded well to fluid restriction and NaCl 1g TID. Pt began felling better. Home medications were restarted. Today on day of discharge patient was feeling at her baseline, requesting to go home.      Significant therapeutic interventions: None    Significant Diagnostic Studies:   Labs / Micro:  CBC:   Lab Results   Component Value Date    WBC 4.9 2021    RBC 2.55 2021    RBC 3.62 2012    HGB 8.5 2021    HCT 25.4 2021    MCV 99.9 2021    MCH 33.6 2021    MCHC 33.6 2021    RDW 17.0 2021     2021     2012     BMP:    Lab Results   Component Value Date    GLUCOSE 80 2021    GLUCOSE 88 2012     2021    K 4.7 2021    CL 98 2021    CO2 19 2021    ANIONGAP 12 2021    BUN 26 2021    CREATININE 1.93 03/26/2021    BUNCRER NOT REPORTED 03/26/2021    CALCIUM 8.6 03/26/2021    LABGLOM 25 03/26/2021    GFRAA 30 03/26/2021    GFR      03/26/2021    GFR NOT REPORTED 03/26/2021        Radiology:    No results found. Consultations:    Consults:     Final Specialist Recommendations/Findings:   IP CONSULT TO NEPHROLOGY  IP CONSULT TO INTERNAL MEDICINE      The patient was seen and examined on day of discharge and this discharge summary is in conjunction with any daily progress note from day of discharge. Discharge plan:       Disposition: Home    Physician Follow Up:     Vince Sylvester MD  [address] [place] [address] [place] [id number]  [phone]          Farnaz R[address] [place] [facility] [place] [phone]             Requiring Further Evaluation/Follow Up POST HOSPITALIZATION/Incidental Findings:   Hyponatremia, advised continued follow up with Nephrology and PCP    Diet: renal diet with fluid restriction to less than 1500mL    Activity: As tolerated    Instructions to Patient:     Take all medication as prescribed to finish   Please make all follow up appointments  If symptoms worsen, fail to improve or return please seek medical attendtion     Discharge Medications:      Medication List      CHANGE how you take these medications    atorvastatin 20 MG tablet  Commonly known as: Lipitor  Take 1 tablet by mouth daily  What changed: when to take this     CENTRUM SILVER 50+WOMEN PO  What changed: Another medication with the same name was removed. Continue taking this medication, and follow the directions you see here.         CONTINUE taking these medications    allopurinol 100 MG tablet  Commonly known as: ZYLOPRIM  Take 1 tablet by mouth daily     aspirin 81 MG tablet  Take 1 tablet by mouth daily     cloNIDine 0.2 MG tablet  Commonly known as: CATAPRES  Take 1 tablet by mouth nightly     furosemide 20 MG tablet  Commonly known as: LASIX     Handicap Placard Misc  by Does not apply route Expires March 2026     hydrALAZINE 25 MG tablet  Commonly known as: APRESOLINE  TAKE 1 TABLET THREE TIMES A DAY     iron polysaccharides 150 MG capsule  Commonly known as: NIFEREX  Take 1 capsule by mouth daily     lidocaine 5 %  Commonly known as: LIDODERM  Place 1 patch onto the skin daily 12 hours on, 12 hours off. Magnesium Oxide 250 MG Tabs tablet  Commonly known as: MAGNESIUM-OXIDE  Take 1 tablet by mouth 2 times daily     omeprazole 20 MG delayed release capsule  Commonly known as: PRILOSEC  Take 1 capsule by mouth daily     sodium chloride 1 g tablet  Take 1 tablet by mouth 3 times daily        STOP taking these medications    neomycin-polymyxin-hydrocortisone 3.5-83164-1 otic suspension  Commonly known as: CORTISPORIN                Electronically signed by   Crissy Gallegos MD  3/26/2021  3:24 PM      Thank you Dr. Stevie Granger MD for the opportunity to be involved in this patient's care. Attending Physician Statement  I have reviewed and edited the discharge summary of  Anita Morfin AS NEEDED  ,   including pertinent history and exam findings. I have personally seen the patient and participated in discharge planning and evaluation . I have reviewed the key elements of all parts of the discharge summary . I agree with the information and plans as documented by the resident. Time Spent on discharge is  35  mins in patient examination, evaluation, counseling as well as medication reconciliation, prescriptions for required medications, discharge plan and follow up. Time spent on discharge planning ;          [] less than 30 minutes . [x]   more  than 30 minutes . Electronically signed by Patt Davenport MD on 3/26/2021 .

## 2021-03-26 NOTE — CONSULTS
NEPHROLOGY CONSULT       Reason for Consult: Hyponatremia       Chief Complaint:  Fatigue   History Obtained From: Patient     History of Present Illness: This is a 81 y.o. female with significant past medical history of systemic hypertension, osteoporosis, hyperlipidemia, Chronic hyponatremia and chronic kidney disease stage III [baseline serum creatinine 1.8 mg/dL and did require brief period of acute hemodialysis 2 years ago for TOYA on CKD. She was admitted to the hospital in August 2020 for hyponatremia and acute kidney injury [serum sodium 123 mmol/L and creatinine 3.99 mg/dL]. She was recently seen by DR Zee Porter and started on sodium chloride tablets 1 g tid which she started taking 2 days ago. She is also on Lasix 20 mg twice daily. Serum sodium was 125 on 3/17/21 and 120 on admission on 3/25/21. Patient was Hydrated with 0.9 saline  and started on sodium chloride tablets 1 g 3 times daily. Serum sodium improved to 129 this morning and  saline was discontinued. She expresses no nausea,  headache, gait instability but  has chronic leg pain secondary to gout.  Patient  does not have regular oral intake- complains of intermittent loss of appetite    Past Medical History:        Diagnosis Date    CKD (chronic kidney disease) stage 3, GFR 30-59 ml/min     Depression     GERD (gastroesophageal reflux disease)     Gout     Hyperlipidemia 3/22/2016    Hypertension     Hyponatremia     Metabolic acidosis     Osteoporosis     Secondary hyperparathyroidism ([facility] [address] 8/19/2020       Past Surgical History:        Procedure Laterality Date    APPENDECTOMY      BLADDER SUSPENSION      EYE SURGERY      bilat catract removal    HYSTERECTOMY      SHOULDER ARTHROPLASTY Bilateral     TOTAL HIP ARTHROPLASTY Bilateral     TOTAL KNEE ARTHROPLASTY Bilateral        Current Medications:    lidocaine (LIDODERM) 5 % 1 patch, Daily  allopurinol (ZYLOPRIM) tablet 100 mg, Daily  aspirin EC tablet 81 mg, Daily  atorvastatin (LIPITOR) tablet 20 mg, Nightly  cloNIDine (CATAPRES) tablet 0.2 mg, Nightly  hydrALAZINE (APRESOLINE) tablet 25 mg, 3 times per day  iron polysaccharides (NIFEREX) capsule 150 mg, Daily  magnesium oxide (MAG-OX) tablet 200 mg, BID  sodium chloride tablet 1 g, TID  pantoprazole (PROTONIX) tablet 40 mg, QAM AC  antioxidant multivitamin (OCUVITE) tablet, Daily  therapeutic multivitamin-minerals 1 tablet, Daily  furosemide (LASIX) tablet 20 mg, Daily  sodium chloride flush 0.9 % injection 10 mL, 2 times per day  sodium chloride flush 0.9 % injection 10 mL, PRN  potassium chloride (KLOR-CON M) extended release tablet 40 mEq, PRN    Or  potassium bicarb-citric acid (EFFER-K) effervescent tablet 40 mEq, PRN    Or  potassium chloride 10 mEq/100 mL IVPB (Peripheral Line), PRN  magnesium sulfate 1000 mg in dextrose 5% 100 mL IVPB, PRN  enoxaparin (LOVENOX) injection 30 mg, Daily  promethazine (PHENERGAN) tablet 12.5 mg, Q6H PRN    Or  ondansetron (ZOFRAN) injection 4 mg, Q6H PRN  polyethylene glycol (GLYCOLAX) packet 17 g, Daily PRN  acetaminophen (TYLENOL) tablet 650 mg, Q6H PRN    Or  acetaminophen (TYLENOL) suppository 650 mg, Q6H PRN        Allergies:  Norvasc [amlodipine besylate]    Social History:   Social History     Socioeconomic History    Marital status:      Spouse name: Not on file    Number of children: Not on file    Years of education: Not on file    Highest education level: Not on file   Occupational History    Not on file   Social Needs    Financial resource strain: Not on file    Food insecurity     Worry: Not on file     Inability: Not on file    Transportation needs     Medical: Not on file     Non-medical: Not on file   Tobacco Use    Smoking status: Never Smoker    Smokeless tobacco: Never Used   Substance and Sexual Activity    Alcohol use:  Yes     Alcohol/week: 2.0 standard drinks     Types: 2 Standard drinks or equivalent per week     Comment: occasionally    03/25/21  2020   LABALBU 3.7       IRON:    Lab Results   Component Value Date    IRON 177 08/14/2020     Iron Saturation:  No components found for: PERCENTFE  TIBC:    Lab Results   Component Value Date    TIBC 290 08/14/2020     FERRITIN:    Lab Results   Component Value Date    FERRITIN 136 08/14/2020     SPEP:   Lab Results   Component Value Date    PROT 6.6 03/25/2021    ALBCAL 4.6 08/28/2017    ALBPCT 69 08/28/2017    LABALPH 0.2 08/28/2017    LABALPH 0.7 08/28/2017    A1PCT 3 08/28/2017    A2PCT 10 08/28/2017    LABBETA 0.6 08/28/2017    BETAPCT 9 08/28/2017    GAMGLOB 0.6 08/28/2017    GGPCT 9 08/28/2017    PATH NOT REPORTED 08/28/2017     UPEP:   Lab Results   Component Value Date    TPU 5 08/28/2017        C3:   Lab Results   Component Value Date    C3 60 (L) 08/15/2020     C4:   Lab Results   Component Value Date    C4 17 08/15/2020     MPO ANCA:  No results found for: MPO .   PR3 ANCA:  No results found for: PR3  Urine Sodium:    Lab Results   Component Value Date    TIGRE 35 03/25/2021      Urine Potassium:  No results found for: KUR  Urine Chloride:  No components found for: CLU  Urine Ph:  No components found for: PO4U  Urine Osmolarity:    Lab Results   Component Value Date    OSMOU 138 08/22/2020     Urine Creatinine:    Lab Results   Component Value Date    LABCREA 101.1 03/25/2021     Urine Eosinophils: No components found for: EOSU  Urine Protein:    Lab Results   Component Value Date    TPU 5 08/28/2017     Urinalysis:  U/A:   Lab Results   Component Value Date    NITRU NEGATIVE 03/17/2021    COLORU YELLOW 03/17/2021    PHUR 5.0 03/17/2021    WBCUA 5 TO 10 03/17/2021    RBCUA 0 TO 2 03/17/2021    MUCUS NOT REPORTED 03/17/2021    TRICHOMONAS NOT REPORTED 03/17/2021    YEAST NOT REPORTED 03/17/2021    BACTERIA FEW 03/17/2021    SPECGRAV 1.010 03/17/2021    LEUKOCYTESUR TRACE 03/17/2021    UROBILINOGEN Normal 03/17/2021    BILIRUBINUR NEGATIVE 03/17/2021    BILIRUBINUR NEGATIVE 11/23/2011    GLUCOSEU NEGATIVE 03/17/2021    GLUCOSEU NEGATIVE 11/23/2011    [address] NEGATIVE 03/17/2021    AMORPHOUS NOT REPORTED 03/17/2021         Radiology:  Reviewed as available. Assessment: 1. 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 Patient does not have acute neurologic symptoms-component of hypovolemia with urine sodium of 20  2. Chronic kidney disease stage IV [serum creatinine baseline 1.8 to 2.0 mg/dl  2. Hypertension. 4.  Non-anion gap metabolic acidosis.       Plan:  1. Continue sodium, chloride tablets 1 g 3 times daily  2. D/C IV Fluids  3.decrease PO lasix to 20 mg dly  4. Encourage regular oral intake and protein intake  5. 1500 mls fluid restriction  6. Sodium q 6 hrly    OK to discharge from renal standpoint if serum sodium is > 128. F/u weekly O/P labs starting on 3/29/21 with  BMP    Thank you for the consultation.       Electronically signed by Rodney Evans MD on 3/26/2021 at 12:17 PM

## 2021-03-26 NOTE — PROGRESS NOTES
[facility] Internal Medicine    Progress Note     3/26/2021    11:48 AM    Name:   Bianca North  MRN:     [id number]     Acct:      [de-identified]   Room:   [facility] [place] [address] Day:  0  Admit Date:  3/25/2021  8:12 PM    PCP:   Lonny Mariano MD  Code Status:  Full Code    Subjective:     C/C:   Chief Complaint   Patient presents with    Abnormal Lab     low sodium     Fatigue     Principal Problem:    Hyponatremia  Active Problems:    Gout    CKD (chronic kidney disease) stage 3, GFR 30-59 ml/min (formerly [facility])    Anemia    Chronic midline low back pain without sciatica    Essential hypertension  Resolved Problems:    * No resolved hospital problems. *      Interval History Status: improved. Pt seen and examined bedside. In no acute distress  Resting comfortably inquiring if she can go home.    States she feels at her baseline  Denies fever, chills, CP, SOB N/V dizziness or lightheadedness  Vital signs are stable  Labs are stable or improving, Na from 120->129     Significant last 24 hr data reviewed ;   Vitals:    03/25/21 2230 03/26/21 0000 03/26/21 0530 03/26/21 0800   BP: 138/84 (!) 159/110 (!) 142/74 109/71   Pulse: 81 94 92 101   Resp: 14 12 14 12   Temp:  97.7 °F (36.5 °C) 97.5 °F (36.4 °C) 97.3 °F (36.3 °C)   TempSrc:  Oral Oral Axillary   SpO2: 99% 99% 98% 97%   Weight:   149 lb 14.6 oz (68 kg)    Height:          Recent Results (from the past 24 hour(s))   Basic Metabolic Panel    Collection Time: 03/25/21 12:55 PM   Result Value Ref Range    Glucose 94 70 - 99 mg/dL    BUN 28 (H) 8 - 23 mg/dL    CREATININE 1.86 (H) 0.50 - 0.90 mg/dL    Bun/Cre Ratio NOT REPORTED 9 - 20    Calcium 9.3 8.6 - 10.4 mg/dL    Sodium 121 (L) 135 - 144 mmol/L    Potassium 5.2 3.7 - 5.3 mmol/L    Chloride 89 (L) 98 - 107 mmol/L    CO2 21 20 - 31 mmol/L    Anion Gap 11 9 - 17 mmol/L    GFR Non-African American 26 (L) >60 mL/min    GFR  31 (L) >60 mL/min    GFR Comment GFR Staging NOT REPORTED    Electrolyte Panel    Collection Time: 03/25/21 12:56 PM   Result Value Ref Range    Sodium 120 (L) 135 - 144 mmol/L    Potassium 5.0 3.7 - 5.3 mmol/L    Chloride 89 (L) 98 - 107 mmol/L    CO2 22 20 - 31 mmol/L    Anion Gap 9 9 - 17 mmol/L   CBC with DIFF    Collection Time: 03/25/21  8:20 PM   Result Value Ref Range    WBC 5.2 3.5 - 11.0 k/uL    RBC 2.95 (L) 4.0 - 5.2 m/uL    Hemoglobin 9.7 (L) 12.0 - 16.0 g/dL    Hematocrit 29.5 (L) 36 - 46 %    .0 80 - 100 fL    MCH 32.9 26 - 34 pg    MCHC 32.9 31 - 37 g/dL    RDW 17.4 (H) 11.5 - 14.9 %    Platelets 236 675 - 594 k/uL    MPV 8.7 6.0 - 12.0 fL    NRBC Automated NOT REPORTED per 100 WBC    Differential Type NOT REPORTED     Immature Granulocytes NOT REPORTED 0 %    Absolute Immature Granulocyte NOT REPORTED 0.00 - 0.30 k/uL    WBC Morphology NOT REPORTED     RBC Morphology NOT REPORTED     Platelet Estimate NOT REPORTED     Seg Neutrophils 64 36 - 66 %    Lymphocytes 25 24 - 44 %    Monocytes 8 (H) 1 - 7 %    Eosinophils % 2 0 - 4 %    Basophils 1 0 - 2 %    Segs Absolute 3.30 1.3 - 9.1 k/uL    Absolute Lymph # 1.30 1.0 - 4.8 k/uL    Absolute Mono # 0.40 0.1 - 1.3 k/uL    Absolute Eos # 0.10 0.0 - 0.4 k/uL    Basophils Absolute 0.10 0.0 - 0.2 k/uL   Comprehensive Metabolic Panel    Collection Time: 03/25/21  8:20 PM   Result Value Ref Range    Glucose 122 (H) 70 - 99 mg/dL    BUN 29 (H) 8 - 23 mg/dL    CREATININE 2.16 (H) 0.50 - 0.90 mg/dL    Bun/Cre Ratio NOT REPORTED 9 - 20    Calcium 9.1 8.6 - 10.4 mg/dL    Sodium 123 (L) 135 - 144 mmol/L    Potassium 5.3 3.7 - 5.3 mmol/L    Chloride 91 (L) 98 - 107 mmol/L    CO2 18 (L) 20 - 31 mmol/L    Anion Gap 14 9 - 17 mmol/L    Alkaline Phosphatase 83 35 - 104 U/L    ALT 14 5 - 33 U/L    AST 44 (H) <32 U/L    Total Bilirubin 0.54 0.3 - 1.2 mg/dL    Total Protein 6.6 6.4 - 8.3 g/dL    Albumin 3.7 3.5 - 5.2 g/dL    Albumin/Globulin Ratio NOT REPORTED 1.0 - 2.5    GFR Non- 22 (L) >60 mL/min    GFR  26 (L) >60 mL/min    GFR Comment          GFR Staging NOT REPORTED    EKG 12 Lead    Collection Time: 03/25/21  8:44 PM   Result Value Ref Range    Ventricular Rate 163 BPM    Atrial Rate 163 BPM    P-R Interval 206 ms    QRS Duration 88 ms    Q-T Interval 186 ms    QTc Calculation (Bazett) 306 ms    P Axis 21 degrees    R Axis -39 degrees    T Axis 39 degrees   SODIUM, URINE, RANDOM    Collection Time: 03/25/21  8:52 PM   Result Value Ref Range    Sodium,Ur 35 mmol/L   CHLORIDE, URINE, RANDOM    Collection Time: 03/25/21  8:52 PM   Result Value Ref Range    Chloride, Ur <20 mmol/L   PROTEIN, URINE, RANDOM    Collection Time: 03/25/21  8:52 PM   Result Value Ref Range    Total Protein, Urine 22 mg/dL   CREATININE, RANDOM URINE    Collection Time: 03/25/21  8:52 PM   Result Value Ref Range    Creatinine, Ur 101.1 28.0 - 217.0 mg/dL   Basic Metabolic Panel w/ Reflex to MG    Collection Time: 03/26/21  5:21 AM   Result Value Ref Range    Glucose 80 70 - 99 mg/dL    BUN 26 (H) 8 - 23 mg/dL    CREATININE 1.93 (H) 0.50 - 0.90 mg/dL    Bun/Cre Ratio NOT REPORTED 9 - 20    Calcium 8.6 8.6 - 10.4 mg/dL    Sodium 129 (L) 135 - 144 mmol/L    Potassium 4.7 3.7 - 5.3 mmol/L    Chloride 98 98 - 107 mmol/L    CO2 19 (L) 20 - 31 mmol/L    Anion Gap 12 9 - 17 mmol/L    GFR Non-African American 25 (L) >60 mL/min    GFR African American 30 (L) >60 mL/min    GFR Comment          GFR Staging NOT REPORTED    CBC    Collection Time: 03/26/21  5:21 AM   Result Value Ref Range    WBC 4.9 3.5 - 11.0 k/uL    RBC 2.55 (L) 4.0 - 5.2 m/uL    Hemoglobin 8.5 (L) 12.0 - 16.0 g/dL    Hematocrit 25.4 (L) 36 - 46 %    MCV 99.9 80 - 100 fL    MCH 33.6 26 - 34 pg    MCHC 33.6 31 - 37 g/dL    RDW 17.0 (H) 11.5 - 14.9 %    Platelets 459 014 - 734 k/uL    MPV 8.1 6.0 - 12.0 fL    NRBC Automated NOT REPORTED per 100 WBC   Protime-INR    Collection Time: 03/26/21  5:21 AM   Result Value Ref Range    Protime 13.9 11.8 - 14.6 sec    INR 1.1      No results for input(s): POCGLU in the last 72 hours. No results found. HPI:     Per MR:  \"The patient is a 81 y.o.  female, with a history of GERD, hiatal hernia, CKD stage III, HTN, and hyponatremia, who presents with abnormal lab (low sodium) and fatigue. According to patient, she had lab work drawn at the request of her nephrologist earlier in the day, then received a call telling her to come to the ED for hyponatremia. Patient reports that she has had this problem in the past and was recently started on sodium chloride tablets 3 times daily. Symptoms are associated with fatigue, which patient relates to recent Covid vaccine. Denies fever, chills, chest pain, cough, abdominal pain, nausea, vomiting, diarrhea, and urinary symptoms. There are no aggravating or alleviating factors. Symptoms are reported as constant and mild. \"    Recommendations from Nephrology/ Dr. Lorin Baum outpatient visit 3/19/21:    \"7. Check electrolytes weekly x4.  2.  Fluid restriction 1500 mL/day. 3.  Furosemide 20 mg p.o. twice daily. 4.  Sodium chloride tablet 1 g p.o. 3 times daily. 5.  No indications for dialysis at this time. 6.  Increase hydralazine to 50 mg p.o. 3 times daily. \"    Review of Systems:     Constitutional:  negative for chills, fevers, sweats  Respiratory:  negative for cough, dyspnea on exertion, hemoptysis, shortness of breath, wheezing  Cardiovascular:  negative for chest pain, chest pressure/discomfort, lower extremity edema, palpitations  Gastrointestinal:  negative for abdominal pain, constipation, diarrhea, nausea, vomiting  Neurological:  negative for dizziness, headache      Physical Examination:        General appearance:  alert, cooperative and no distress  Mental Status:  oriented to person, place and time and normal affect  Lungs:  clear to auscultation bilaterally, normal effort  Heart:  regular rate and rhythm, no murmur  Abdomen:  soft, nontender, nondistended, normal bowel sounds, no masses, hepatomegaly, splenomegaly  Extremities:  no edema, redness, tenderness in the calves  Skin:  no gross lesions, rashes, induration    Assessment:        Primary Problem  Principal Problem:    Hyponatremia  Active Problems:    Gout    CKD (chronic kidney disease) stage 3, GFR 30-59 ml/min (HCC)    Anemia    Chronic midline low back pain without sciatica    Essential hypertension  Resolved Problems:    * No resolved hospital problems. *      Active Hospital Problems    Diagnosis Date Noted    Essential hypertension [I10] 03/26/2021    Chronic midline low back pain without sciatica [M54.5, G89.29] 03/15/2021    Hyponatremia [E87.1] 08/21/2020    Anemia [D64.9] 08/12/2020    CKD (chronic kidney disease) stage 3, GFR 30-59 ml/min (HCC) [N18.30]     Gout [M10.9]        Plan:        1. Hyponatremia-Chronic: Improving - Fluid restriction 1500 mL/day, Furosemide 20 mg p.o. twice daily, Sodium chloride tablet 1 g p.o. 3 times daily. Advised to make close follow up with nephrology as O/P  2. CKD: avoid nephro toxic medication dn continue to monitor   3. HTN: Continue clonidine 0.2 nightly, hydralazine 25 TID   4. Anemia: continue multivitamin and Niferex OD  5. Gout continue allopurinol and diet restrictions   6. DVT proph - lovenox  7. Chronic pain: tylenol and lidoderm patch    DISPO:  Is stable for discharge home and close follow up with nephrology and PCP    Plan to be discussed with attending Dr. Siddharth Ferrer       Medications: Allergies:     Allergies   Allergen Reactions    Norvasc [Amlodipine Besylate] Other (See Comments)     Edema       Current Meds:   Scheduled Meds:    lidocaine  1 patch Transdermal Daily    allopurinol  100 mg Oral Daily    aspirin  81 mg Oral Daily    atorvastatin  20 mg Oral Nightly    cloNIDine  0.2 mg Oral Nightly    hydrALAZINE  25 mg Oral 3 times per day    iron polysaccharides  150 mg Oral Daily    magnesium oxide  200 mg Oral BID    sodium chloride  1 g Oral TID    pantoprazole  40 mg Oral QAM AC    ocuvite-lutein  1 tablet Oral Daily    therapeutic multivitamin-minerals  1 tablet Oral Daily    furosemide  20 mg Oral Daily    sodium chloride flush  10 mL Intravenous 2 times per day    enoxaparin  30 mg Subcutaneous Daily     Continuous Infusions:   PRN Meds: sodium chloride flush, potassium chloride **OR** potassium alternative oral replacement **OR** potassium chloride, magnesium sulfate, promethazine **OR** ondansetron, polyethylene glycol, acetaminophen **OR** acetaminophen       Juani White MD  3/26/2021  11:48 AM     Attestation and add on       I have discussed the care of [facility] , including pertinent history and exam findings,      3/27/21    with the resident. I have seen and examined the patient and the key elements of all parts of the encounter have been performed by me . I agree with the assessment, plan and orders as documented by the resident. Principal Problem:    Hyponatremia  Active Problems:    Gout    CKD (chronic kidney disease) stage 3, GFR 30-59 ml/min ([facility])    Anemia    Chronic midline low back pain without sciatica    Essential hypertension  Resolved Problems:    * No resolved hospital problems. *         Plan home soon     ---- ;        Medications: Allergies:     Allergies   Allergen Reactions    Norvasc [Amlodipine Besylate] Other (See Comments)     Edema       Current Meds:   Scheduled Meds:    lidocaine  1 patch Transdermal Daily    allopurinol  100 mg Oral Daily    aspirin  81 mg Oral Daily    atorvastatin  20 mg Oral Nightly    cloNIDine  0.2 mg Oral Nightly    hydrALAZINE  25 mg Oral 3 times per day    iron polysaccharides  150 mg Oral Daily    magnesium oxide  200 mg Oral BID    sodium chloride  1 g Oral TID    pantoprazole  40 mg Oral QAM AC    ocuvite-lutein  1 tablet Oral Daily    therapeutic multivitamin-minerals  1 tablet Oral Daily    furosemide 20 mg Oral Daily    sodium chloride flush  10 mL Intravenous 2 times per day    enoxaparin  30 mg Subcutaneous Daily     Continuous Infusions:    sodium chloride 75 mL/hr at 03/27/21 0745     PRN Meds: sodium chloride flush, potassium chloride **OR** potassium alternative oral replacement **OR** potassium chloride, magnesium sulfate, promethazine **OR** ondansetron, polyethylene glycol, acetaminophen **OR** acetaminophen        [facility] [address]    Phone [phone]   Fax: [phone]  Answering Service: [phone]

## 2021-03-26 NOTE — ED PROVIDER NOTES
plan, and disposition of the patient as recorded by the resident. I performed a history and physical examination of the patient and discussed management with the resident. I reviewed the residents note and agree with the documented findings and plan of care. Any areas of disagreement are noted on the chart. I was personally present for the key portions of any procedures. I have documented in the chart those procedures where I was not present during the key portions. I have personally reviewed all images and agree with the resident's interpretation. I have reviewed the emergency nurses triage note. I agree with the chief complaint, past medical history, past surgical history, allergies, medications, social and family history as documented unless otherwise noted.     Rupinder Patel DO  Attending Emergency Physician            Rupinder Patel, DO  03/26/21 4822 [facility],   03/26/21 0355

## 2021-03-27 VITALS
HEIGHT: 63 IN | WEIGHT: 160.94 LBS | BODY MASS INDEX: 28.52 KG/M2 | SYSTOLIC BLOOD PRESSURE: 139 MMHG | OXYGEN SATURATION: 100 % | TEMPERATURE: 97.7 F | RESPIRATION RATE: 18 BRPM | HEART RATE: 98 BPM | DIASTOLIC BLOOD PRESSURE: 75 MMHG

## 2021-03-27 LAB
ANION GAP SERPL CALCULATED.3IONS-SCNC: 11 MMOL/L (ref 9–17)
ANION GAP SERPL CALCULATED.3IONS-SCNC: 6 MMOL/L (ref 9–17)
BUN BLDV-MCNC: 20 MG/DL (ref 8–23)
BUN BLDV-MCNC: 22 MG/DL (ref 8–23)
BUN/CREAT BLD: ABNORMAL (ref 9–20)
BUN/CREAT BLD: ABNORMAL (ref 9–20)
CALCIUM SERPL-MCNC: 8.3 MG/DL (ref 8.6–10.4)
CALCIUM SERPL-MCNC: 8.5 MG/DL (ref 8.6–10.4)
CHLORIDE BLD-SCNC: 101 MMOL/L (ref 98–107)
CHLORIDE BLD-SCNC: 104 MMOL/L (ref 98–107)
CO2: 19 MMOL/L (ref 20–31)
CO2: 20 MMOL/L (ref 20–31)
CREAT SERPL-MCNC: 1.67 MG/DL (ref 0.5–0.9)
CREAT SERPL-MCNC: 1.9 MG/DL (ref 0.5–0.9)
GFR AFRICAN AMERICAN: 31 ML/MIN
GFR AFRICAN AMERICAN: 35 ML/MIN
GFR NON-AFRICAN AMERICAN: 25 ML/MIN
GFR NON-AFRICAN AMERICAN: 29 ML/MIN
GFR SERPL CREATININE-BSD FRML MDRD: ABNORMAL ML/MIN/{1.73_M2}
GLUCOSE BLD-MCNC: 110 MG/DL (ref 70–99)
GLUCOSE BLD-MCNC: 84 MG/DL (ref 70–99)
HCT VFR BLD CALC: 25.2 % (ref 36–46)
HEMOGLOBIN: 8.4 G/DL (ref 12–16)
MCH RBC QN AUTO: 33.4 PG (ref 26–34)
MCHC RBC AUTO-ENTMCNC: 33.3 G/DL (ref 31–37)
MCV RBC AUTO: 100.5 FL (ref 80–100)
NRBC AUTOMATED: ABNORMAL PER 100 WBC
PDW BLD-RTO: 17.3 % (ref 11.5–14.9)
PLATELET # BLD: 230 K/UL (ref 150–450)
PMV BLD AUTO: 8.8 FL (ref 6–12)
POTASSIUM SERPL-SCNC: 4.4 MMOL/L (ref 3.7–5.3)
POTASSIUM SERPL-SCNC: 4.6 MMOL/L (ref 3.7–5.3)
RBC # BLD: 2.51 M/UL (ref 4–5.2)
SODIUM BLD-SCNC: 130 MMOL/L (ref 135–144)
SODIUM BLD-SCNC: 131 MMOL/L (ref 135–144)
WBC # BLD: 4.7 K/UL (ref 3.5–11)

## 2021-03-27 PROCEDURE — 6370000000 HC RX 637 (ALT 250 FOR IP): Performed by: NURSE PRACTITIONER

## 2021-03-27 PROCEDURE — 85027 COMPLETE CBC AUTOMATED: CPT

## 2021-03-27 PROCEDURE — 2580000003 HC RX 258: Performed by: INTERNAL MEDICINE

## 2021-03-27 PROCEDURE — 80048 BASIC METABOLIC PNL TOTAL CA: CPT

## 2021-03-27 PROCEDURE — 99232 SBSQ HOSP IP/OBS MODERATE 35: CPT | Performed by: INTERNAL MEDICINE

## 2021-03-27 PROCEDURE — G0378 HOSPITAL OBSERVATION PER HR: HCPCS

## 2021-03-27 PROCEDURE — 36415 COLL VENOUS BLD VENIPUNCTURE: CPT

## 2021-03-27 RX ADMIN — Medication 1 G: at 09:28

## 2021-03-27 RX ADMIN — SODIUM CHLORIDE: 9 INJECTION, SOLUTION INTRAVENOUS at 07:45

## 2021-03-27 ASSESSMENT — PAIN SCALES - GENERAL
PAINLEVEL_OUTOF10: 0
PAINLEVEL_OUTOF10: 0

## 2021-03-27 NOTE — PLAN OF CARE
Problem: Falls - Risk of:  Goal: Will remain free from falls  Description: Will remain free from falls  3/27/2021 0235 by Kathi Luna RN  Outcome: Ongoing  Note: Will continue to monitor patient needs hourly and respond in a timely fashion in order to decrease the risk of patient falls. Patient is a high fall risk. Bed alarm initiated at all times. Will encourage patient to utilize call light when needing to ambulate out of bed. Patient compliant this shift. Problem: Falls - Risk of:  Goal: Absence of physical injury  Description: Absence of physical injury  3/27/2021 0235 by Kathi Luna RN  Outcome: Ongoing  Note: Patient will continue to be free from physical injury. Patient's bed alarm is on and hourly rounding is being completed to ensure all patient needs are met. Problem: DAILY CARE  Goal: Daily care needs are met  3/27/2021 0235 by Kathi Luna RN  Outcome: Ongoing  Note: Will assist patient with any and all daily care needs to ensure they are met. Problem: PAIN  Goal: Patient's pain/discomfort is manageable  3/27/2021 0235 by Kathi Luna RN  Outcome: Ongoing  Note: Patient will be monitored and assessed to ensure pain is being managed properly in order to ensure the pain level has decreased or remained at a tolerable level for patient acceptance. No complaints of pain this shift. Problem: SKIN INTEGRITY  Goal: Skin integrity is maintained or improved  3/27/2021 0235 by Kathi Luna RN  Outcome: Ongoing  Note: Will continue to assess and monitor patient for any signs of skin integrity issues. Patient will be rotated hourly to ensure pressure points are being rotated since patient is unable to move freely.

## 2021-03-27 NOTE — FLOWSHEET NOTE
Pleasant [place] Visit with pt and son  Pt joyfully tearful as she reflected on conversation from yesterday- all the blessings in her life  Pt being d/c today and so thankful for [facility]       03/27/21 1328   Encounter Summary   Services provided to: Patient and family together   Referral/Consult From: [address] [place], [place] [address] [employer] [address] Visiting   (3-27-21)   Complexity of Encounter Moderate   Length of Encounter 15 minutes   Spiritual Assessment Completed Yes   Spiritual/[employer]   Type Spiritual support   Assessment Calm; Approachable;Tearful; Hopeful;Peaceful   Intervention Active listening;Explored feelings, thoughts, concerns;Nurtured hope;Sustaining presence/ Ministry of presence; Discussed relationship with God;Discussed meaning/purpose;Discussed belief system/Orthodox practices/conchis   Outcome Comfort;Expressed gratitude;Expressed feelings of grant, peace, and/or awe;Engaged in conversation; Shared life review;Expressed feelings/needs/concerns;Coping;Tearful; Hopeful;Encouraged;Receptive

## 2021-03-27 NOTE — CONSULTS
NEPHROLOGY CONSULT       Reason for Consult: Hyponatremia     Interval History  Patient feels better serum sodium  is stable. History of Present Illness: This is a 81 y.o. female with significant past medical history of systemic hypertension, osteoporosis, hyperlipidemia, Chronic hyponatremia and chronic kidney disease stage III [baseline serum creatinine 1.8 mg/dL and did require brief period of acute hemodialysis 2 years ago for TOYA on CKD. She was admitted to the hospital in August 2020 for hyponatremia and acute kidney injury [serum sodium 123 mmol/L and creatinine 3.99 mg/dL]. She was recently seen by DR Kamran Quinones and started on sodium chloride tablets 1 g tid which she started taking 2 days ago. She is also on Lasix 20 mg twice daily. Serum sodium was 125 on 3/17/21 and 120 on admission on 3/25/21. Patient was Hydrated with 0.9 saline  and started on sodium chloride tablets 1 g 3 times daily. Serum sodium improved to 129 this morning and  saline was discontinued. She expresses no nausea,  headache, gait instability but  has chronic leg pain secondary to gout.  Patient  does not have regular oral intake- complains of intermittent loss of appetite    Past Medical History:        Diagnosis Date    CKD (chronic kidney disease) stage 3, GFR 30-59 ml/min     Depression     GERD (gastroesophageal reflux disease)     Gout     Hyperlipidemia 3/22/2016    Hypertension     Hyponatremia     Metabolic acidosis     Osteoporosis     Secondary hyperparathyroidism ([address] 8/19/2020       Past Surgical History:        Procedure Laterality Date    APPENDECTOMY      BLADDER SUSPENSION      EYE SURGERY      bilat catract removal    HYSTERECTOMY      SHOULDER ARTHROPLASTY Bilateral     TOTAL HIP ARTHROPLASTY Bilateral     TOTAL KNEE ARTHROPLASTY Bilateral        Current Medications:    lidocaine (LIDODERM) 5 % 1 patch, Daily  0.9 % sodium chloride infusion, Continuous  allopurinol (ZYLOPRIM) tablet 100 mg, Daily  aspirin EC tablet 81 mg, Daily  atorvastatin (LIPITOR) tablet 20 mg, Nightly  cloNIDine (CATAPRES) tablet 0.2 mg, Nightly  hydrALAZINE (APRESOLINE) tablet 25 mg, 3 times per day  iron polysaccharides (NIFEREX) capsule 150 mg, Daily  magnesium oxide (MAG-OX) tablet 200 mg, BID  sodium chloride tablet 1 g, TID  pantoprazole (PROTONIX) tablet 40 mg, QAM AC  antioxidant multivitamin (OCUVITE) tablet, Daily  therapeutic multivitamin-minerals 1 tablet, Daily  furosemide (LASIX) tablet 20 mg, Daily  sodium chloride flush 0.9 % injection 10 mL, 2 times per day  sodium chloride flush 0.9 % injection 10 mL, PRN  potassium chloride (KLOR-CON M) extended release tablet 40 mEq, PRN    Or  potassium bicarb-citric acid (EFFER-K) effervescent tablet 40 mEq, PRN    Or  potassium chloride 10 mEq/100 mL IVPB (Peripheral Line), PRN  magnesium sulfate 1000 mg in dextrose 5% 100 mL IVPB, PRN  enoxaparin (LOVENOX) injection 30 mg, Daily  promethazine (PHENERGAN) tablet 12.5 mg, Q6H PRN    Or  ondansetron (ZOFRAN) injection 4 mg, Q6H PRN  polyethylene glycol (GLYCOLAX) packet 17 g, Daily PRN  acetaminophen (TYLENOL) tablet 650 mg, Q6H PRN    Or  acetaminophen (TYLENOL) suppository 650 mg, Q6H PRN        Allergies:  Norvasc [amlodipine besylate]    Social History:   Social History     Socioeconomic History    Marital status:      Spouse name: Not on file    Number of children: Not on file    Years of education: Not on file    Highest education level: Not on file   Occupational History    Not on file   Social Needs    Financial resource strain: Not on file    Food insecurity     Worry: Not on file     Inability: Not on file    Transportation needs     Medical: Not on file     Non-medical: Not on file   Tobacco Use    Smoking status: Never Smoker    Smokeless tobacco: Never Used   Substance and Sexual Activity    Alcohol use:  Yes     Alcohol/week: 2.0 standard drinks     Types: 2 Standard drinks or equivalent per week hours.  Magnesium: No results for input(s): MG in the last 72 hours. Albumin:   Recent Labs     03/25/21 2020   LABALBU 3.7       IRON:    Lab Results   Component Value Date    IRON 177 08/14/2020     Iron Saturation:  No components found for: PERCENTFE  TIBC:    Lab Results   Component Value Date    TIBC 290 08/14/2020     FERRITIN:    Lab Results   Component Value Date    FERRITIN 136 08/14/2020     SPEP:   Lab Results   Component Value Date    PROT 6.6 03/25/2021    ALBCAL 4.6 08/28/2017    ALBPCT 69 08/28/2017    LABALPH 0.2 08/28/2017    LABALPH 0.7 08/28/2017    A1PCT 3 08/28/2017    A2PCT 10 08/28/2017    LABBETA 0.6 08/28/2017    BETAPCT 9 08/28/2017    GAMGLOB 0.6 08/28/2017    GGPCT 9 08/28/2017    PATH NOT REPORTED 08/28/2017     UPEP:   Lab Results   Component Value Date    TPU 5 08/28/2017        C3:   Lab Results   Component Value Date    C3 60 (L) 08/15/2020     C4:   Lab Results   Component Value Date    C4 17 08/15/2020     MPO ANCA:  No results found for: MPO .   PR3 ANCA:  No results found for: PR3  Urine Sodium:    Lab Results   Component Value Date    TIGRE 35 03/25/2021      Urine Potassium:  No results found for: KUR  Urine Chloride:  No components found for: CLU  Urine Ph:  No components found for: PO4U  Urine Osmolarity:    Lab Results   Component Value Date    OSMOU 138 08/22/2020     Urine Creatinine:    Lab Results   Component Value Date    LABCREA 101.1 03/25/2021     Urine Eosinophils: No components found for: EOSU  Urine Protein:    Lab Results   Component Value Date    TPU 5 08/28/2017     Urinalysis:  U/A:   Lab Results   Component Value Date    NITRU NEGATIVE 03/17/2021    COLORU YELLOW 03/17/2021    PHUR 5.0 03/17/2021    WBCUA 5 TO 10 03/17/2021    RBCUA 0 TO 2 03/17/2021    MUCUS NOT REPORTED 03/17/2021    TRICHOMONAS NOT REPORTED 03/17/2021    YEAST NOT REPORTED 03/17/2021    BACTERIA FEW 03/17/2021    SPECGRAV 1.010 03/17/2021    LEUKOCYTESUR TRACE 03/17/2021    UROBILINOGEN Normal 03/17/2021    BILIRUBINUR NEGATIVE 03/17/2021    BILIRUBINUR NEGATIVE 11/23/2011    GLUCOSEU NEGATIVE 03/17/2021    GLUCOSEU NEGATIVE 11/23/2011    [address] NEGATIVE 03/17/2021    AMORPHOUS NOT REPORTED 03/17/2021         Radiology:  Reviewed as available. Assessment: 1. 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 Patient does not have acute neurologic symptoms-component of hypovolemia with urine sodium of 20-serum sodium stable at 131  2. Chronic kidney disease stage IV [serum creatinine baseline 1.8 to 2.0 mg/dl  2. Hypertension. 4.  Non-anion gap metabolic acidosis.       Plan:  1. Continue sodium, chloride tablets 1 g 3 times daily  2. PO lasix to 20 mg dly  4. Encourage regular oral intake and protein intake  5. 1500 mls fluid restriction      OK to discharge from renal standpoint   F/u Dr Chai Dewitt in 4 weeks  BMP in one week    Thank you for the consultation.       Electronically signed by Fozia Gonzales MD on 3/27/2021 at 1:02 PM

## 2021-03-27 NOTE — PROGRESS NOTES
Rounded with Dr. Ronald Walsh [facility] to discharge and follow up with PCP and nephrology outpatient.

## 2021-03-27 NOTE — PLAN OF CARE
Problem: Falls - Risk of:  Goal: Will remain free from falls  Description: Will remain free from falls  3/27/2021 1134 by Dandre Ortega RN  Outcome: Met This Shift  Note: The patient remained free from falls this shift, call light within reach, bed in locked and lowest position. Side rails up x2. Continue to monitor closely.        Problem: DAILY CARE  Goal: Daily care needs are met  3/27/2021 1134 by Dandre Ortega RN  Outcome: Met This Shift  Note: Pt assisted with ADLs as needed     Problem: PAIN  Goal: Patient's pain/discomfort is manageable  3/27/2021 1134 by Dandre Ortega RN  Outcome: Met This Shift  Note: Lidocaine patch sufficient for pain management per pt

## 2021-03-29 ENCOUNTER — TELEPHONE (OUTPATIENT)
Dept: FAMILY MEDICINE CLINIC | Age: 86
End: 2021-03-29

## 2021-03-30 ENCOUNTER — HOSPITAL ENCOUNTER (OUTPATIENT)
Age: 86
Discharge: HOME | End: 2021-03-30
Payer: MEDICARE

## 2021-03-30 DIAGNOSIS — E83.51 HYPOCALCEMIA: ICD-10-CM

## 2021-03-30 DIAGNOSIS — E87.1 HYPONATREMIA: ICD-10-CM

## 2021-03-30 DIAGNOSIS — N18.4 ANEMIA IN STAGE 4 CHRONIC KIDNEY DISEASE (HCC): ICD-10-CM

## 2021-03-30 DIAGNOSIS — N18.4 BENIGN HYPERTENSION WITH CKD (CHRONIC KIDNEY DISEASE) STAGE IV (HCC): ICD-10-CM

## 2021-03-30 DIAGNOSIS — N25.81 SECONDARY HYPERPARATHYROIDISM (HCC): ICD-10-CM

## 2021-03-30 DIAGNOSIS — E83.42 HYPOMAGNESEMIA: ICD-10-CM

## 2021-03-30 DIAGNOSIS — D63.1 ANEMIA IN STAGE 4 CHRONIC KIDNEY DISEASE (HCC): ICD-10-CM

## 2021-03-30 DIAGNOSIS — E79.0 ELEVATED URIC ACID IN BLOOD: ICD-10-CM

## 2021-03-30 DIAGNOSIS — I12.9 BENIGN HYPERTENSION WITH CKD (CHRONIC KIDNEY DISEASE) STAGE IV (HCC): ICD-10-CM

## 2021-03-30 DIAGNOSIS — N18.4 CKD (CHRONIC KIDNEY DISEASE), STAGE IV (HCC): ICD-10-CM

## 2021-03-30 LAB
ANION GAP SERPL CALCULATED.3IONS-SCNC: 11 MMOL/L (ref 9–17)
BUN BLDV-MCNC: 19 MG/DL (ref 8–23)
CHLORIDE BLD-SCNC: 105 MMOL/L (ref 98–107)
CO2: 19 MMOL/L (ref 20–31)
CREAT SERPL-MCNC: 1.85 MG/DL (ref 0.5–0.9)
GFR AFRICAN AMERICAN: 31 ML/MIN
GFR NON-AFRICAN AMERICAN: 26 ML/MIN
GFR SERPL CREATININE-BSD FRML MDRD: ABNORMAL ML/MIN/{1.73_M2}
GFR SERPL CREATININE-BSD FRML MDRD: ABNORMAL ML/MIN/{1.73_M2}
POTASSIUM SERPL-SCNC: 4.9 MMOL/L (ref 3.7–5.3)
SODIUM BLD-SCNC: 135 MMOL/L (ref 135–144)

## 2021-03-30 PROCEDURE — 36415 COLL VENOUS BLD VENIPUNCTURE: CPT

## 2021-03-30 PROCEDURE — 84520 ASSAY OF UREA NITROGEN: CPT

## 2021-03-30 PROCEDURE — 80051 ELECTROLYTE PANEL: CPT

## 2021-03-30 PROCEDURE — 82565 ASSAY OF CREATININE: CPT

## 2021-04-06 ENCOUNTER — HOSPITAL ENCOUNTER (OUTPATIENT)
Age: 86
Discharge: HOME | End: 2021-04-06
Payer: MEDICARE

## 2021-04-06 DIAGNOSIS — N18.4 BENIGN HYPERTENSION WITH CKD (CHRONIC KIDNEY DISEASE) STAGE IV (HCC): ICD-10-CM

## 2021-04-06 DIAGNOSIS — N25.81 SECONDARY HYPERPARATHYROIDISM (HCC): ICD-10-CM

## 2021-04-06 DIAGNOSIS — E79.0 ELEVATED URIC ACID IN BLOOD: ICD-10-CM

## 2021-04-06 DIAGNOSIS — E87.1 HYPONATREMIA: ICD-10-CM

## 2021-04-06 DIAGNOSIS — D63.1 ANEMIA IN STAGE 4 CHRONIC KIDNEY DISEASE (HCC): ICD-10-CM

## 2021-04-06 DIAGNOSIS — E83.42 HYPOMAGNESEMIA: ICD-10-CM

## 2021-04-06 DIAGNOSIS — N18.4 ANEMIA IN STAGE 4 CHRONIC KIDNEY DISEASE (HCC): ICD-10-CM

## 2021-04-06 DIAGNOSIS — I12.9 BENIGN HYPERTENSION WITH CKD (CHRONIC KIDNEY DISEASE) STAGE IV (HCC): ICD-10-CM

## 2021-04-06 DIAGNOSIS — N18.4 CKD (CHRONIC KIDNEY DISEASE), STAGE IV (HCC): ICD-10-CM

## 2021-04-06 DIAGNOSIS — E83.51 HYPOCALCEMIA: ICD-10-CM

## 2021-04-06 LAB
ANION GAP SERPL CALCULATED.3IONS-SCNC: 11 MMOL/L (ref 9–17)
BUN BLDV-MCNC: 23 MG/DL (ref 8–23)
CHLORIDE BLD-SCNC: 102 MMOL/L (ref 98–107)
CO2: 24 MMOL/L (ref 20–31)
CREAT SERPL-MCNC: 1.95 MG/DL (ref 0.5–0.9)
GFR AFRICAN AMERICAN: 30 ML/MIN
GFR NON-AFRICAN AMERICAN: 24 ML/MIN
GFR SERPL CREATININE-BSD FRML MDRD: ABNORMAL ML/MIN/{1.73_M2}
GFR SERPL CREATININE-BSD FRML MDRD: ABNORMAL ML/MIN/{1.73_M2}
POTASSIUM SERPL-SCNC: 4 MMOL/L (ref 3.7–5.3)
SODIUM BLD-SCNC: 137 MMOL/L (ref 135–144)

## 2021-04-06 PROCEDURE — 80051 ELECTROLYTE PANEL: CPT

## 2021-04-06 PROCEDURE — 82565 ASSAY OF CREATININE: CPT

## 2021-04-06 PROCEDURE — 84520 ASSAY OF UREA NITROGEN: CPT

## 2021-04-06 PROCEDURE — 36415 COLL VENOUS BLD VENIPUNCTURE: CPT

## 2021-04-08 RX ORDER — NICOTINE POLACRILEX 4 MG/1
1 GUM, CHEWING ORAL NIGHTLY
COMMUNITY
Start: 2021-03-22 | End: 2021-07-28 | Stop reason: SDUPTHER

## 2021-04-09 ENCOUNTER — OFFICE VISIT (OUTPATIENT)
Dept: FAMILY MEDICINE CLINIC | Age: 86
End: 2021-04-09
Payer: MEDICARE

## 2021-04-09 VITALS
SYSTOLIC BLOOD PRESSURE: 120 MMHG | BODY MASS INDEX: 26.75 KG/M2 | HEART RATE: 73 BPM | DIASTOLIC BLOOD PRESSURE: 80 MMHG | TEMPERATURE: 97.8 F | WEIGHT: 151 LBS | OXYGEN SATURATION: 99 % | HEIGHT: 63 IN

## 2021-04-09 DIAGNOSIS — M54.50 CHRONIC MIDLINE LOW BACK PAIN WITHOUT SCIATICA: ICD-10-CM

## 2021-04-09 DIAGNOSIS — E87.1 HYPONATREMIA: ICD-10-CM

## 2021-04-09 DIAGNOSIS — M1A.0721 IDIOPATHIC CHRONIC GOUT OF LEFT FOOT WITH TOPHUS: ICD-10-CM

## 2021-04-09 DIAGNOSIS — E87.6 HYPOKALEMIA: ICD-10-CM

## 2021-04-09 DIAGNOSIS — K21.9 GASTROESOPHAGEAL REFLUX DISEASE WITHOUT ESOPHAGITIS: ICD-10-CM

## 2021-04-09 DIAGNOSIS — N18.31 ANEMIA DUE TO STAGE 3A CHRONIC KIDNEY DISEASE (HCC): ICD-10-CM

## 2021-04-09 DIAGNOSIS — G89.29 CHRONIC MIDLINE LOW BACK PAIN WITHOUT SCIATICA: ICD-10-CM

## 2021-04-09 DIAGNOSIS — N18.31 STAGE 3A CHRONIC KIDNEY DISEASE (HCC): ICD-10-CM

## 2021-04-09 DIAGNOSIS — E83.42 HYPOMAGNESEMIA: ICD-10-CM

## 2021-04-09 DIAGNOSIS — D63.1 ANEMIA DUE TO STAGE 3A CHRONIC KIDNEY DISEASE (HCC): ICD-10-CM

## 2021-04-09 DIAGNOSIS — I10 ESSENTIAL HYPERTENSION: Primary | ICD-10-CM

## 2021-04-09 PROCEDURE — 1111F DSCHRG MED/CURRENT MED MERGE: CPT | Performed by: FAMILY MEDICINE

## 2021-04-09 PROCEDURE — 99495 TRANSJ CARE MGMT MOD F2F 14D: CPT | Performed by: FAMILY MEDICINE

## 2021-04-09 RX ORDER — MULTIVITAMIN/IRON/FOLIC ACID 18MG-0.4MG
250 TABLET ORAL 2 TIMES DAILY
Qty: 180 TABLET | Refills: 2 | Status: SHIPPED | OUTPATIENT
Start: 2021-04-09 | End: 2021-07-28 | Stop reason: SDUPTHER

## 2021-04-09 RX ORDER — SODIUM CHLORIDE 1000 MG
1 TABLET, SOLUBLE MISCELLANEOUS 3 TIMES DAILY
Qty: 270 TABLET | Refills: 3 | Status: SHIPPED | OUTPATIENT
Start: 2021-04-09 | End: 2021-07-28 | Stop reason: SDUPTHER

## 2021-04-09 RX ORDER — CLONIDINE HYDROCHLORIDE 0.2 MG/1
0.2 TABLET ORAL NIGHTLY
Qty: 180 TABLET | Refills: 1
Start: 2021-04-09 | End: 2021-06-03 | Stop reason: SDUPTHER

## 2021-04-09 ASSESSMENT — ENCOUNTER SYMPTOMS
BACK PAIN: 1
COUGH: 0
RESPIRATORY NEGATIVE: 1
SHORTNESS OF BREATH: 0
ABDOMINAL PAIN: 0

## 2021-04-09 ASSESSMENT — PATIENT HEALTH QUESTIONNAIRE - PHQ9
SUM OF ALL RESPONSES TO PHQ QUESTIONS 1-9: 0
SUM OF ALL RESPONSES TO PHQ9 QUESTIONS 1 & 2: 0
2. FEELING DOWN, DEPRESSED OR HOPELESS: 0

## 2021-04-09 NOTE — PATIENT INSTRUCTIONS
[facility] COVID-19 Vaccination Hotline: [phone]    COVID-19 vaccine appointments are not available through our practice. As you're eligible to receive the COVID-19 vaccine, as determined by your state's department of health, you will be able to schedule an appointment through [address] or by calling [phone]. Appointments are required to receive the COVID-19 vaccine. As vaccine supply continues to be limited, we anticipate open appointments to fill up quickly and appreciate your patience as we work through the process of providing vaccines to those in our communities. Schedule a Vaccine  When you qualify to receive the vaccine, call the [facility] COVID-19 Vaccination Hotline to schedule your appointment or to get additional information about the [facility] locations which are offering the COVID-19 vaccine. To be 94% effective, it's important that you receive two doses of one of the COVID-19 vaccines. -If you are receiving the Madden Peter vaccine, your second shot will be scheduled as close to 21 days after the first shot as possible. -If you are receiving the Moderna vaccine, your second shot will be scheduled as close to 28 days after the first shot as possible. Links to [facility]) website and [facility] website:    [employer]/mercy-[facility]-monitoring-coronavirus-covid-19/covid-19-vaccine/[place]-vaccine    https://[employer]/covidvaccine    [place]  https://sites. google. com/view/wchd[place]-coronavirus/home/vaccines/get-vaccinated  [employer].fi. com/r/[place]_Vaccine_On-Call_List      https://[employer]/shared/[email or web address]?:toolbar=n&:display_count=n&:origin=viz_share_link&:embed=y    PHARMACY LOCATIONS  Https://vaccine. coronavirus. [place].gov/    [facility]  Https://[employer]/shared/[email or web address]?:toolbar=n&:display_count=n&:origin=viz_share_link&:embed=y    [facility] Https://[email or web address]?:toolbar=n&:display_count=n&:origin=[employer]_share_link&:embed=y    [address]  Https://[email or web address]?:toolbar=n&:display_count=n&:origin=[employer]_link&:embed=y    [address]  https://[email or web address]?:toolbar=n&:display_count=n&:origin=[employer]_share_link&:embed=y

## 2021-04-09 NOTE — PROGRESS NOTES
MG tablet  Take 1 tablet by mouth daily             aspirin 81 MG tablet  Take 1 tablet by mouth daily             atorvastatin (LIPITOR) 20 MG tablet  Take 1 tablet by mouth daily             cloNIDine (CATAPRES) 0.2 MG tablet  Take 1 tablet by mouth nightly             furosemide (LASIX) 20 MG tablet  Take 1 tablet by mouth daily             Handicap Placard MISC  by Does not apply route Expires March 2026             hydrALAZINE (APRESOLINE) 25 MG tablet  TAKE 1 TABLET THREE TIMES A DAY             iron polysaccharides (NIFEREX) 150 MG capsule  Take 1 capsule by mouth daily             lidocaine (LIDODERM) 5 %  Place 1 patch onto the skin daily 12 hours on, 12 hours off.              Magnesium Oxide (MAGNESIUM-OXIDE) 250 MG TABS tablet  Take 1 tablet by mouth 2 times daily             Multiple Vitamins-Minerals (CENTRUM SILVER 50+WOMEN PO)  Take 1 tablet by mouth             omeprazole 20 MG EC tablet  Take 1 capsule by mouth nightly             sodium chloride 1 g tablet  Take 1 tablet by mouth 3 times daily                   Medications marked \"taking\" at this time  Outpatient Medications Marked as Taking for the 4/9/21 encounter (Office Visit) with ELIZABET Mills - CNP   Medication Sig Dispense Refill    omeprazole 20 MG EC tablet Take 1 capsule by mouth nightly      furosemide (LASIX) 20 MG tablet Take 1 tablet by mouth daily 90 tablet 3    Multiple Vitamins-Minerals (CENTRUM SILVER 50+WOMEN PO) Take 1 tablet by mouth      sodium chloride 1 g tablet Take 1 tablet by mouth 3 times daily 90 tablet 0    allopurinol (ZYLOPRIM) 100 MG tablet Take 1 tablet by mouth daily 90 tablet 3    cloNIDine (CATAPRES) 0.2 MG tablet Take 1 tablet by mouth nightly 90 tablet 3    atorvastatin (LIPITOR) 20 MG tablet Take 1 tablet by mouth daily (Patient taking differently: Take 20 mg by mouth nightly ) 90 tablet 3    hydrALAZINE (APRESOLINE) 25 MG tablet TAKE 1 TABLET THREE TIMES A  tablet 1    iron polysaccharides (NIFEREX) 150 MG capsule Take 1 capsule by mouth daily 60 capsule 3    lidocaine (LIDODERM) 5 % Place 1 patch onto the skin daily 12 hours on, 12 hours off. 30 patch 0    Handicap Placard [facility] by Does not apply route Expires March 2026 1 each 0    Magnesium Oxide (MAGNESIUM-OXIDE) 250 MG TABS tablet Take 1 tablet by mouth 2 times daily 60 tablet 1    aspirin 81 MG tablet Take 1 tablet by mouth daily 90 tablet 1        Medications patient taking as of now reconciled against medications ordered at time of hospital discharge: Yes    Chief Complaint   Patient presents with    [facility]Up from [facility]    Inpatient course: Discharge summary reviewed- see chart. Interval history/Current status: Jt Elam is a 81 y.o. female patient. Patient has a known history of hypertension, chronic kidney disease stage III, hyponatremia, hyperlipidemia, and significant drop in hemoglobin hematocrit. Patient was recently admitted for hyponatremia. Patient was evaluated and managed by . Patient had some sodium placement and was given some oral sodium to take. Patient's most recent sodium is within normal limits. Patient has standing order to get sodium checked next week. Depending on what her levels are we might have to continue rechecking her creatinine levels and sodium levels at the same time. Lab Results   Component Value Date/Time     04/06/2021 11:58 AM     03/30/2021 04:02 PM     (L) 03/27/2021 09:45 AM     (L) 03/27/2021 03:44 AM    CREATININE 1.95 (H) 04/06/2021 11:58 AM    CREATININE 1.85 (H) 03/30/2021 04:02 PM    CREATININE 1.90 (H) 03/27/2021 09:45 AM    CREATININE 1.67 (H) 03/27/2021 03:44 AM   Patient is limited to drink 24 ounces of water per day.     Patient's blood pressure was also fluctuating while in the hospital.  In fact, patient was off of most of her medication for her blood pressure due to some hypotension from her previous admission. Patient was started back on clonidine and hydralazine. Patient's clonidine was also incr did not demonstrate anything eased from once to twice a day and while was maintained to 25 mg 3 times a day. Patient was a former nurse and had been compliant in checking her blood pressure 3 times a day. Blood pressure in the office today is fairly stable. Patient also reports fair readings at home since she increased her clonidine to twice a day. Patient had always had low hemoglobin hematocrit. But it continues to drop from 11 in the beginning of March two 8.4. Patient's and I have discussed this in the past and we have talked about sending her to the hematologist which she declined in the beginning. However, due to the continues decreased I will go ahead and send this referral today despite her not wanting to be referred. Son who is with her in the office agrees that this needs to be addressed. Patient is currently on some ferrous sulfate. But due to her chronic kidney issues this might not improve with ongoing supplementation. Lab Results   Component Value Date/Time    HGB 8.4 (L) 03/27/2021 03:44 AM    HGB 8.5 (L) 03/26/2021 05:21 AM    HGB 9.7 (L) 03/25/2021 08:20 PM    HGB 11.1 (L) 03/17/2021 08:53 AM     GOUT  Patient also has a known gout. Patient's uric acid seem to be increasing. Patient is currently on allopurinol. Patient had stopped taking the allopurinol for a while which might have caused uric acid to go up even more. Patient was started back on it on her previous visit. Lab Results   Component Value Date/Time    URICACID 10.9 (H) 03/15/2021 04:10 PM    URICACID 6.0 (H) 08/15/2020 05:10 AM    URICACID 5.8 (H) 08/14/2020 09:45 AM       Review of Systems   Constitutional: Negative for activity change, chills, fatigue and fever. HENT: Negative. Respiratory: Negative. Negative for cough and shortness of breath.     Cardiovascular: Positive for leg swelling. Negative for chest pain and palpitations. Gastrointestinal: Negative for abdominal pain. Genitourinary: Negative for dysuria, flank pain, frequency, hematuria, pelvic pain and urgency. Musculoskeletal: Positive for arthralgias, back pain, gait problem, joint swelling and myalgias. Multiple joints including low back both hips and both knees   Skin: Negative for rash. Neurological: Negative for light-headedness and headaches. Hematological: Bruises/bleeds easily. Psychiatric/Behavioral: Positive for sleep disturbance. Negative for dysphoric mood and suicidal ideas. The patient is nervous/anxious. Vitals:    04/09/21 1324   BP: 120/80   Pulse: 73   Temp: 97.8 °F (36.6 °C)   SpO2: 99%   Weight: 151 lb (68.5 kg)   Height: 5' 3\" (1.6 m)     Body mass index is 26.75 kg/m². Wt Readings from Last 3 Encounters:   04/09/21 151 lb (68.5 kg)   03/27/21 160 lb 15 oz (73 kg)   03/19/21 146 lb 3.2 oz (66.3 kg)     BP Readings from Last 3 Encounters:   04/09/21 120/80   03/27/21 139/75   03/19/21 (!) 170/75       Physical Exam  Vitals signs and nursing note reviewed. Constitutional:       Appearance: She is well-developed and normal weight. HENT:      Head: Normocephalic. Right Ear: Tympanic membrane and external ear normal.      Left Ear: Tympanic membrane and external ear normal.      Nose: Nose normal.      Mouth/Throat:      Mouth: Mucous membranes are moist.   Eyes:      Pupils: Pupils are equal, round, and reactive to light. Neck:      Musculoskeletal: Normal range of motion and neck supple. Thyroid: No thyromegaly. Vascular: No JVD. Cardiovascular:      Rate and Rhythm: Normal rate and regular rhythm. Pulses: Normal pulses. Heart sounds: Normal heart sounds. No murmur. Pulmonary:      Effort: Pulmonary effort is normal. No respiratory distress. Breath sounds: Normal breath sounds.    Abdominal:      General: Bowel sounds are normal. There is no distension. Palpations: Abdomen is soft. Tenderness: There is no abdominal tenderness. Musculoskeletal:      Thoracic back: She exhibits decreased range of motion and tenderness. Lumbar back: She exhibits decreased range of motion, tenderness and pain. Right hand: She exhibits decreased range of motion and tenderness. Left hand: She exhibits decreased range of motion and tenderness. Right lower le+ Edema present. Left lower le+ Edema present. Lymphadenopathy:      Cervical: No cervical adenopathy. Skin:     General: Skin is warm and dry. Capillary Refill: Capillary refill takes less than 2 seconds. Neurological:      Mental Status: She is alert and oriented to person, place, and time. Psychiatric:         Mood and Affect: Mood is anxious. Speech: Speech is not rapid and pressured. Behavior: Behavior normal.         Thought Content: Thought content does not include homicidal or suicidal ideation.        Lab Results   Component Value Date    WBC 4.7 2021    HGB 8.4 (L) 2021    HCT 25.2 (L) 2021    .5 (H) 2021     2021     Lab Results   Component Value Date     2021    K 4.0 2021     2021    CO2 24 2021    BUN 23 2021    CREATININE 1.95 2021    GLUCOSE 110 2021    GLUCOSE 88 2012    CALCIUM 8.5 2021      Lab Results   Component Value Date    ALT 14 2021    AST 44 (H) 2021    ALKPHOS 83 2021    BILITOT 0.54 2021     Lab Results   Component Value Date    TSH 3.31 2020     Lab Results   Component Value Date    CHOL 179 10/14/2019    CHOL 138 2018    CHOL 147 2017     Lab Results   Component Value Date    TRIG 73 10/14/2019    TRIG 65 2018    TRIG 82 2017     Lab Results   Component Value Date    HDL 78 2021     10/14/2019    HDL 64 2018     Lab Results   Component Value Date    LDLCHOLESTEROL 74 03/17/2021    LDLCHOLESTEROL 10 10/14/2019    LDLCHOLESTEROL 61 06/18/2018     Lab Results   Component Value Date    VLDL NOT REPORTED 03/17/2021    VLDL NOT REPORTED 10/14/2019    VLDL NOT REPORTED 06/18/2018     Lab Results   Component Value Date    CHOLHDLRATIO 2.3 03/17/2021    CHOLHDLRATIO 1.2 10/14/2019    CHOLHDLRATIO 2.2 06/18/2018     Lab Results   Component Value Date    LABA1C 4.9 08/14/2020     Lab Results   Component Value Date    ALEERLDR74 449 08/14/2020     Lab Results   Component Value Date    FOLATE 15.3 08/14/2020     Lab Results   Component Value Date    IRON 177 (H) 08/14/2020    TIBC 290 08/14/2020    FERRITIN 136 08/14/2020     Lab Results   Component Value Date    VITD25 46.2 08/14/2020     US RETROPERITONEAL COMPLETE  Narrative: EXAMINATION:  RETROPERITONEAL ULTRASOUND OF THE KIDNEYS    8/15/2020    COMPARISON:  None    HISTORY:  ORDERING SYSTEM PROVIDED HISTORY: Acute kidney injury  TECHNOLOGIST PROVIDED HISTORY:  Acute kidney injury    FINDINGS:    Kidneys:    Suboptimal evaluation. Some renal tissue is obscured. The right kidney measures 8.1 cm in length and the left kidney measures 8.2  cm in length. Kidneys are echogenic suggestive of medical renal disease. No contour  deforming mass, shadowing stone or hydronephrosis. Small cyst involving the  right kidney measuring 1.5 cm. Impression: Echogenic kidneys suggestive of medical renal disease. No acute process  noted. Assessment/Plan:  1. Essential hypertension  Stable  Continue current therapy. DISCUSSED AND ADVISED TO:  Cut down on your salt intake. Cut down on caffeinated drinks, sports drinks. Instructed to check BP at home regularly. Report for any chest pains, shortness of breath, headaches, and lightheadedness. Call the office if your blood pressure continue to be higher than 140/90 or 90/50.    - cloNIDine (CATAPRES) 0.2 MG tablet;  Take 1 tablet by mouth nightly  Dispense: 180 tablet; Refill: 1  - RI DISCHARGE MEDS RECONCILED W/ CURRENT OUTPATIENT MED LIST    2. Stage 3a chronic kidney disease  Failure to Improve  Continue renal consult  Advised to increase fluid intake  Report decrease urine output  Decrease salt intake    - RI DISCHARGE MEDS RECONCILED W/ CURRENT OUTPATIENT MED LIST    3. Anemia due to stage 3a chronic kidney disease  Worsening  Referred to hematology  Continue the same therapy. DISCUSSED and ADVISED TO:  Make iron-rich foods a part daily diet. Eat foods with vitamin C along with iron-rich foods. Vitamin C helps absorb more iron from food. Eat meat and vegetables or grains together.       - RI DISCHARGE MEDS RECONCILED W/ CURRENT OUTPATIENT MED LIST  - Hanh Gonzalez MD, Hematology/Oncology, [place]    4. Chronic midline low back pain without sciatica  Failure to Improve  Continue current therapy. DISCUSSED AND ADVISED TO:  Use heat packs 15 to 20 mins every 2-3 hours. Do some back stretches as tolerated. Refer to hand out for instructions. Call for worsening, numbness, weakness.      - RI DISCHARGE MEDS RECONCILED W/ CURRENT OUTPATIENT MED LIST    5. 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 DISCUSSED AND ADVISED TO:  Limit or avoid Foods High In Purine- list given  Such as Beer and other alcoholic beverages. Also gravies and sauces made with meat. Anchovies, sardines, caviar, herring, mussels, tuna, Codfish, Trout, [address], [facility],   [employer], (such as liver or kidneys), tripe, sweet bread, wild game, goose,   High fructose corn syrup in foods and drinks      - RI DISCHARGE MEDS RECONCILED W/ CURRENT OUTPATIENT MED LIST    6. Hypokalemia  Improving  Continue to monitor    - RI DISCHARGE MEDS RECONCILED W/ CURRENT OUTPATIENT MED LIST    7.  Hyponatremia  Improving  Continue to monitor    - RI DISCHARGE MEDS RECONCILED W/ CURRENT OUTPATIENT MED LIST            Medical Decision Making: moderate complexity      This note was completed by using the assistance of a speech-recognition program. However, inadvertent computerized transcription errors may be present. Although every effort was made to ensure accuracy, no guarantees can be provided that every mistake has been identified and corrected by editing.   Electronically signed by ELIZBAET Sifuentes CNP on 4/9/21 at 1:34 PM EDT

## 2021-04-12 ENCOUNTER — IMMUNIZATION (OUTPATIENT)
Dept: PRIMARY CARE CLINIC | Age: 86
End: 2021-04-12
Payer: MEDICARE

## 2021-04-12 ENCOUNTER — HOSPITAL ENCOUNTER (OUTPATIENT)
Age: 86
Setting detail: SPECIMEN
Discharge: HOME | End: 2021-04-12
Payer: MEDICARE

## 2021-04-12 DIAGNOSIS — I12.9 BENIGN HYPERTENSION WITH CKD (CHRONIC KIDNEY DISEASE) STAGE IV (HCC): ICD-10-CM

## 2021-04-12 DIAGNOSIS — N25.81 SECONDARY HYPERPARATHYROIDISM (HCC): ICD-10-CM

## 2021-04-12 DIAGNOSIS — E79.0 ELEVATED URIC ACID IN BLOOD: ICD-10-CM

## 2021-04-12 DIAGNOSIS — N18.4 BENIGN HYPERTENSION WITH CKD (CHRONIC KIDNEY DISEASE) STAGE IV (HCC): ICD-10-CM

## 2021-04-12 DIAGNOSIS — E83.42 HYPOMAGNESEMIA: ICD-10-CM

## 2021-04-12 DIAGNOSIS — E83.51 HYPOCALCEMIA: ICD-10-CM

## 2021-04-12 DIAGNOSIS — N18.4 ANEMIA IN STAGE 4 CHRONIC KIDNEY DISEASE (HCC): ICD-10-CM

## 2021-04-12 DIAGNOSIS — N18.4 CKD (CHRONIC KIDNEY DISEASE), STAGE IV (HCC): ICD-10-CM

## 2021-04-12 DIAGNOSIS — E87.1 HYPONATREMIA: ICD-10-CM

## 2021-04-12 DIAGNOSIS — D63.1 ANEMIA IN STAGE 4 CHRONIC KIDNEY DISEASE (HCC): ICD-10-CM

## 2021-04-12 LAB
ANION GAP SERPL CALCULATED.3IONS-SCNC: 10 MMOL/L (ref 9–17)
BUN BLDV-MCNC: 23 MG/DL (ref 8–23)
CHLORIDE BLD-SCNC: 98 MMOL/L (ref 98–107)
CO2: 24 MMOL/L (ref 20–31)
CREAT SERPL-MCNC: 1.52 MG/DL (ref 0.5–0.9)
GFR AFRICAN AMERICAN: 39 ML/MIN
GFR NON-AFRICAN AMERICAN: 33 ML/MIN
GFR SERPL CREATININE-BSD FRML MDRD: ABNORMAL ML/MIN/{1.73_M2}
GFR SERPL CREATININE-BSD FRML MDRD: ABNORMAL ML/MIN/{1.73_M2}
POTASSIUM SERPL-SCNC: 4.1 MMOL/L (ref 3.7–5.3)
SODIUM BLD-SCNC: 132 MMOL/L (ref 135–144)

## 2021-04-12 PROCEDURE — 0002A COVID-19, PFIZER VACCINE 30MCG/0.3ML DOSE: CPT | Performed by: INTERNAL MEDICINE

## 2021-04-12 PROCEDURE — 91300 COVID-19, PFIZER VACCINE 30MCG/0.3ML DOSE: CPT | Performed by: INTERNAL MEDICINE

## 2021-04-14 NOTE — RESULT ENCOUNTER NOTE
Management per nephrologist, low sodium 132 , Chronic kidney disease stage III improved, ON SODIUM PILLS  Future Appointments  5/20/2021  3:00 PM    Sanjay Suarez MD         [facility]      [facility]  5/27/2021  11:40 AM   Katie Hatfield MD     [facility]   [facility]  7/28/2021  3:45 PM    Allyn Paredes MD     [facility]

## 2021-05-04 DIAGNOSIS — D58.2 SIGNIFICANT DROP IN HEMOGLOBIN (HCC): ICD-10-CM

## 2021-05-04 DIAGNOSIS — N18.31 ANEMIA DUE TO STAGE 3A CHRONIC KIDNEY DISEASE (HCC): ICD-10-CM

## 2021-05-04 DIAGNOSIS — D63.1 ANEMIA DUE TO STAGE 3A CHRONIC KIDNEY DISEASE (HCC): ICD-10-CM

## 2021-05-05 RX ORDER — IRON POLYSACCHARIDE COMPLEX 150 MG
150 CAPSULE ORAL DAILY
Qty: 180 CAPSULE | Refills: 3 | Status: SHIPPED | OUTPATIENT
Start: 2021-05-05 | End: 2022-05-12 | Stop reason: SDUPTHER

## 2021-05-05 RX ORDER — IRON POLYSACCHARIDE COMPLEX 150 MG
150 CAPSULE ORAL DAILY
Qty: 60 CAPSULE | Refills: 3 | Status: SHIPPED | OUTPATIENT
Start: 2021-05-05 | End: 2021-05-05

## 2021-05-20 ENCOUNTER — TELEPHONE (OUTPATIENT)
Dept: ONCOLOGY | Age: 86
End: 2021-05-20

## 2021-05-20 NOTE — TELEPHONE ENCOUNTER
Pt scheduled for 5/20/21 for Dr. Ashish Whalen consult at [place] but did not show. Luanne Carmeilta tried calling and it only rang, no answer or machine. I tried calling twice both times it rang once then dead air before disconnecting.

## 2021-05-25 ENCOUNTER — HOSPITAL ENCOUNTER (OUTPATIENT)
Age: 86
Discharge: HOME | End: 2021-05-25
Payer: MEDICARE

## 2021-05-25 DIAGNOSIS — E79.0 ELEVATED URIC ACID IN BLOOD: ICD-10-CM

## 2021-05-25 DIAGNOSIS — D63.1 ANEMIA IN STAGE 4 CHRONIC KIDNEY DISEASE (HCC): ICD-10-CM

## 2021-05-25 DIAGNOSIS — E83.42 HYPOMAGNESEMIA: ICD-10-CM

## 2021-05-25 DIAGNOSIS — N18.4 CKD (CHRONIC KIDNEY DISEASE), STAGE IV (HCC): ICD-10-CM

## 2021-05-25 DIAGNOSIS — I12.9 BENIGN HYPERTENSION WITH CKD (CHRONIC KIDNEY DISEASE) STAGE IV (HCC): ICD-10-CM

## 2021-05-25 DIAGNOSIS — E87.1 HYPONATREMIA: ICD-10-CM

## 2021-05-25 DIAGNOSIS — E83.51 HYPOCALCEMIA: ICD-10-CM

## 2021-05-25 DIAGNOSIS — N18.4 ANEMIA IN STAGE 4 CHRONIC KIDNEY DISEASE (HCC): ICD-10-CM

## 2021-05-25 DIAGNOSIS — N25.81 SECONDARY HYPERPARATHYROIDISM (HCC): ICD-10-CM

## 2021-05-25 DIAGNOSIS — N18.4 BENIGN HYPERTENSION WITH CKD (CHRONIC KIDNEY DISEASE) STAGE IV (HCC): ICD-10-CM

## 2021-05-25 LAB
-: ABNORMAL
ABSOLUTE EOS #: 0.2 K/UL (ref 0–0.4)
ABSOLUTE IMMATURE GRANULOCYTE: ABNORMAL K/UL (ref 0–0.3)
ABSOLUTE LYMPH #: 2.1 K/UL (ref 1–4.8)
ABSOLUTE MONO #: 0.4 K/UL (ref 0.1–1.3)
AMORPHOUS: ABNORMAL
ANION GAP SERPL CALCULATED.3IONS-SCNC: 16 MMOL/L (ref 9–17)
BACTERIA: ABNORMAL
BASOPHILS # BLD: 1 % (ref 0–2)
BASOPHILS ABSOLUTE: 0.1 K/UL (ref 0–0.2)
BILIRUBIN URINE: NEGATIVE
BUN BLDV-MCNC: 28 MG/DL (ref 8–23)
CALCIUM SERPL-MCNC: 9.4 MG/DL (ref 8.6–10.4)
CASTS UA: ABNORMAL /LPF
CHLORIDE BLD-SCNC: 104 MMOL/L (ref 98–107)
CO2: 24 MMOL/L (ref 20–31)
COLOR: YELLOW
COMMENT UA: ABNORMAL
CREAT SERPL-MCNC: 1.69 MG/DL (ref 0.5–0.9)
CREATININE URINE: 87 MG/DL (ref 28–217)
CRYSTALS, UA: ABNORMAL /HPF
DIFFERENTIAL TYPE: ABNORMAL
EOSINOPHILS RELATIVE PERCENT: 3 % (ref 0–4)
EPITHELIAL CELLS UA: ABNORMAL /HPF
GFR AFRICAN AMERICAN: 35 ML/MIN
GFR NON-AFRICAN AMERICAN: 29 ML/MIN
GFR SERPL CREATININE-BSD FRML MDRD: ABNORMAL ML/MIN/{1.73_M2}
GFR SERPL CREATININE-BSD FRML MDRD: ABNORMAL ML/MIN/{1.73_M2}
GLUCOSE URINE: NEGATIVE
HCT VFR BLD CALC: 33.5 % (ref 36–46)
HEMOGLOBIN: 11.1 G/DL (ref 12–16)
IMMATURE GRANULOCYTES: ABNORMAL %
KETONES, URINE: NEGATIVE
LEUKOCYTE ESTERASE, URINE: ABNORMAL
LYMPHOCYTES # BLD: 39 % (ref 24–44)
MAGNESIUM: 1.7 MG/DL (ref 1.6–2.6)
MCH RBC QN AUTO: 32.6 PG (ref 26–34)
MCHC RBC AUTO-ENTMCNC: 33 G/DL (ref 31–37)
MCV RBC AUTO: 98.7 FL (ref 80–100)
MICROALBUMIN/CREAT 24H UR: 116 MG/L
MICROALBUMIN/CREAT UR-RTO: 133 MCG/MG CREAT
MONOCYTES # BLD: 8 % (ref 1–7)
MUCUS: ABNORMAL
NITRITE, URINE: NEGATIVE
NRBC AUTOMATED: ABNORMAL PER 100 WBC
OTHER OBSERVATIONS UA: ABNORMAL
PDW BLD-RTO: 18.2 % (ref 11.5–14.9)
PH UA: 6 (ref 5–8)
PHOSPHORUS: 3.6 MG/DL (ref 2.6–4.5)
PLATELET # BLD: 385 K/UL (ref 150–450)
PLATELET ESTIMATE: ABNORMAL
PMV BLD AUTO: 9.4 FL (ref 6–12)
POTASSIUM SERPL-SCNC: 3.7 MMOL/L (ref 3.7–5.3)
PROTEIN UA: ABNORMAL
RBC # BLD: 3.4 M/UL (ref 4–5.2)
RBC # BLD: ABNORMAL 10*6/UL
RBC UA: ABNORMAL /HPF
RENAL EPITHELIAL, UA: ABNORMAL /HPF
SEG NEUTROPHILS: 49 % (ref 36–66)
SEGMENTED NEUTROPHILS ABSOLUTE COUNT: 2.6 K/UL (ref 1.3–9.1)
SODIUM BLD-SCNC: 144 MMOL/L (ref 135–144)
SPECIFIC GRAVITY UA: 1.01 (ref 1–1.03)
TRICHOMONAS: ABNORMAL
TURBIDITY: CLEAR
URINE HGB: NEGATIVE
UROBILINOGEN, URINE: NORMAL
VITAMIN D 25-HYDROXY: 41.4 NG/ML (ref 30–100)
WBC # BLD: 5.5 K/UL (ref 3.5–11)
WBC # BLD: ABNORMAL 10*3/UL
WBC UA: ABNORMAL /HPF
YEAST: ABNORMAL

## 2021-05-25 PROCEDURE — 85025 COMPLETE CBC W/AUTO DIFF WBC: CPT

## 2021-05-25 PROCEDURE — 81001 URINALYSIS AUTO W/SCOPE: CPT

## 2021-05-25 PROCEDURE — 82565 ASSAY OF CREATININE: CPT

## 2021-05-25 PROCEDURE — 83735 ASSAY OF MAGNESIUM: CPT

## 2021-05-25 PROCEDURE — 36415 COLL VENOUS BLD VENIPUNCTURE: CPT

## 2021-05-25 PROCEDURE — 82306 VITAMIN D 25 HYDROXY: CPT

## 2021-05-25 PROCEDURE — 84520 ASSAY OF UREA NITROGEN: CPT

## 2021-05-25 PROCEDURE — 80051 ELECTROLYTE PANEL: CPT

## 2021-05-25 PROCEDURE — 82310 ASSAY OF CALCIUM: CPT

## 2021-05-25 PROCEDURE — 82570 ASSAY OF URINE CREATININE: CPT

## 2021-05-25 PROCEDURE — 84100 ASSAY OF PHOSPHORUS: CPT

## 2021-05-25 PROCEDURE — 82043 UR ALBUMIN QUANTITATIVE: CPT

## 2021-06-01 NOTE — RESULT ENCOUNTER NOTE
Please let her know   anemia is improved  Chronic kidney disease stage III/IV relatively stable, absolutely avoid ibuprofen, naproxen, Aleve and Motrin  Calcium is normal now  Sodium is normal now, 144  Magnesium is improved 1.7  Otherwise labs within normal limits    Continue current treatment  Future Appointments  7/28/2021  3:45 PM    Poli Shaffer MD     [facility]               [facility]  10/5/2021  12:40 PM   Cata Allen MD     [facility]        [facility]

## 2021-07-27 NOTE — PROGRESS NOTES
Visit Information    Have you changed or started any medications since your last visit including any over-the-counter medicines, vitamins, or herbal medicines? no   Have you stopped taking any of your medications? Is so, why? -  no  Are you having any side effects from any of your medications? - no    Have you seen any other physician or provider since your last visit?  no   Have you had any other diagnostic tests since your last visit?  no   Have you been seen in the emergency room and/or had an admission in a hospital since we last saw you?  no   Have you had your routine dental cleaning in the past 6 months?  no     Do you have an active MyChart account? If no, what is the barrier?   Yes    Patient Care Team:  Carito Alvarez MD as PCP - General (Family Medicine)  Carito Alvarez MD as PCP - [facility]  Lissette Diaz MD as Consulting Physician (Nephrology)  Anjelica Stanley MD as Consulting Physician (Nephrology)  Dago Dunn MD as Consulting Physician (Hematology and Oncology)    Medical History Review  Past Medical, Family, and Social History reviewed and does contribute to the patient presenting condition    Health Maintenance   Topic Date Due    DTaP/Tdap/Td vaccine (1 - Tdap) Never done    Shingles Vaccine (1 of 2) Never done   [employer] Visit (AWV)  Never done    Flu vaccine (1) 09/01/2021    Lipid screen  03/17/2022    Potassium monitoring  05/25/2022    Creatinine monitoring  05/25/2022    DEXA (modify frequency per FRAX score)  Completed    Pneumococcal 65+ yrs at Risk Vaccine  Completed    COVID-19 Vaccine  Completed    Hepatitis A vaccine  Aged Out    Hepatitis B vaccine  Aged Out    Hib vaccine  Aged Out    Meningococcal (ACWY) vaccine  Aged Out

## 2021-07-28 ENCOUNTER — OFFICE VISIT (OUTPATIENT)
Dept: FAMILY MEDICINE CLINIC | Age: 86
End: 2021-07-28
Payer: MEDICARE

## 2021-07-28 VITALS
DIASTOLIC BLOOD PRESSURE: 78 MMHG | TEMPERATURE: 97.3 F | HEIGHT: 63 IN | SYSTOLIC BLOOD PRESSURE: 132 MMHG | HEART RATE: 76 BPM | OXYGEN SATURATION: 97 % | BODY MASS INDEX: 25.76 KG/M2 | WEIGHT: 145.4 LBS

## 2021-07-28 DIAGNOSIS — D64.9 ANEMIA, UNSPECIFIED TYPE: ICD-10-CM

## 2021-07-28 DIAGNOSIS — N18.30 BENIGN HYPERTENSION WITH CKD (CHRONIC KIDNEY DISEASE) STAGE III (HCC): Primary | ICD-10-CM

## 2021-07-28 DIAGNOSIS — K21.9 GASTROESOPHAGEAL REFLUX DISEASE WITHOUT ESOPHAGITIS: ICD-10-CM

## 2021-07-28 DIAGNOSIS — E78.5 HYPERLIPIDEMIA WITH TARGET LDL LESS THAN 100: ICD-10-CM

## 2021-07-28 DIAGNOSIS — E87.1 HYPONATREMIA: ICD-10-CM

## 2021-07-28 DIAGNOSIS — M1A.0721 IDIOPATHIC CHRONIC GOUT OF LEFT FOOT WITH TOPHUS: ICD-10-CM

## 2021-07-28 DIAGNOSIS — R22.43 LOCALIZED SWELLING OF BOTH LOWER LEGS: ICD-10-CM

## 2021-07-28 DIAGNOSIS — E83.42 HYPOMAGNESEMIA: ICD-10-CM

## 2021-07-28 DIAGNOSIS — R01.1 MURMUR, CARDIAC: ICD-10-CM

## 2021-07-28 DIAGNOSIS — I12.9 BENIGN HYPERTENSION WITH CKD (CHRONIC KIDNEY DISEASE) STAGE III (HCC): Primary | ICD-10-CM

## 2021-07-28 PROCEDURE — G8399 PT W/DXA RESULTS DOCUMENT: HCPCS | Performed by: FAMILY MEDICINE

## 2021-07-28 PROCEDURE — 99214 OFFICE O/P EST MOD 30 MIN: CPT | Performed by: FAMILY MEDICINE

## 2021-07-28 PROCEDURE — 1036F TOBACCO NON-USER: CPT | Performed by: FAMILY MEDICINE

## 2021-07-28 PROCEDURE — G8427 DOCREV CUR MEDS BY ELIG CLIN: HCPCS | Performed by: FAMILY MEDICINE

## 2021-07-28 PROCEDURE — 3288F FALL RISK ASSESSMENT DOCD: CPT | Performed by: FAMILY MEDICINE

## 2021-07-28 PROCEDURE — 1123F ACP DISCUSS/DSCN MKR DOCD: CPT | Performed by: FAMILY MEDICINE

## 2021-07-28 PROCEDURE — 1090F PRES/ABSN URINE INCON ASSESS: CPT | Performed by: FAMILY MEDICINE

## 2021-07-28 PROCEDURE — 4040F PNEUMOC VAC/ADMIN/RCVD: CPT | Performed by: FAMILY MEDICINE

## 2021-07-28 PROCEDURE — G8417 CALC BMI ABV UP PARAM F/U: HCPCS | Performed by: FAMILY MEDICINE

## 2021-07-28 RX ORDER — CLONIDINE HYDROCHLORIDE 0.2 MG/1
0.2 TABLET ORAL 2 TIMES DAILY
Qty: 180 TABLET | Refills: 3 | Status: SHIPPED | OUTPATIENT
Start: 2021-07-28

## 2021-07-28 RX ORDER — SODIUM CHLORIDE 1000 MG
1 TABLET, SOLUBLE MISCELLANEOUS 3 TIMES DAILY
Qty: 270 TABLET | Refills: 3 | Status: SHIPPED | OUTPATIENT
Start: 2021-07-28

## 2021-07-28 RX ORDER — ALLOPURINOL 100 MG/1
100 TABLET ORAL DAILY
Qty: 90 TABLET | Refills: 3 | Status: SHIPPED | OUTPATIENT
Start: 2021-07-28 | End: 2021-10-05 | Stop reason: SDUPTHER

## 2021-07-28 RX ORDER — FUROSEMIDE 20 MG/1
20 TABLET ORAL DAILY
Qty: 90 TABLET | Refills: 3 | Status: SHIPPED | OUTPATIENT
Start: 2021-07-28

## 2021-07-28 RX ORDER — NICOTINE POLACRILEX 4 MG/1
20 GUM, CHEWING ORAL
Qty: 90 TABLET | Refills: 3 | Status: SHIPPED | OUTPATIENT
Start: 2021-07-28

## 2021-07-28 RX ORDER — HYDRALAZINE HYDROCHLORIDE 25 MG/1
TABLET, FILM COATED ORAL
Qty: 270 TABLET | Refills: 3 | Status: SHIPPED | OUTPATIENT
Start: 2021-07-28

## 2021-07-28 RX ORDER — MULTIVITAMIN/IRON/FOLIC ACID 18MG-0.4MG
250 TABLET ORAL 2 TIMES DAILY
Qty: 180 TABLET | Refills: 2 | Status: SHIPPED | OUTPATIENT
Start: 2021-07-28

## 2021-07-28 RX ORDER — ATORVASTATIN CALCIUM 20 MG/1
20 TABLET, FILM COATED ORAL NIGHTLY
Qty: 90 TABLET | Refills: 3 | Status: SHIPPED | OUTPATIENT
Start: 2021-07-28

## 2021-07-28 SDOH — ECONOMIC STABILITY: FOOD INSECURITY: WITHIN THE PAST 12 MONTHS, THE FOOD YOU BOUGHT JUST DIDN'T LAST AND YOU DIDN'T HAVE MONEY TO GET MORE.: NEVER TRUE

## 2021-07-28 SDOH — ECONOMIC STABILITY: FOOD INSECURITY: WITHIN THE PAST 12 MONTHS, YOU WORRIED THAT YOUR FOOD WOULD RUN OUT BEFORE YOU GOT MONEY TO BUY MORE.: NEVER TRUE

## 2021-07-28 ASSESSMENT — PATIENT HEALTH QUESTIONNAIRE - PHQ9
SUM OF ALL RESPONSES TO PHQ9 QUESTIONS 1 & 2: 0
SUM OF ALL RESPONSES TO PHQ QUESTIONS 1-9: 0
2. FEELING DOWN, DEPRESSED OR HOPELESS: 0
1. LITTLE INTEREST OR PLEASURE IN DOING THINGS: 0
SUM OF ALL RESPONSES TO PHQ QUESTIONS 1-9: 0
SUM OF ALL RESPONSES TO PHQ QUESTIONS 1-9: 0

## 2021-07-28 ASSESSMENT — ENCOUNTER SYMPTOMS
DIARRHEA: 0
ABDOMINAL DISTENTION: 0
COUGH: 0
BACK PAIN: 1
NAUSEA: 0
ABDOMINAL PAIN: 0
CHEST TIGHTNESS: 0
SHORTNESS OF BREATH: 0
VOMITING: 0
WHEEZING: 0
CONSTIPATION: 0

## 2021-07-28 ASSESSMENT — SOCIAL DETERMINANTS OF HEALTH (SDOH): HOW HARD IS IT FOR YOU TO PAY FOR THE VERY BASICS LIKE FOOD, HOUSING, MEDICAL CARE, AND HEATING?: NOT HARD AT ALL

## 2021-07-28 NOTE — PROGRESS NOTES
Rocío Morfin (:  1935) is a 81 y.o. female,Established patient, here for evaluation of the following chief complaint(s): Hypertension, Chronic Kidney Disease, Fatigue (NOTICED THE PAST FEW WEEKS VERY TIRED/), Edema (B/L SWELLING IN FEET), Back Pain (HANDS ALSO/ PAIN SCORE:  4/10 WHEN ACTIVE), and Immunizations (YES TO COVID-19 VACCINE )      ASSESSMENT/PLAN:    1. Benign hypertension with CKD (chronic kidney disease) stage III (HCC)  Worsening chronic kidney disease stage III, GFR 29 on 2021, was 33 on 2021  Well controlled hypertension. Continue current treatment. Will recheck labs. Follows with nephrology    -     CBC Auto Differential; Future  -     Comprehensive Metabolic Panel; Future  -     Magnesium; Future  -     TSH without Reflex; Future  -     cloNIDine (CATAPRES) 0.2 MG tablet; Take 1 tablet by mouth 2 times daily, Disp-180 tablet, R-3Normal  -     hydrALAZINE (APRESOLINE) 25 MG tablet; TAKE 1 TABLET THREE TIMES A DAY, Disp-270 tablet, R-3Normal  Continue furosemide 20 mg daily  2. Localized swelling of both lower legs  Worsening  Likely related to heat, keeping her legs down, alcohol, chronic kidney disease stage III worsening and heading toward stage IV, not wearing her compression stockings, need of taking sodium pills due to chronic hyponatremia  All of these were discussed in detail with the son and patient, she is agreeable to at least keep her legs elevated and try to wear the compression stockings  Advised that she could take 1 additional furosemide when the leg swelling is worse    3. Gastroesophageal reflux disease without esophagitis  Improved with medications  Continue current treatment  -     omeprazole 20 MG EC tablet; Take 1 tablet by mouth every morning (before breakfast), Disp-90 tablet, R-3Normal  4.  Hyponatremia  Improved per prior labs, 144 on 2021  We'll recheck her sodium level, continue sodium tablet, advised to cut down drinking, and water restriction 6 x 8 ounce glasses of water every day, follow-up with nephrologist as scheduled  -     sodium chloride 1 g tablet; Take 1 tablet by mouth 3 times daily, Disp-270 tablet, R-3Normal  5. Anemia, unspecified type  Improved   hemoglobin 11.1 on 5/25/2021, was 8.4 on 3/27/2021  Likely anemia of chronic disease   to continue iron supplementation    -     CBC Auto Differential; Future  6. Hypomagnesemia  Prior magnesium 1.7 on 5/25/2021  Recheck magnesium level  -     Magnesium Oxide (MAGNESIUM-OXIDE) 250 MG TABS tablet; Take 1 tablet by mouth 2 times daily, Disp-180 tablet, R-2Normal  7. Hyperlipidemia with target LDL less than 100   Likely improving  Continue current treatment  Lab Results   Component Value Date    LDLCHOLESTEROL 74 03/17/2021       -     atorvastatin (LIPITOR) 20 MG tablet; Take 1 tablet by mouth nightly, Disp-90 tablet, R-3Normal  8. 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 Murmur, cardiac  -     ECHO Complete 2D W Doppler W Color; Future  Son and patient agreeable for investigation  Could also look in more detail at the reasoning of her worsening leg swelling and to rule out cardiac condition    Anita received counseling on the following healthy behaviors: nutrition, exercise, medication adherence and decrease in alcohol consumption  Reviewed prior labs and health maintenance  Discussed use, benefit, and side effects of prescribed medications. Barriers to medication compliance addressed. Patient given educational materials - see patient instructions  All patient questions answered. Patient voiced understanding. The patient's past medical,surgical, social, and family history as well as her current medications and allergies were reviewed as documented in today's encounter. Medications, labs, diagnostic studies, consultations and follow-up as documented in this encounter. Return in about 3 months (around 10/28/2021) for MEDICARE, VISION, PHQ9, MINICOG, HRA QUESTIONS. Patient is eligible and due for Medicare AWV, schedule as separate visit. Future Appointments   Date Time Provider Eric Lei   8/9/2021  2:30 PM [facility] ECHO RM 1 [facility]   10/5/2021 12:45 PM Laurie Judge MD [facility]   10/5/2021  2:30 PM [facility]oughlin[facility]       SUBJECTIVE/OBJECTIVE:    Anita complains of fatigue and worsening leg swelling    Comes with her son, Elise Prather      Hypertension, Chronic kidney disease stage 3     she  is not exercising and is not adherent to low salt diet. Patient is on sodium pills for chronic hyponatremia     blood pressure is well controlled at home. Cardiac symptoms fatigue and lower extremity edemaboth worsening     Patient denies chest pain, chest pressure/discomfort, claudication, dyspnea, exertional chest pressure/discomfort, irregular heart beat, near-syncope, orthopnea, palpitations, paroxysmal nocturnal dyspnea, syncope and tachypnea. Cardiovascular risk factors: advanced age (older than 54 for men, 72 for women), dyslipidemia, hypertension and sedentary lifestyle. Use of agents associated with hypertension: none. History of target organ damage: chronic kidney disease. Patient reports worsening leg swelling for 1 day  Has been keeping her legs down  She says she hasn't been able to place her compression stockings due to her hands hurting and osteoarthritis in her hands. Patient reports taking all of her medications    BP controlled. Teodoro Tipton reports compliance with BP medications, and tolerates them well, denies side effects.     blood pressure is Normal.    BP Readings from Last 3 Encounters:   07/28/21 132/78   04/09/21 120/80   03/27/21 139/75        Pulse is Normal.    Pulse Readings from Last 3 Encounters:   07/28/21 76   04/09/21 73   03/27/21 98       Weight is decreasing, has been losing weight, has lost 6 pounds in 3 months since the last appointment with us    Wt Readings from Last 3 Encounters:   07/28/21 145 lb 6.4 oz (66 kg)   04/09/21 151 lb (68.5 kg)   03/27/21 160 lb 15 oz (73 kg)     GERD  Patient reports omeprazole helps with heartburn, denies nausea, vomiting, abdominal pain. If she stops it she gets heartburn so she wants to continue taking it    Patient admits to drinking vodka almost every night  I discussed with her to cut down, but her son says \" it is very light\", and not to worry about      Hyperlipidemia:  No new myalgias or GI upset on atorvastatin (Lipitor). Medication compliance: compliant all of the time. Patient is  following a low fat, low cholesterol diet. LDL is normal  Lab Results   Component Value Date    LDLCHOLESTEROL 74 03/17/2021     Lab Results   Component Value Date    TRIG 73 10/14/2019    TRIG 65 06/18/2018    TRIG 82 08/26/2017     Patient has gout, she reports worsening pain in her hands  Taking allopurinol, tolerates it well, denies side effects    Lab Results   Component Value Date    URICACID 10.9 (H) 03/15/2021          [x]Negative depression screening. PHQ Scores 7/28/2021 4/9/2021 3/15/2021 8/11/2020 10/10/2019 10/29/2018 5/26/2017   PHQ2 Score 0 0 0 0 0 0 0   PHQ9 Score 0 0 0 0 0 0 0         Prior to Visit Medications    Medication Sig Taking?  Authorizing Provider   Multiple Vitamins-Minerals (OCUVITE PO) Take by mouth Yes Historical Provider, MD   cloNIDine (CATAPRES) 0.2 MG tablet Take 1 tablet by mouth 2 times daily Yes Bere Vasquez MD   iron polysaccharides (NIFEREX) 150 MG capsule Take 1 capsule by mouth daily Yes Bere Vasquez MD   Magnesium Oxide (MAGNESIUM-OXIDE) 250 MG TABS tablet Take 1 tablet by mouth 2 times daily Yes Bere Vasquez MD   sodium chloride 1 g tablet Take 1 tablet by mouth 3 times daily Yes Mikayla MD Tucker   omeprazole 20 MG EC tablet Take 1 capsule by mouth nightly Yes Historical Provider, MD   furosemide (LASIX) 20 MG tablet Take 1 tablet by mouth daily Yes Gurmeet Bell MD   Multiple Vitamins-Minerals (CENTRUM SILVER 50+WOMEN PO) Take 1 tablet by mouth Yes Historical Provider, MD   allopurinol (ZYLOPRIM) 100 MG tablet Take 1 tablet by mouth daily Yes Edvin Loomis MD   atorvastatin (LIPITOR) 20 MG tablet Take 1 tablet by mouth daily  Patient taking differently: Take 20 mg by mouth nightly  Yes Edvin Loomis MD   hydrALAZINE (APRESOLINE) 25 MG tablet TAKE 1 TABLET THREE TIMES A DAY Yes ELIZABET Mills CNP   Handicap Placard MISC by Does not apply route Expires March 2026 Yes ELIZABET Mills CNP   aspirin 81 MG tablet Take 1 tablet by mouth daily Yes Sandra Giles MD       Social History     Tobacco Use    Smoking status: Never Smoker    Smokeless tobacco: Never Used   Substance Use Topics    Alcohol use: Yes     Alcohol/week: 2.0 standard drinks     Types: 2 Standard drinks or equivalent per week     Comment: occasionally    Drug use: No       Review of Systems   Constitutional: Positive for fatigue and unexpected weight change. Negative for activity change, appetite change, chills, diaphoresis and fever. Respiratory: Negative for cough, chest tightness, shortness of breath and wheezing. Cardiovascular: Positive for leg swelling. Negative for chest pain and palpitations. Gastrointestinal: Negative for abdominal distention, abdominal pain, constipation, diarrhea, nausea and vomiting. Musculoskeletal: Positive for arthralgias (hands), back pain and gait problem. Ambulates with walker   Psychiatric/Behavioral: Positive for sleep disturbance. Negative for dysphoric mood. The patient is nervous/anxious.          Drinking every night vodka to fall asleep         -vital signs stable and within normal limits     /78   Pulse 76   Temp 97.3 °F (36.3 °C)   Ht 5' 3\" (1.6 m)   Wt 145 lb 6.4 oz (66 kg)   SpO2 97%   BMI 25.76 kg/m²        Physical Exam  Vitals and nursing note reviewed. Constitutional:       General: She is not in acute distress. Appearance: Normal appearance. She is well-developed. She is not diaphoretic. HENT:      Head: Normocephalic and atraumatic. Right Ear: External ear normal.      Left Ear: External ear normal.      Mouth/Throat:      Comments: I did not examine the mouth due to coronavirus pandemic and wearing masks    Eyes:      General: Lids are normal. No scleral icterus. Right eye: No discharge. Left eye: No discharge. Extraocular Movements: Extraocular movements intact. Conjunctiva/sclera: Conjunctivae normal.   Neck:      Thyroid: No thyromegaly. Cardiovascular:      Rate and Rhythm: Normal rate and regular rhythm. Heart sounds: Normal heart sounds. No murmur heard. Pulmonary:      Effort: Pulmonary effort is normal. No respiratory distress. Breath sounds: Normal breath sounds. No wheezing or rales. Chest:      Chest wall: No tenderness. Abdominal:      General: Bowel sounds are normal. There is no distension. Palpations: Abdomen is soft. There is no hepatomegaly or splenomegaly. Tenderness: There is no abdominal tenderness. Musculoskeletal:         General: No tenderness. Normal range of motion. Cervical back: Normal range of motion and neck supple. Right lower le+ Pitting Edema present. Left lower le+ Pitting Edema present. Comments: Ambulates with tripod walker. 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 Thoracic kyphosis   Skin:     General: Skin is warm and dry. Capillary Refill: Capillary refill takes less than 2 seconds. Findings: No rash. Neurological:      Mental Status: She is alert and oriented to person, place, and time. Cranial Nerves: No cranial nerve deficit.       Motor: No abnormal muscle tone. Psychiatric:         Mood and Affect: Mood is anxious. Behavior: Behavior normal.         Thought Content: Thought content normal.         Judgment: Judgment normal.             I personally reviewed testing with patient.   Anemia improving  Chronic kidney disease stage 3 worsening  Hyponatremia improved  Hyperglycemia  Otherwise labs within normal limits    Lab Results   Component Value Date    WBC 5.5 05/25/2021    HGB 11.1 (L) 05/25/2021    HCT 33.5 (L) 05/25/2021    MCV 98.7 05/25/2021     05/25/2021       Lab Results   Component Value Date     05/25/2021    K 3.7 05/25/2021     05/25/2021    CO2 24 05/25/2021    BUN 28 05/25/2021    CREATININE 1.69 05/25/2021    GLUCOSE 110 03/27/2021    GLUCOSE 88 05/29/2012    CALCIUM 9.4 05/25/2021        Lab Results   Component Value Date    ALT 14 03/25/2021    AST 44 (H) 03/25/2021    ALKPHOS 83 03/25/2021    BILITOT 0.54 03/25/2021       Lab Results   Component Value Date    TSH 3.31 08/23/2020       Lab Results   Component Value Date    CHOL 179 10/14/2019    CHOL 138 06/18/2018    CHOL 147 08/26/2017     Lab Results   Component Value Date    TRIG 73 10/14/2019    TRIG 65 06/18/2018    TRIG 82 08/26/2017     Lab Results   Component Value Date    HDL 78 03/17/2021     10/14/2019    HDL 64 06/18/2018     Lab Results   Component Value Date    LDLCHOLESTEROL 74 03/17/2021    LDLCHOLESTEROL 10 10/14/2019    LDLCHOLESTEROL 61 06/18/2018     Lab Results   Component Value Date    CHOLHDLRATIO 2.3 03/17/2021    CHOLHDLRATIO 1.2 10/14/2019    CHOLHDLRATIO 2.2 06/18/2018       Lab Results   Component Value Date    LABA1C 4.9 08/14/2020       Lab Results   Component Value Date    QRXDEVXL43 449 08/14/2020       Lab Results   Component Value Date    FOLATE 15.3 08/14/2020       Lab Results   Component Value Date    VITD25 41.4 05/25/2021         Orders Placed This Encounter   Procedures    CBC Auto Differential     Standing Status:   Future     Number of Occurrences:   1     Standing Expiration Date:   9/10/2021    Comprehensive Metabolic Panel     Standing Status:   Future     Number of Occurrences:   1     Standing Expiration Date:   9/10/2021    Magnesium     Standing Status:   Future     Number of Occurrences:   1     Standing Expiration Date:   9/10/2021    TSH without Reflex     Standing Status:   Future     Number of Occurrences:   1     Standing Expiration Date:   9/10/2021    ECHO Complete 2D W Doppler W Color     Standing Status:   Future     Standing Expiration Date:   7/28/2022     Order Specific Question:   Reason for exam:     Answer:   murmur       Orders Placed This Encounter   Medications    cloNIDine (CATAPRES) 0.2 MG tablet     Sig: Take 1 tablet by mouth 2 times daily     Dispense:  180 tablet     Refill:  3    Magnesium Oxide (MAGNESIUM-OXIDE) 250 MG TABS tablet     Sig: Take 1 tablet by mouth 2 times daily     Dispense:  180 tablet     Refill:  2    sodium chloride 1 g tablet     Sig: Take 1 tablet by mouth 3 times daily     Dispense:  270 tablet     Refill:  3    omeprazole 20 MG EC tablet     Sig: Take 1 tablet by mouth every morning (before breakfast)     Dispense:  90 tablet     Refill:  3    furosemide (LASIX) 20 MG tablet     Sig: Take 1 tablet by mouth daily     Dispense:  90 tablet     Refill:  3    allopurinol (ZYLOPRIM) 100 MG tablet     Sig: Take 1 tablet by mouth daily     Dispense:  90 tablet     Refill:  3    atorvastatin (LIPITOR) 20 MG tablet     Sig: Take 1 tablet by mouth nightly     Dispense:  90 tablet     Refill:  3    hydrALAZINE (APRESOLINE) 25 MG tablet     Sig: TAKE 1 TABLET THREE TIMES A DAY     Dispense:  270 tablet     Refill:  3       Medications Discontinued During This Encounter   Medication Reason    hydrALAZINE (APRESOLINE) 25 MG tablet REORDER    allopurinol (ZYLOPRIM) 100 MG tablet REORDER    atorvastatin (LIPITOR) 20 MG tablet REORDER    furosemide (LASIX) 20 MG tablet REORDER    omeprazole 20 MG EC tablet REORDER    Magnesium Oxide (MAGNESIUM-OXIDE) 250 MG TABS tablet REORDER    sodium chloride 1 g tablet REORDER    cloNIDine (CATAPRES) 0.2 MG tablet REORDER           On this date 7/28/2021 I have spent 39 minutes reviewing previous notes, test results and face to face with the patient discussing the diagnosis and importance of compliance with the treatment plan as well as documenting on the day of the visit and care coordination with her son Elise Prather. This note was completed by using the assistance of a speech-recognition program. However, inadvertent computerized transcription errors may be present. Although every effort was made to ensure accuracy, no guarantees can be provided that every mistake has been identified and corrected by editing. An electronic signature was used to authenticate this note.   Electronically signed by Stewart Poon MD on 8/2/2021 at 2:01 PM

## 2021-07-30 ENCOUNTER — HOSPITAL ENCOUNTER (OUTPATIENT)
Age: 86
Discharge: HOME | End: 2021-07-30
Payer: MEDICARE

## 2021-07-30 DIAGNOSIS — E87.1 HYPONATREMIA: ICD-10-CM

## 2021-07-30 DIAGNOSIS — I12.9 BENIGN HYPERTENSION WITH CKD (CHRONIC KIDNEY DISEASE) STAGE III (HCC): ICD-10-CM

## 2021-07-30 DIAGNOSIS — N18.4 CKD (CHRONIC KIDNEY DISEASE) STAGE 4, GFR 15-29 ML/MIN (HCC): Primary | ICD-10-CM

## 2021-07-30 DIAGNOSIS — N18.30 BENIGN HYPERTENSION WITH CKD (CHRONIC KIDNEY DISEASE) STAGE III (HCC): ICD-10-CM

## 2021-07-30 DIAGNOSIS — D63.8 ANEMIA, CHRONIC DISEASE: ICD-10-CM

## 2021-07-30 DIAGNOSIS — D64.9 ANEMIA, UNSPECIFIED TYPE: ICD-10-CM

## 2021-07-30 PROBLEM — N17.9 ACUTE KIDNEY INJURY SUPERIMPOSED ON CHRONIC KIDNEY DISEASE (HCC): Status: RESOLVED | Noted: 2020-08-14 | Resolved: 2021-07-30

## 2021-07-30 PROBLEM — H92.01 ACUTE EAR PAIN, RIGHT: Status: RESOLVED | Noted: 2020-08-12 | Resolved: 2021-07-30

## 2021-07-30 PROBLEM — N18.9 ACUTE KIDNEY INJURY SUPERIMPOSED ON CHRONIC KIDNEY DISEASE (HCC): Status: RESOLVED | Noted: 2020-08-14 | Resolved: 2021-07-30

## 2021-07-30 PROBLEM — I10 ESSENTIAL HYPERTENSION: Status: RESOLVED | Noted: 2021-03-26 | Resolved: 2021-07-30

## 2021-07-30 PROBLEM — D58.2 SIGNIFICANT DROP IN HEMOGLOBIN (HCC): Status: RESOLVED | Noted: 2020-08-15 | Resolved: 2021-07-30

## 2021-07-30 LAB
ABSOLUTE EOS #: 0.2 K/UL (ref 0–0.4)
ABSOLUTE IMMATURE GRANULOCYTE: ABNORMAL K/UL (ref 0–0.3)
ABSOLUTE LYMPH #: 1.7 K/UL (ref 1–4.8)
ABSOLUTE MONO #: 0.8 K/UL (ref 0.1–1.3)
ALBUMIN SERPL-MCNC: 3.8 G/DL (ref 3.5–5.2)
ALBUMIN/GLOBULIN RATIO: ABNORMAL (ref 1–2.5)
ALP BLD-CCNC: 97 U/L (ref 35–104)
ALT SERPL-CCNC: 10 U/L (ref 5–33)
ANION GAP SERPL CALCULATED.3IONS-SCNC: 12 MMOL/L (ref 9–17)
AST SERPL-CCNC: 22 U/L
BASOPHILS # BLD: 1 % (ref 0–2)
BASOPHILS ABSOLUTE: 0 K/UL (ref 0–0.2)
BILIRUB SERPL-MCNC: 0.42 MG/DL (ref 0.3–1.2)
BUN BLDV-MCNC: 52 MG/DL (ref 8–23)
BUN/CREAT BLD: ABNORMAL (ref 9–20)
CALCIUM SERPL-MCNC: 9.2 MG/DL (ref 8.6–10.4)
CHLORIDE BLD-SCNC: 92 MMOL/L (ref 98–107)
CO2: 24 MMOL/L (ref 20–31)
CREAT SERPL-MCNC: 2.35 MG/DL (ref 0.5–0.9)
DIFFERENTIAL TYPE: ABNORMAL
EOSINOPHILS RELATIVE PERCENT: 3 % (ref 0–4)
GFR AFRICAN AMERICAN: 24 ML/MIN
GFR NON-AFRICAN AMERICAN: 20 ML/MIN
GFR SERPL CREATININE-BSD FRML MDRD: ABNORMAL ML/MIN/{1.73_M2}
GFR SERPL CREATININE-BSD FRML MDRD: ABNORMAL ML/MIN/{1.73_M2}
GLUCOSE BLD-MCNC: 113 MG/DL (ref 70–99)
HCT VFR BLD CALC: 28.8 % (ref 36–46)
HEMOGLOBIN: 9.7 G/DL (ref 12–16)
IMMATURE GRANULOCYTES: ABNORMAL %
LYMPHOCYTES # BLD: 28 % (ref 24–44)
MAGNESIUM: 2.2 MG/DL (ref 1.6–2.6)
MCH RBC QN AUTO: 32.9 PG (ref 26–34)
MCHC RBC AUTO-ENTMCNC: 33.5 G/DL (ref 31–37)
MCV RBC AUTO: 98 FL (ref 80–100)
MONOCYTES # BLD: 14 % (ref 1–7)
NRBC AUTOMATED: ABNORMAL PER 100 WBC
PDW BLD-RTO: 18.1 % (ref 11.5–14.9)
PLATELET # BLD: 300 K/UL (ref 150–450)
PLATELET ESTIMATE: ABNORMAL
PMV BLD AUTO: 9 FL (ref 6–12)
POTASSIUM SERPL-SCNC: 4.5 MMOL/L (ref 3.7–5.3)
RBC # BLD: 2.94 M/UL (ref 4–5.2)
RBC # BLD: ABNORMAL 10*6/UL
SEG NEUTROPHILS: 54 % (ref 36–66)
SEGMENTED NEUTROPHILS ABSOLUTE COUNT: 3.3 K/UL (ref 1.3–9.1)
SODIUM BLD-SCNC: 128 MMOL/L (ref 135–144)
TOTAL PROTEIN: 6.1 G/DL (ref 6.4–8.3)
TSH SERPL DL<=0.05 MIU/L-ACNC: 3.49 MIU/L (ref 0.3–5)
WBC # BLD: 6 K/UL (ref 3.5–11)
WBC # BLD: ABNORMAL 10*3/UL

## 2021-07-30 PROCEDURE — 80053 COMPREHEN METABOLIC PANEL: CPT

## 2021-07-30 PROCEDURE — 84443 ASSAY THYROID STIM HORMONE: CPT

## 2021-07-30 PROCEDURE — 83735 ASSAY OF MAGNESIUM: CPT

## 2021-07-30 PROCEDURE — 36415 COLL VENOUS BLD VENIPUNCTURE: CPT

## 2021-07-30 PROCEDURE — 85025 COMPLETE CBC W/AUTO DIFF WBC: CPT

## 2021-07-30 NOTE — RESULT ENCOUNTER NOTE
ABNORMAL. Please notify patient. Worsening chronic kidney disease stage IV, very low sodium 128, do not drink more than 6 x 8 ounce glasses of water and other fluids a day, to absolutely avoid any type of alcohol because that decreases the sodium    Prior sodium was 144, now is towards dangerously low 128  If any confusion over the weekend to go to the emergency room      Anemia is worsening likely related to worsening kidney disease  Any blood in the stool or any black stools? ?    Future Appointments  8/9/2021   2:30 PM    [facility] RM 1              [facility]            [facility]  10/5/2021  12:45 PM   Ebony Driscoll MD     [facility]        [facility]  10/5/2021  2:30 PM    ELIZABET Mills -* fp sc               [facility]

## 2021-07-30 NOTE — RESULT ENCOUNTER NOTE
Please notify patient, normal thyroid function, I will order a basic metabolic profile, CBC,  to do on Monday or Tuesday due to prior worsening kidney function and sodium, continue current treatment    Future Appointments  8/9/2021   2:30 PM    [facility] ECHO RM 1              [facility] ECHO           [facility]  10/5/2021  12:45 PM   Mandeep Santos MD     [facility]        [facility]  10/5/2021  2:30 PM    ELIZABET Mills -* fp lilo [facility]es

## 2021-08-02 ENCOUNTER — TELEPHONE (OUTPATIENT)
Dept: FAMILY MEDICINE CLINIC | Age: 86
End: 2021-08-02

## 2021-08-02 PROBLEM — H61.21 IMPACTED CERUMEN OF RIGHT EAR: Status: RESOLVED | Noted: 2020-08-19 | Resolved: 2021-08-02

## 2021-08-02 PROBLEM — R01.1 MURMUR, CARDIAC: Status: ACTIVE | Noted: 2021-08-02

## 2021-08-02 PROBLEM — R22.43 LOCALIZED SWELLING OF BOTH LOWER LEGS: Status: ACTIVE | Noted: 2021-08-02

## 2021-08-02 PROBLEM — E83.42 HYPOMAGNESEMIA: Status: ACTIVE | Noted: 2021-08-02

## 2021-08-02 RX ORDER — OMEPRAZOLE 20 MG/1
CAPSULE, DELAYED RELEASE ORAL
COMMUNITY
Start: 2021-07-28 | End: 2021-10-05

## 2021-08-02 NOTE — TELEPHONE ENCOUNTER
Please inform pt's son, Jose Santana about this, we didn't inform them yet     Namrata Cui MD   7/30/2021  5:26 PM EDT       Please notify patient, normal thyroid function, I will order a basic metabolic profile, CBC,  to do on Monday or Tuesday due to prior worsening kidney function and sodium, continue current treatment     Future Appointments  8/9/2021   2:30 PM    [facility] ECHO RM 1              [facility] ECHO           [facility]  10/5/2021  12:45 PM   Axel Johnson MD     [facility]        [facility]  10/5/2021  2:30 PM    ELIZABET Mills -* fp lilo [facility] [employer]

## 2021-08-09 ENCOUNTER — HOSPITAL ENCOUNTER (OUTPATIENT)
Dept: NON INVASIVE DIAGNOSTICS | Age: 86
Discharge: HOME | End: 2021-08-09
Payer: MEDICARE

## 2021-08-09 ENCOUNTER — HOSPITAL ENCOUNTER (OUTPATIENT)
Age: 86
Discharge: HOME | End: 2021-08-09
Payer: MEDICARE

## 2021-08-09 DIAGNOSIS — R01.1 MURMUR, CARDIAC: ICD-10-CM

## 2021-08-09 DIAGNOSIS — E87.1 HYPONATREMIA: ICD-10-CM

## 2021-08-09 DIAGNOSIS — N18.4 CKD (CHRONIC KIDNEY DISEASE) STAGE 4, GFR 15-29 ML/MIN (HCC): ICD-10-CM

## 2021-08-09 DIAGNOSIS — D63.8 ANEMIA, CHRONIC DISEASE: ICD-10-CM

## 2021-08-09 LAB
ANION GAP SERPL CALCULATED.3IONS-SCNC: 14 MMOL/L (ref 9–17)
BUN BLDV-MCNC: 24 MG/DL (ref 8–23)
BUN/CREAT BLD: ABNORMAL (ref 9–20)
CALCIUM SERPL-MCNC: 9.4 MG/DL (ref 8.6–10.4)
CHLORIDE BLD-SCNC: 96 MMOL/L (ref 98–107)
CO2: 23 MMOL/L (ref 20–31)
CREAT SERPL-MCNC: 1.64 MG/DL (ref 0.5–0.9)
GFR AFRICAN AMERICAN: 36 ML/MIN
GFR NON-AFRICAN AMERICAN: 30 ML/MIN
GFR SERPL CREATININE-BSD FRML MDRD: ABNORMAL ML/MIN/{1.73_M2}
GFR SERPL CREATININE-BSD FRML MDRD: ABNORMAL ML/MIN/{1.73_M2}
GLUCOSE BLD-MCNC: 93 MG/DL (ref 70–99)
HCT VFR BLD CALC: 30.4 % (ref 36–46)
HEMOGLOBIN: 10.1 G/DL (ref 12–16)
LV EF: 55 %
LVEF MODALITY: NORMAL
MCH RBC QN AUTO: 33.2 PG (ref 26–34)
MCHC RBC AUTO-ENTMCNC: 33.1 G/DL (ref 31–37)
MCV RBC AUTO: 100.2 FL (ref 80–100)
NRBC AUTOMATED: ABNORMAL PER 100 WBC
PDW BLD-RTO: 18.1 % (ref 11.5–14.9)
PLATELET # BLD: 278 K/UL (ref 150–450)
PMV BLD AUTO: 8.7 FL (ref 6–12)
POTASSIUM SERPL-SCNC: 4.9 MMOL/L (ref 3.7–5.3)
RBC # BLD: 3.04 M/UL (ref 4–5.2)
SODIUM BLD-SCNC: 133 MMOL/L (ref 135–144)
WBC # BLD: 4.4 K/UL (ref 3.5–11)

## 2021-08-09 PROCEDURE — 80048 BASIC METABOLIC PNL TOTAL CA: CPT

## 2021-08-09 PROCEDURE — 85027 COMPLETE CBC AUTOMATED: CPT

## 2021-08-09 PROCEDURE — 36415 COLL VENOUS BLD VENIPUNCTURE: CPT

## 2021-08-09 PROCEDURE — 93306 TTE W/DOPPLER COMPLETE: CPT

## 2021-08-09 NOTE — RESULT ENCOUNTER NOTE
ABNORMAL. Please notify patient's son. Labs are improved chronic kidney disease now in stage III improved. Sodium is also improved 133, was 128 before  Anemia is also improved  Please discuss with patient's son if he wants me to place a standing order to have the blood work done at least every 1 or 2 weeks? Please also advise him to continue to have the fluid restriction  6 x 8 oz glasses of all fluids in a day, and less alcohol like once every other day, whatever she is doing now it is working and the sodium has improved,and just trying to keep his mom out of the hospital and safe and happy if possible .     Future Appointments  10/5/2021  12:45 PM   Deidre Kahn MD     [facility]        [facility]  10/5/2021  2:30 PM    [facility] Mills [facility]

## 2021-08-09 NOTE — RESULT ENCOUNTER NOTE
Noted  Future Appointments  10/5/2021  12:45 PM   Chanel Llamas MD     [facility]        [facility]  10/5/2021  2:30 PM    [facility]

## 2021-08-10 NOTE — RESULT ENCOUNTER NOTE
ABNORMAL. Please notify patient's son . There are some chronic changes in the heart, but overall not bad at all    Normal ejection fraction, mild left ventricular hypertrophy, 2 leaky valves, mild to moderate mitral regurgitation, and moderate tricuspid regurgitation, for those just monitoring is done.   Pulmonary hypertension is mild    Continue blood pressure medications as given  If they would like to see a cardiologist please let me know if not we will continue to monitor    Future Appointments  10/5/2021  12:45 PM   Faizan Noyola MD     [facility]        [facility]  10/5/2021  2:30 PM    ELIZABET Mills -* fp sc               [address] [facility]

## 2021-10-05 ENCOUNTER — OFFICE VISIT (OUTPATIENT)
Dept: FAMILY MEDICINE CLINIC | Age: 86
End: 2021-10-05
Payer: MEDICARE

## 2021-10-05 VITALS
TEMPERATURE: 97.3 F | SYSTOLIC BLOOD PRESSURE: 134 MMHG | WEIGHT: 142 LBS | HEART RATE: 85 BPM | DIASTOLIC BLOOD PRESSURE: 76 MMHG | OXYGEN SATURATION: 99 % | BODY MASS INDEX: 27.88 KG/M2 | HEIGHT: 60 IN

## 2021-10-05 DIAGNOSIS — E78.5 HYPERLIPIDEMIA WITH TARGET LDL LESS THAN 100: ICD-10-CM

## 2021-10-05 DIAGNOSIS — N18.32 STAGE 3B CHRONIC KIDNEY DISEASE (HCC): ICD-10-CM

## 2021-10-05 DIAGNOSIS — N18.31 ANEMIA DUE TO STAGE 3A CHRONIC KIDNEY DISEASE (HCC): ICD-10-CM

## 2021-10-05 DIAGNOSIS — D63.1 ANEMIA DUE TO STAGE 3A CHRONIC KIDNEY DISEASE (HCC): ICD-10-CM

## 2021-10-05 DIAGNOSIS — I10 ESSENTIAL HYPERTENSION: ICD-10-CM

## 2021-10-05 DIAGNOSIS — Z23 NEED FOR PROPHYLACTIC VACCINATION AND INOCULATION AGAINST VARICELLA: ICD-10-CM

## 2021-10-05 DIAGNOSIS — Z00.00 ENCOUNTER FOR ANNUAL WELLNESS VISIT (AWV) IN MEDICARE PATIENT: Primary | ICD-10-CM

## 2021-10-05 DIAGNOSIS — M12.9 ARTHRITIS INVOLVING MULTIPLE SITES: ICD-10-CM

## 2021-10-05 DIAGNOSIS — M1A.0721 IDIOPATHIC CHRONIC GOUT OF LEFT FOOT WITH TOPHUS: ICD-10-CM

## 2021-10-05 PROCEDURE — G8484 FLU IMMUNIZE NO ADMIN: HCPCS | Performed by: FAMILY MEDICINE

## 2021-10-05 PROCEDURE — G0439 PPPS, SUBSEQ VISIT: HCPCS | Performed by: FAMILY MEDICINE

## 2021-10-05 PROCEDURE — 4040F PNEUMOC VAC/ADMIN/RCVD: CPT | Performed by: FAMILY MEDICINE

## 2021-10-05 PROCEDURE — 1123F ACP DISCUSS/DSCN MKR DOCD: CPT | Performed by: FAMILY MEDICINE

## 2021-10-05 RX ORDER — ALLOPURINOL 100 MG/1
100 TABLET ORAL DAILY
Qty: 90 TABLET | Refills: 3 | Status: SHIPPED | OUTPATIENT
Start: 2021-10-05 | End: 2022-11-04

## 2021-10-05 RX ORDER — ZOSTER VACCINE RECOMBINANT, ADJUVANTED 50 MCG/0.5
0.5 KIT INTRAMUSCULAR ONCE
Qty: 0.5 ML | Refills: 0 | Status: CANCELLED | OUTPATIENT
Start: 2021-10-05 | End: 2021-10-05

## 2021-10-05 ASSESSMENT — LIFESTYLE VARIABLES
HOW OFTEN DURING THE LAST YEAR HAVE YOU FOUND THAT YOU WERE NOT ABLE TO STOP DRINKING ONCE YOU HAD STARTED: 0
HOW OFTEN DURING THE LAST YEAR HAVE YOU HAD A FEELING OF GUILT OR REMORSE AFTER DRINKING: 0
HOW OFTEN DURING THE LAST YEAR HAVE YOU FAILED TO DO WHAT WAS NORMALLY EXPECTED FROM YOU BECAUSE OF DRINKING: 0
HOW OFTEN DO YOU HAVE A DRINK CONTAINING ALCOHOL: 4
AUDIT TOTAL SCORE: 4
HOW OFTEN DURING THE LAST YEAR HAVE YOU BEEN UNABLE TO REMEMBER WHAT HAPPENED THE NIGHT BEFORE BECAUSE YOU HAD BEEN DRINKING: 0
HAVE YOU OR SOMEONE ELSE BEEN INJURED AS A RESULT OF YOUR DRINKING: 0
HOW OFTEN DO YOU HAVE SIX OR MORE DRINKS ON ONE OCCASION: 0
HOW OFTEN DURING THE LAST YEAR HAVE YOU NEEDED AN ALCOHOLIC DRINK FIRST THING IN THE MORNING TO GET YOURSELF GOING AFTER A NIGHT OF HEAVY DRINKING: 0
HOW MANY STANDARD DRINKS CONTAINING ALCOHOL DO YOU HAVE ON A TYPICAL DAY: 0
HAS A RELATIVE, FRIEND, DOCTOR, OR ANOTHER HEALTH PROFESSIONAL EXPRESSED CONCERN ABOUT YOUR DRINKING OR SUGGESTED YOU CUT DOWN: 0
AUDIT-C TOTAL SCORE: 4

## 2021-10-05 ASSESSMENT — PATIENT HEALTH QUESTIONNAIRE - PHQ9
SUM OF ALL RESPONSES TO PHQ QUESTIONS 1-9: 0
2. FEELING DOWN, DEPRESSED OR HOPELESS: 0
1. LITTLE INTEREST OR PLEASURE IN DOING THINGS: 0
SUM OF ALL RESPONSES TO PHQ9 QUESTIONS 1 & 2: 0
SUM OF ALL RESPONSES TO PHQ QUESTIONS 1-9: 0
SUM OF ALL RESPONSES TO PHQ QUESTIONS 1-9: 0

## 2021-10-05 NOTE — PROGRESS NOTES
[address]  [address] [facility] [employer] [address]  Dept: [phone]     Medicare Annual Wellness Visit  Name: Brenton Christine Date: 10/5/2021   MRN: [id number] Sex: Female   Age: 81 y.o. Ethnicity: Non- / Non    : 1935 Race: White (non-)      Bonnie Marmolejo is here for [employer] and Immunizations (YES HAD COVID-19 VACCINE)    Screenings for behavioral, psychosocial and functional/safety risks, and cognitive dysfunction are all negative except as indicated below. These results, as well as other patient data from the [address] form, are documented in Flowsheets linked to this Encounter. Allergies   Allergen Reactions    Norvasc [Amlodipine Besylate] Other (See Comments)     Edema         Prior to Visit Medications    Medication Sig Taking?  Authorizing Provider   Carboxymethylcellulose Sodium ([employer] LUBRICANT EYE DROPS OP) Apply to eye Yes Historical Provider, MD   allopurinol (ZYLOPRIM) 100 MG tablet Take 1 tablet by mouth daily Yes ELIZABET Mills CNP   zoster recombinant adjuvanted vaccine (SHINGRIX) 50 MCG/0.5ML SUSR injection Inject 0.5 mLs into the muscle once for 1 dose Yes ELIZABET Mills CNP   Multiple Vitamins-Minerals (OCUVITE PO) Take by mouth Yes Historical Provider, MD   cloNIDine (CATAPRES) 0.2 MG tablet Take 1 tablet by mouth 2 times daily Yes Pawan Nielson MD   Magnesium Oxide (MAGNESIUM-OXIDE) 250 MG TABS tablet Take 1 tablet by mouth 2 times daily Yes Pawan Nielson MD   sodium chloride 1 g tablet Take 1 tablet by mouth 3 times daily Yes Pawan Nielson MD   omeprazole 20 MG EC tablet Take 1 tablet by mouth every morning (before breakfast) Yes Pawan Nielson MD   furosemide (LASIX) 20 MG tablet Take 1 tablet by mouth daily Yes Pawan Nielson MD   atorvastatin (LIPITOR) 20 MG tablet Take 1 tablet by mouth nightly Yes Mikayla MD Tucker   hydrALAZINE (APRESOLINE) 25 MG tablet TAKE 1 TABLET THREE TIMES A DAY Yes Lanette Corado MD   iron polysaccharides (NIFEREX) 150 MG capsule Take 1 capsule by mouth daily Yes Lanette Corado MD   Multiple Vitamins-Minerals (CENTRUM SILVER 50+WOMEN PO) Take 1 tablet by mouth Yes Historical Provider, MD   Handicap Placard MISC by Does not apply route Expires March 2026 Yes Randi Coughlin APRN - CNP   aspirin 81 MG tablet Take 1 tablet by mouth daily Yes Patricia Valdez MD         Past Medical History:   Diagnosis Date    Acute kidney injury superimposed on chronic kidney disease ([address] 8/14/2020    CKD (chronic kidney disease) stage 3, GFR 30-59 ml/min ([address]     Depression     Essential hypertension 3/26/2021    GERD (gastroesophageal reflux disease)     Gout     Hyperlipidemia 3/22/2016    Hypertension     Hyponatremia     Metabolic acidosis     Osteoporosis     Secondary hyperpar[facility]roidism [facility] [address] 8/19/2020    Significant drop in hemoglobin ([facility] 8/15/2020       Past Surgical History:   Procedure Laterality Date    APPENDECTOMY      BLADDER SUSPENSION      EYE SURGERY      bilat catract removal    HYSTERECTOMY      SHOULDER ARTHROPLASTY Bilateral     TOTAL HIP ARTHROPLASTY Bilateral     TOTAL KNEE ARTHROPLASTY Bilateral          Family History   Problem Relation Age of Onset    Cancer Mother         breast cancer    Emphysema Mother     Cancer Father         Prostate cancer    Heart Disease Maternal Grandfather     Heart Disease Paternal Grandfather        CareTeam (Including outside providers/suppliers regularly involved in providing care):   Patient Care Team:  Lanette Corado MD as PCP - General (Family Medicine)  Lanette Corado MD as PCP - [address] [place] [address] Provider  Andrew Garcia MD as Consulting Physician (Nephrology)  Kel Zaidi MD as Consulting Physician (Nephrology)  Violetta Jurado MD as Consulting Physician (Hematology and Oncology)    Wt Readings from Last 3 Encounters:   10/05/21 142 lb (64.4 kg)   10/05/21 141 lb 9.6 oz (64.2 kg)   21 145 lb 6.4 oz (66 kg)     Vitals:    10/05/21 1346   BP: 134/76   Pulse: 85   Temp: 97.3 °F (36.3 °C)   SpO2: 99%   Weight: 142 lb (64.4 kg)   Height: 5' (1.524 m)     Body mass index is 27.73 kg/m². Based upon direct observation of the patient, evaluation of cognition reveals recent and remote memory intact. Physical Exam  Vitals and nursing note reviewed. Constitutional:       Appearance: She is well-developed and normal weight. HENT:      Head: Normocephalic. Right Ear: Tympanic membrane and external ear normal.      Left Ear: Tympanic membrane and external ear normal.      Nose: Nose normal.      Mouth/Throat:      Mouth: Mucous membranes are moist.   Eyes:      Pupils: Pupils are equal, round, and reactive to light. Neck:      Thyroid: No thyromegaly. Vascular: No JVD. Cardiovascular:      Rate and Rhythm: Normal rate and regular rhythm. Pulses: Normal pulses. Heart sounds: Normal heart sounds. No murmur heard. Pulmonary:      Effort: Pulmonary effort is normal. No respiratory distress. Breath sounds: Normal breath sounds. Abdominal:      General: Bowel sounds are normal. There is no distension. Palpations: Abdomen is soft. Tenderness: There is no abdominal tenderness. Musculoskeletal:      Right hand: Tenderness present. Decreased range of motion. Left hand: Tenderness present. Decreased range of motion. Cervical back: Normal range of motion and neck supple. Thoracic back: Tenderness present. Decreased range of motion. Lumbar back: Tenderness present. Decreased range of motion. Right lower le+ Edema present. Left lower le+ Edema present. Lymphadenopathy:      Cervical: No cervical adenopathy. Skin:     General: Skin is warm and dry.       Capillary Refill: Capillary refill takes less than 2 seconds. Findings: Bruising present. Comments: Both arms   Neurological:      Mental Status: She is alert and oriented to person, place, and time. Psychiatric:         Mood and Affect: Mood is anxious. Speech: Speech is rapid and pressured. Behavior: Behavior normal.         Thought Content: Thought content does not include homicidal or suicidal ideation. Patient's complete Health Risk Assessment and screening values have been reviewed and are found in Flowsheets. The following problems were reviewed today and where indicated follow up appointments were made and/or referrals ordered.     Positive Risk Factor Screenings with Interventions:           Health Habits/Nutrition:  Health Habits/Nutrition  Do you exercise for at least 20 minutes 2-3 times per week?: Yes  Have you lost any weight without trying in the past 3 months?: No  Do you eat only one meal per day?: No  Have you seen the dentist within the past year?: (!) No  Body mass index: (!) 27.73  Health Habits/Nutrition Interventions:  · Inadequate physical activity:  multiple joint arthritis  · Dental exam overdue:  patient declines dental evaluation     Declined to see the dentist,         Personalized Preventive Plan   Current Health Maintenance Status  Immunization History   Administered Date(s) Administered    COVID-19, Madden Peter, PF, 30mcg/0.3mL 03/22/2021, 04/12/2021    Influenza Virus Vaccine 11/05/2013, 10/02/2014    Influenza, High Dose (Fluzone 65 yrs and older) 10/29/2018    Influenza, Renata Skill, 6-35 Months, IM (Fluzone,Afluria) 09/15/2017    Influenza, Renata Skill, IM, (6 mo and older Fluzone, Flulaval, Fluarix and 3 yrs and older Afluria) 11/01/2016    Influenza, Triv, inactivated, subunit, adjuvanted, IM (Fluad 65 yrs and older) 10/10/2019    Pneumococcal Conjugate 13-valent ([id number]) 10/10/2019    Pneumococcal Conjugate 7-valent (Prevnar7) 09/17/2013    Pneumococcal Polysaccharide ([id number]) 11/01/2016 Health Maintenance   Topic Date Due    DTaP/Tdap/Td vaccine (1 - Tdap) Never done    Shingles Vaccine (1 of 2) Never done   [employer] Visit (AWV)  Never done    Flu vaccine (1) 09/01/2021    Lipid screen  03/17/2022    Potassium monitoring  08/09/2022    Creatinine monitoring  08/09/2022    DEXA (modify frequency per FRAX score)  Completed    Pneumococcal 65+ yrs at Risk Vaccine  Completed    COVID-19 Vaccine  Completed    Hepatitis A vaccine  Aged Out    Hepatitis B vaccine  Aged Out    Hib vaccine  Aged Out    Meningococcal (ACWY) vaccine  Aged Out     Recommendations for [place] Due: see orders and patient instructions/AVS.  . Recommended screening schedule for the next 5-10 years is provided to the patient in written form: see Patient Gabriella Jamison was seen today for medicare awv and immunizations. Diagnoses and all orders for this visit:    Encounter for annual wellness visit (AWV) in Medicare patient    Essential hypertension  Stable  Continue current therapy. DISCUSSED AND ADVISED TO:  Cut down on your salt intake. Cut down on caffeinated drinks, sports drinks. Instructed to check BP at home regularly. Report for any chest pains, shortness of breath, headaches, and lightheadedness. Call the office if your blood pressure continue to be higher than 140/90 or 90/50. Stage 3b chronic kidney disease ([address]  Stable  Just saw nephrology  Labs in 1 month  Follow up in 6 months    Hyperlipidemia with target LDL less than 100  Stable  Continue therapy. Advised to decrease the consumption of red meats, fried foods, trans fats, sweets, sugary beverages. Advised to increase fish, vegetables, and fruits consumption. Advised to add fiber or OTC supplements in diet. Discussed weight loss which will result in improvement of lipids levels. Advised to increase daily physical activities and add regular exercises.       Anemia due to stage 3a chronic kidney disease ([address]  Stable  Continue the same therapy. DISCUSSED and ADVISED TO:  Make iron-rich foods a part daily diet. Eat foods with vitamin C along with iron-rich foods. Vitamin C helps absorb more iron from food. Eat meat and vegetables or grains together. Arthritis involving multiple sites  Failure to Improve  Continue current therapy. DISCUSSED and ADVISED TO:  Stay at a healthy weight. Continue exercises/PT  Stretch to help prevent stiffness and to prevent injury before exercise. Gentle forms of yoga help keep joints and muscles flexible. Walk instead of jog, ride a bike, swim, and water exercise. Lift weights as tolerated. strong muscles help reduce stress on joints. Take pain medicines exactly as directed and only as needed. 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 Reviewed steps to reduce disease risk including:   · Quitting tobacco use, reducing amount smoked, or not starting the habit  · Making healthy food choices  · Being physically active and gradualy increasing activity levels   · Reduce weight and determine a healthy BMI goal  · Monitor blood pressure and treat if higher than 140/90 mmHg  · Maintain blood total cholesterol levels under 5 mmol/l or 190 mg/dl  · Maintain LDL cholesterol levels under 3.0 mmol/l or 115 mg/dl   · Control blood glucose levels  · Consider taking aspirin (75 mg daily), once blood pressure is controlled   Provided a follow up plan.   Time spent (minutes): 5    This note was completed by using the assistance of a speech-recognition program. However, inadvertent computerized transcription errors may be present. Although every effort was made to ensure accuracy, no guarantees can be provided that every mistake has been identified and corrected by editing.   Electronically signed by ELIZABET Cruz CNP on 10/5/21 at 1:59 PM EDT

## 2021-10-05 NOTE — PROGRESS NOTES
Visit Information    Have you changed or started any medications since your last visit including any over-the-counter medicines, vitamins, or herbal medicines? no   Have you stopped taking any of your medications? Is so, why? -  no  Are you having any side effects from any of your medications? - no    Have you seen any other physician or provider since your last visit? yes -    Have you had any other diagnostic tests since your last visit?  no   Have you been seen in the emergency room and/or had an admission in a hospital since we last saw you?  no   Have you had your routine dental cleaning in the past 6 months?  no     Do you have an active MyChart account? If no, what is the barrier?   Yes    Patient Care Team:  Samaria Fuller MD as PCP - General (Family Medicine)  Samaria Fuller MD as PCP - [facility]  Henrry Ingram MD as Consulting Physician (Nephrology)  Maureen Ordaz MD as Consulting Physician (Nephrology)  Nerissa Guzman MD as Consulting Physician (Hematology and Oncology)    Medical History Review  Past Medical, Family, and Social History reviewed and does contribute to the patient presenting condition    Health Maintenance   Topic Date Due    DTaP/Tdap/Td vaccine (1 - Tdap) Never done    Shingles Vaccine (1 of 2) Never done   [employer] Visit (AWV)  Never done    Flu vaccine (1) 09/01/2021    Lipid screen  03/17/2022    Potassium monitoring  08/09/2022    Creatinine monitoring  08/09/2022    DEXA (modify frequency per FRAX score)  Completed    Pneumococcal 65+ yrs at Risk Vaccine  Completed    COVID-19 Vaccine  Completed    Hepatitis A vaccine  Aged Out    Hepatitis B vaccine  Aged Out    Hib vaccine  Aged Out    Meningococcal (ACWY) vaccine  Aged Out

## 2021-10-07 ENCOUNTER — INITIAL CONSULT (OUTPATIENT)
Dept: ONCOLOGY | Age: 86
End: 2021-10-07
Payer: MEDICARE

## 2021-10-07 ENCOUNTER — HOSPITAL ENCOUNTER (OUTPATIENT)
Age: 86
Setting detail: SPECIMEN
Discharge: HOME | End: 2021-10-07
Payer: MEDICARE

## 2021-10-07 VITALS
BODY MASS INDEX: 26.62 KG/M2 | RESPIRATION RATE: 15 BRPM | HEART RATE: 89 BPM | HEIGHT: 61 IN | OXYGEN SATURATION: 98 % | DIASTOLIC BLOOD PRESSURE: 80 MMHG | TEMPERATURE: 97 F | WEIGHT: 141 LBS | SYSTOLIC BLOOD PRESSURE: 131 MMHG

## 2021-10-07 DIAGNOSIS — D50.0 IRON DEFICIENCY ANEMIA DUE TO CHRONIC BLOOD LOSS: ICD-10-CM

## 2021-10-07 DIAGNOSIS — D50.0 IRON DEFICIENCY ANEMIA DUE TO CHRONIC BLOOD LOSS: Primary | ICD-10-CM

## 2021-10-07 LAB
ABSOLUTE EOS #: 0.18 K/UL (ref 0–0.44)
ABSOLUTE IMMATURE GRANULOCYTE: <0.03 K/UL (ref 0–0.3)
ABSOLUTE LYMPH #: 1.33 K/UL (ref 1.1–3.7)
ABSOLUTE MONO #: 0.47 K/UL (ref 0.1–1.2)
ABSOLUTE RETIC #: 0.06 M/UL (ref 0.03–0.08)
BASOPHILS # BLD: 1 % (ref 0–2)
BASOPHILS ABSOLUTE: 0.04 K/UL (ref 0–0.2)
DIFFERENTIAL TYPE: ABNORMAL
EOSINOPHILS RELATIVE PERCENT: 3 % (ref 1–4)
FERRITIN: 84 UG/L (ref 13–150)
FOLATE: 15.5 NG/ML
FREE KAPPA/LAMBDA RATIO: 0.74 (ref 0.26–1.65)
HCT VFR BLD CALC: 32.6 % (ref 36.3–47.1)
HEMOGLOBIN: 10.7 G/DL (ref 11.9–15.1)
IMMATURE GRANULOCYTES: 0 %
IMMATURE RETIC FRACT: 10.3 % (ref 2.7–18.3)
IRON SATURATION: 31 % (ref 20–55)
IRON: 82 UG/DL (ref 37–145)
KAPPA FREE LIGHT CHAINS QNT: 4.3 MG/DL (ref 0.37–1.94)
LACTATE DEHYDROGENASE: 238 U/L (ref 135–214)
LAMBDA FREE LIGHT CHAINS QNT: 5.82 MG/DL (ref 0.57–2.63)
LYMPHOCYTES # BLD: 24 % (ref 24–43)
MCH RBC QN AUTO: 32.3 PG (ref 25.2–33.5)
MCHC RBC AUTO-ENTMCNC: 32.8 G/DL (ref 28.4–34.8)
MCV RBC AUTO: 98.5 FL (ref 82.6–102.9)
MONOCYTES # BLD: 8 % (ref 3–12)
NRBC AUTOMATED: 0 PER 100 WBC
PDW BLD-RTO: 18.2 % (ref 11.8–14.4)
PLATELET # BLD: 256 K/UL (ref 138–453)
PLATELET ESTIMATE: ABNORMAL
PMV BLD AUTO: 11.9 FL (ref 8.1–13.5)
RBC # BLD: 3.31 M/UL (ref 3.95–5.11)
RBC # BLD: ABNORMAL 10*6/UL
RETIC %: 1.7 % (ref 0.5–1.9)
RETIC HEMOGLOBIN: 39.2 PG (ref 28.2–35.7)
SEG NEUTROPHILS: 64 % (ref 36–65)
SEGMENTED NEUTROPHILS ABSOLUTE COUNT: 3.54 K/UL (ref 1.5–8.1)
TOTAL IRON BINDING CAPACITY: 262 UG/DL (ref 250–450)
UNSATURATED IRON BINDING CAPACITY: 180 UG/DL (ref 112–347)
VITAMIN B-12: 467 PG/ML (ref 232–1245)
WBC # BLD: 5.6 K/UL (ref 3.5–11.3)
WBC # BLD: ABNORMAL 10*3/UL

## 2021-10-07 PROCEDURE — G8417 CALC BMI ABV UP PARAM F/U: HCPCS | Performed by: INTERNAL MEDICINE

## 2021-10-07 PROCEDURE — G8484 FLU IMMUNIZE NO ADMIN: HCPCS | Performed by: INTERNAL MEDICINE

## 2021-10-07 PROCEDURE — G8427 DOCREV CUR MEDS BY ELIG CLIN: HCPCS | Performed by: INTERNAL MEDICINE

## 2021-10-07 PROCEDURE — 1090F PRES/ABSN URINE INCON ASSESS: CPT | Performed by: INTERNAL MEDICINE

## 2021-10-07 PROCEDURE — 99204 OFFICE O/P NEW MOD 45 MIN: CPT | Performed by: INTERNAL MEDICINE

## 2021-10-07 NOTE — PROGRESS NOTES
Patient ID: Lonnie Shipley, 1935, [id number], 81 y.o. Referred by : Suyapa Cedillo MD  Reason for consultation:   Anemia  HISTORY OF PRESENT ILLNESS:    Hematologic History:    Lonnie Shipley is a 81 y.o. female with history of chronic kidney disease, GERD was seen during initial consultation visit for mild anemia. Patient reported that she has been taking iron pill for past 6 to 8 months. She denies any bleeding, blood in stool. She denies any constipation. She takes iron pill once daily. She appears to have chronic anemia and recently in March of this year her hemoglobin was around 8.4. Her most recent hemoglobin on 8/9/2021 is 10.1. WBC 4.4, platelets 073. 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 She is following with nephrology for her chronic kidney disease and hyponatremia.           Past Medical History:   Diagnosis Date    Acute kidney injury superimposed on chronic kidney disease ([facility] [address] 8/14/2020    CKD (chronic kidney disease) stage 3, GFR 30-59 ml/min ([facility])     Depression     Essential hypertension 3/26/2021    GERD (gastroesophageal reflux disease)     Gout     Hyperlipidemia 3/22/2016    Hypertension     Hyponatremia     Metabolic acidosis     Osteoporosis     Secondary hyperparathyroidism ([address] 8/19/2020    Significant drop in hemoglobin ([address] 8/15/2020       Past Surgical History:   Procedure Laterality Date    APPENDECTOMY      BLADDER SUSPENSION      EYE SURGERY      bilat catract removal    HYSTERECTOMY      SHOULDER ARTHROPLASTY Bilateral     TOTAL HIP ARTHROPLASTY Bilateral     TOTAL KNEE ARTHROPLASTY Bilateral        Allergies   Allergen Reactions    Norvasc [Amlodipine Besylate] Other (See Comments)     Edema       Current Outpatient Medications   Medication Sig Dispense Refill    Carboxymethylcellulose Sodium ([employer]S LUBRICANT EYE DROPS OP) Apply to eye      allopurinol (ZYLOPRIM) 100 MG tablet Take 1 tablet by mouth daily 90 tablet 3    Multiple Vitamins-Minerals (OCUVITE PO) Take by mouth      cloNIDine (CATAPRES) 0.2 MG tablet Take 1 tablet by mouth 2 times daily 180 tablet 3    Magnesium Oxide (MAGNESIUM-OXIDE) 250 MG TABS tablet Take 1 tablet by mouth 2 times daily 180 tablet 2    sodium chloride 1 g tablet Take 1 tablet by mouth 3 times daily 270 tablet 3    omeprazole 20 MG EC tablet Take 1 tablet by mouth every morning (before breakfast) 90 tablet 3    furosemide (LASIX) 20 MG tablet Take 1 tablet by mouth daily 90 tablet 3    atorvastatin (LIPITOR) 20 MG tablet Take 1 tablet by mouth nightly 90 tablet 3    hydrALAZINE (APRESOLINE) 25 MG tablet TAKE 1 TABLET THREE TIMES A  tablet 3    iron polysaccharides (NIFEREX) 150 MG capsule Take 1 capsule by mouth daily 180 capsule 3    Multiple Vitamins-Minerals (CENTRUM SILVER 50+WOMEN PO) Take 1 tablet by mouth      Handicap Placard MISC by Does not apply route Expires March 2026 1 each 0    aspirin 81 MG tablet Take 1 tablet by mouth daily 90 tablet 1     No current facility-administered medications for this visit. Social History     Socioeconomic History    Marital status:      Spouse name: Not on file    Number of children: Not on file    Years of education: Not on file    Highest education level: Not on file   Occupational History    Not on file   Tobacco Use    Smoking status: Never Smoker    Smokeless tobacco: Never Used   Substance and Sexual Activity    Alcohol use:  Yes     Alcohol/week: 2.0 standard drinks     Types: 2 Standard drinks or equivalent per week     Comment: occasionally    Drug use: No    Sexual activity: Not on file   Other Topics Concern    Not on file   Social History Narrative    Not on file     Social Determinants of Health     Financial Resource Strain: Low Risk     Difficulty of Paying Living Expenses: Not hard at all   Food Insecurity: No Food Insecurity    Worried About Running Out of Food in the Last Year: Never true    Ran Out of Food in the Last Year: Never true   Transportation Needs:     Lack of Transportation (Medical):  Lack of Transportation (Non-Medical):    Physical Activity:     Days of Exercise per Week:     Minutes of Exercise per Session:    Stress:     Feeling of Stress :    Social Connections:     Frequency of Communication with Friends and Family:     Frequency of Social Gatherings with Friends and Family:     Attends Mosque Services:     Active Member of Clubs or Organizations:     Attends Club or Organization Meetings:     Marital Status:    Intimate Partner Violence:     Fear of Current or Ex-Partner:     Emotionally Abused:     Physically Abused:     Sexually Abused:        Family History   Problem Relation Age of Onset    Cancer Mother         breast cancer    Emphysema Mother     Cancer Father         Prostate cancer    Heart Disease Maternal Grandfather     Heart Disease Paternal Grandfather         REVIEW OF SYSTEM:     Constitutional: No fever or chills. No night sweats, no weight loss   Eyes: No eye discharge, double vision, or eye pain   HEENT: negative for sore mouth, sore throat, hoarseness and voice change   Respiratory: negative for cough , sputum, dyspnea, wheezing, hemoptysis, chest pain   Cardiovascular: negative for chest pain, dyspnea, palpitations, orthopnea, PND   Gastrointestinal: negative for nausea, vomiting, diarrhea, constipation, abdominal pain, Dysphagia, hematemesis and hematochezia   Genitourinary: negative for frequency, dysuria, nocturia, urinary incontinence, and hematuria   Integument: negative for rash, skin lesions, bruises.    Hematologic/Lymphatic: negative for easy bruising, bleeding, lymphadenopathy, petechiae and swelling/edema   Endocrine: negative for heat or cold intolerance, tremor, weight changes, change in bowel habits and hair loss   Musculoskeletal: negative for myalgias, arthralgias, pain, joint swelling,and bone pain   Neurological: negative for headaches, dizziness, seizures, weakness, numbness       OBJECTIVE:         Vitals:    10/07/21 1522   BP: 131/80   Pulse: 89   Resp: 15   Temp: 97 °F (36.1 °C)   SpO2: 98%       PHYSICAL EXAM:   General appearance - well appearing, no in pain or distress   Mental status - alert and cooperative   Eyes - pupils equal and reactive, extraocular eye movements intact   Ears - bilateral TM's and external ear canals normal   Mouth - mucous membranes moist, pharynx normal without lesions   Neck - supple, no significant adenopathy   Lymphatics - no palpable lymphadenopathy, no hepatosplenomegaly   Chest - clear to auscultation, no wheezes, rales or rhonchi, symmetric air entry   Heart - normal rate, regular rhythm, normal S1, S2, no murmurs, rubs, clicks or gallops   Abdomen - soft, nontender, nondistended, no masses or organomegaly   Neurological - alert, oriented, normal speech, no focal findings or movement disorder noted   Musculoskeletal - no joint tenderness, deformity or swelling   Extremities - peripheral pulses normal, no pedal edema, no clubbing or cyanosis   Skin - normal coloration and turgor, no rashes, no suspicious skin lesions noted ,      LABORATORY DATA:     Lab Results   Component Value Date    WBC 4.4 08/09/2021    HGB 10.1 (L) 08/09/2021    HCT 30.4 (L) 08/09/2021    .2 (H) 08/09/2021     08/09/2021    LYMPHOPCT 28 07/30/2021    RBC 3.04 (L) 08/09/2021    MCH 33.2 08/09/2021    MCHC 33.1 08/09/2021    RDW 18.1 (H) 08/09/2021    MONOPCT 14 (H) 07/30/2021    BASOPCT 1 07/30/2021    NEUTROABS 3.30 07/30/2021    LYMPHSABS 1.70 07/30/2021    MONOSABS 0.80 07/30/2021    EOSABS 0.20 07/30/2021    BASOSABS 0.00 07/30/2021         Chemistry        Component Value Date/Time     (L) 08/09/2021 1352    K 4.9 08/09/2021 1352    CL 96 (L) 08/09/2021 1352    CO2 23 08/09/2021 1352    BUN 24 (H) 08/09/2021 1352    CREATININE 1.64 (H) 08/09/2021 1352 Component Value Date/Time    CALCIUM 9.4 08/09/2021 1352    ALKPHOS 97 07/30/2021 1108    AST 22 07/30/2021 1108    ALT 10 07/30/2021 1108    BILITOT 0.42 07/30/2021 1108        PATHOLOGY DATA:   Reviewed   IMAGING DATA:    Reviewed   ASSESSMENT:    Yolande Bailey is a 81 y.o. female with history of GERD, chronic kidney disease with mild anemia with hemoglobin around 10.1. Her iron studies last year were within normal limits. She has been taking iron pill once daily for past few months. I reviewed her laboratory data, imaging studies, prior records and discussed the diagnosis and treatment recommendations. Her anemia most likely secondary to her renal disease however with her age underlying bone marrow pathology cannot be ruled out. I will order myeloma work-up, peripheral blood smear and other anemia work-up. I will see her in 4 weeks and discuss further recommendations. During today's visit, the patient and the family had a number of reasonable questions which were answered to their satisfaction. They verbalized understanding of the information provided and they agreed to proceed as outlined above. PLAN:   Lab work today  Continue iron pill once daily  Return to clinic in 4 weeks for further recommendations      Oscar Hamilton MD  Hematologist/Medical Oncologist    On this date 10/7/21  I have spent 60 minutes reviewing previous notes, test results and face to face with the patient discussing the diagnosis and importance of compliance with the treatment plan. Greater than 50% of that time was spent face-to-face with the patient in counseling and coordinating her care. This note is created with the assistance of a speech recognition program.  While intending to generate a document that actually reflects the content of the visit, the document can still have some errors including those of syntax and sound a like substitutions which may escape proof reading.   It such instances, actual

## 2021-10-08 LAB
PATHOLOGIST REVIEW: NORMAL
SURGICAL PATHOLOGY REPORT: NORMAL

## 2021-10-09 LAB
ALBUMIN (CALCULATED): 4.6 G/DL (ref 3.2–5.2)
ALBUMIN PERCENT: 68 % (ref 45–65)
ALPHA 1 PERCENT: 3 % (ref 3–6)
ALPHA 2 PERCENT: 11 % (ref 6–13)
ALPHA-1-GLOBULIN: 0.2 G/DL (ref 0.1–0.4)
ALPHA-2-GLOBULIN: 0.7 G/DL (ref 0.5–0.9)
BETA GLOBULIN: 0.6 G/DL (ref 0.5–1.1)
BETA PERCENT: 9 % (ref 11–19)
ERYTHROPOIETIN: 12 MU/ML (ref 4–27)
GAMMA GLOBULIN %: 11 % (ref 9–20)
GAMMA GLOBULIN: 0.7 G/DL (ref 0.5–1.5)
PATHOLOGIST: ABNORMAL
PATHOLOGIST: NORMAL
PROTEIN ELECTROPHORESIS, SERUM: ABNORMAL
SERUM IFX INTERP: NORMAL
TOTAL PROT. SUM,%: 102 % (ref 98–102)
TOTAL PROT. SUM: 6.8 G/DL (ref 6.3–8.2)
TOTAL PROTEIN: 6.8 G/DL (ref 6.4–8.3)

## 2021-10-17 NOTE — RESULT ENCOUNTER NOTE
Management per hematologist oncologist, negative for multiple myeloma, normal erythropoietin level    Future Appointments  11/4/2021  1:15 PM    Kimberly Godfrey MD         [facility]           [facility]  2/9/2022   2:45 PM    Elisa King MD     [facility]               [address] [facility]

## 2022-05-12 DIAGNOSIS — N18.31 ANEMIA DUE TO STAGE 3A CHRONIC KIDNEY DISEASE (HCC): ICD-10-CM

## 2022-05-12 DIAGNOSIS — D63.1 ANEMIA DUE TO STAGE 3A CHRONIC KIDNEY DISEASE (HCC): ICD-10-CM

## 2022-05-12 DIAGNOSIS — D58.2 SIGNIFICANT DROP IN HEMOGLOBIN (HCC): ICD-10-CM

## 2022-05-12 RX ORDER — IRON POLYSACCHARIDE COMPLEX 150 MG
150 CAPSULE ORAL DAILY
Qty: 180 CAPSULE | Refills: 3 | Status: SHIPPED | OUTPATIENT
Start: 2022-05-12

## 2022-11-03 DIAGNOSIS — M1A.0721 IDIOPATHIC CHRONIC GOUT OF LEFT FOOT WITH TOPHUS: ICD-10-CM

## 2022-11-03 DIAGNOSIS — I12.9 BENIGN HYPERTENSION WITH CKD (CHRONIC KIDNEY DISEASE) STAGE III (HCC): ICD-10-CM

## 2022-11-03 DIAGNOSIS — E78.5 HYPERLIPIDEMIA WITH TARGET LDL LESS THAN 100: ICD-10-CM

## 2022-11-03 DIAGNOSIS — N18.30 BENIGN HYPERTENSION WITH CKD (CHRONIC KIDNEY DISEASE) STAGE III (HCC): ICD-10-CM

## 2022-11-04 RX ORDER — ALLOPURINOL 100 MG/1
TABLET ORAL
Qty: 90 TABLET | Refills: 3 | Status: SHIPPED | OUTPATIENT
Start: 2022-11-04

## 2022-11-04 RX ORDER — CLONIDINE HYDROCHLORIDE 0.2 MG/1
TABLET ORAL
Qty: 180 TABLET | Refills: 3 | OUTPATIENT
Start: 2022-11-04

## 2022-11-04 RX ORDER — FUROSEMIDE 20 MG/1
TABLET ORAL
Qty: 90 TABLET | Refills: 3 | OUTPATIENT
Start: 2022-11-04

## 2022-11-04 RX ORDER — HYDRALAZINE HYDROCHLORIDE 25 MG/1
TABLET, FILM COATED ORAL
Qty: 270 TABLET | Refills: 3 | OUTPATIENT
Start: 2022-11-04

## 2022-11-04 RX ORDER — ATORVASTATIN CALCIUM 20 MG/1
TABLET, FILM COATED ORAL
Qty: 90 TABLET | Refills: 3 | OUTPATIENT
Start: 2022-11-04

## 2022-11-04 RX ORDER — OMEPRAZOLE 20 MG/1
CAPSULE, DELAYED RELEASE ORAL
Qty: 90 CAPSULE | Refills: 3 | OUTPATIENT
Start: 2022-11-04

## 2022-11-04 NOTE — TELEPHONE ENCOUNTER
Failed refill due to no labs, she was seen more than 1 year ago  Also please make her an appointment for Medicare

## 2022-11-07 NOTE — TELEPHONE ENCOUNTER
Called patient to schedule routine follow up appointment with   . Patient did not answer so left a detail message. For patient to call office back to schedule follow up appointment as requested. No future appointments.

## 2023-06-06 ENCOUNTER — OFFICE VISIT (OUTPATIENT)
Dept: FAMILY MEDICINE CLINIC | Age: 88
End: 2023-06-06
Payer: MEDICARE

## 2023-06-06 VITALS
OXYGEN SATURATION: 96 % | TEMPERATURE: 97.4 F | SYSTOLIC BLOOD PRESSURE: 120 MMHG | BODY MASS INDEX: 23.9 KG/M2 | WEIGHT: 126.6 LBS | HEART RATE: 73 BPM | DIASTOLIC BLOOD PRESSURE: 76 MMHG | HEIGHT: 61 IN

## 2023-06-06 DIAGNOSIS — M1A.0721 IDIOPATHIC CHRONIC GOUT OF LEFT FOOT WITH TOPHUS: ICD-10-CM

## 2023-06-06 DIAGNOSIS — D64.9 ANEMIA, UNSPECIFIED TYPE: ICD-10-CM

## 2023-06-06 DIAGNOSIS — R53.82 CHRONIC FATIGUE: ICD-10-CM

## 2023-06-06 DIAGNOSIS — E87.1 HYPONATREMIA: ICD-10-CM

## 2023-06-06 DIAGNOSIS — I12.9 BENIGN HYPERTENSION WITH CHRONIC KIDNEY DISEASE, STAGE IV (HCC): Primary | ICD-10-CM

## 2023-06-06 DIAGNOSIS — N18.4 BENIGN HYPERTENSION WITH CHRONIC KIDNEY DISEASE, STAGE IV (HCC): Primary | ICD-10-CM

## 2023-06-06 DIAGNOSIS — N25.81 SECONDARY HYPERPARATHYROIDISM (HCC): ICD-10-CM

## 2023-06-06 DIAGNOSIS — Z13.29 SCREENING FOR THYROID DISORDER: ICD-10-CM

## 2023-06-06 DIAGNOSIS — E78.5 HYPERLIPIDEMIA WITH TARGET LDL LESS THAN 100: ICD-10-CM

## 2023-06-06 DIAGNOSIS — K21.9 GASTROESOPHAGEAL REFLUX DISEASE WITHOUT ESOPHAGITIS: ICD-10-CM

## 2023-06-06 PROCEDURE — 1123F ACP DISCUSS/DSCN MKR DOCD: CPT | Performed by: INTERNAL MEDICINE

## 2023-06-06 PROCEDURE — 1036F TOBACCO NON-USER: CPT | Performed by: INTERNAL MEDICINE

## 2023-06-06 PROCEDURE — 1090F PRES/ABSN URINE INCON ASSESS: CPT | Performed by: INTERNAL MEDICINE

## 2023-06-06 PROCEDURE — G8427 DOCREV CUR MEDS BY ELIG CLIN: HCPCS | Performed by: INTERNAL MEDICINE

## 2023-06-06 PROCEDURE — 99215 OFFICE O/P EST HI 40 MIN: CPT | Performed by: INTERNAL MEDICINE

## 2023-06-06 PROCEDURE — G8420 CALC BMI NORM PARAMETERS: HCPCS | Performed by: INTERNAL MEDICINE

## 2023-06-06 RX ORDER — NICOTINE POLACRILEX 4 MG/1
20 GUM, CHEWING ORAL
Qty: 90 TABLET | Refills: 1 | Status: SHIPPED | OUTPATIENT
Start: 2023-06-06

## 2023-06-06 RX ORDER — FUROSEMIDE 20 MG/1
20 TABLET ORAL DAILY
Qty: 90 TABLET | Refills: 1 | Status: SHIPPED | OUTPATIENT
Start: 2023-06-06

## 2023-06-06 RX ORDER — HYDRALAZINE HYDROCHLORIDE 25 MG/1
TABLET, FILM COATED ORAL
Qty: 270 TABLET | Refills: 1 | Status: SHIPPED | OUTPATIENT
Start: 2023-06-06

## 2023-06-06 RX ORDER — ALLOPURINOL 100 MG/1
100 TABLET ORAL DAILY
Qty: 90 TABLET | Refills: 1 | Status: SHIPPED | OUTPATIENT
Start: 2023-06-06

## 2023-06-06 RX ORDER — ATORVASTATIN CALCIUM 20 MG/1
20 TABLET, FILM COATED ORAL NIGHTLY
Qty: 90 TABLET | Refills: 1 | Status: SHIPPED | OUTPATIENT
Start: 2023-06-06

## 2023-06-06 RX ORDER — CLONIDINE HYDROCHLORIDE 0.2 MG/1
0.2 TABLET ORAL 2 TIMES DAILY
Qty: 180 TABLET | Refills: 1 | Status: SHIPPED | OUTPATIENT
Start: 2023-06-06

## 2023-06-06 RX ORDER — SODIUM CHLORIDE 1 G/1
1 TABLET ORAL 3 TIMES DAILY
Qty: 270 TABLET | Refills: 3 | Status: CANCELLED | OUTPATIENT
Start: 2023-06-06

## 2023-06-06 SDOH — ECONOMIC STABILITY: HOUSING INSECURITY
IN THE LAST 12 MONTHS, WAS THERE A TIME WHEN YOU DID NOT HAVE A STEADY PLACE TO SLEEP OR SLEPT IN A SHELTER (INCLUDING NOW)?: NO

## 2023-06-06 SDOH — ECONOMIC STABILITY: INCOME INSECURITY: HOW HARD IS IT FOR YOU TO PAY FOR THE VERY BASICS LIKE FOOD, HOUSING, MEDICAL CARE, AND HEATING?: NOT HARD AT ALL

## 2023-06-06 SDOH — ECONOMIC STABILITY: FOOD INSECURITY: WITHIN THE PAST 12 MONTHS, THE FOOD YOU BOUGHT JUST DIDN'T LAST AND YOU DIDN'T HAVE MONEY TO GET MORE.: NEVER TRUE

## 2023-06-06 SDOH — ECONOMIC STABILITY: FOOD INSECURITY: WITHIN THE PAST 12 MONTHS, YOU WORRIED THAT YOUR FOOD WOULD RUN OUT BEFORE YOU GOT MONEY TO BUY MORE.: NEVER TRUE

## 2023-06-06 ASSESSMENT — PATIENT HEALTH QUESTIONNAIRE - PHQ9
SUM OF ALL RESPONSES TO PHQ QUESTIONS 1-9: 0
SUM OF ALL RESPONSES TO PHQ9 QUESTIONS 1 & 2: 0
1. LITTLE INTEREST OR PLEASURE IN DOING THINGS: 0
2. FEELING DOWN, DEPRESSED OR HOPELESS: 0
SUM OF ALL RESPONSES TO PHQ QUESTIONS 1-9: 0

## 2023-06-06 ASSESSMENT — ENCOUNTER SYMPTOMS
CHEST TIGHTNESS: 0
SHORTNESS OF BREATH: 0
CONSTIPATION: 0
CHOKING: 0
NAUSEA: 0
COUGH: 0
WHEEZING: 0
ANAL BLEEDING: 0
BLOOD IN STOOL: 0
VOMITING: 0
DIARRHEA: 0
ABDOMINAL PAIN: 0

## 2023-06-06 ASSESSMENT — VISUAL ACUITY: OU: 1

## 2023-06-06 NOTE — PATIENT INSTRUCTIONS
Your labs have been ordered today as fasting labs - this means you will need to fast overnight for at least 8 hours before you come in to get the blood drawn. You may have water, black tea or coffee without sugar or creamer prior to coming in for labs. Your lab results will be released to your [facility] account as they are resulted by the lab. I will send you a message regarding your results once I have had a chance to review them, usually within a couple of days, so if you have not heard from me it means I'm either waiting for some results, or that I have not had a moment to review the results.      We will restart salt pills based on lab results when they are available   Please follow-up with your kidney doctor Dr. Emma Musa - call [phone] to make an appointment with him

## 2023-06-06 NOTE — PROGRESS NOTES
Pt is here to establish care     Pt rusty her biggest problem is feeling tired all the time, sleep is broken up sometimes, states she just has no energy
Heart sounds: Normal heart sounds, S1 normal and S2 normal. No murmur heard. No friction rub. No gallop. Pulmonary:      Effort: Pulmonary effort is normal. No respiratory distress. Breath sounds: Normal breath sounds. No wheezing. Abdominal:      General: Bowel sounds are normal.      Palpations: Abdomen is soft. There is no mass. Tenderness: There is no abdominal tenderness. There is no guarding. Musculoskeletal:         General: Normal range of motion. Skin:     General: Skin is warm and dry. Capillary Refill: Capillary refill takes less than 2 seconds. Neurological:      General: No focal deficit present. Mental Status: She is alert and oriented to person, place, and time. Data Review   Nephrology, oncology notes reviewed in chart   Onc labs reviewed in chart   Previous PCP notes reviewed in chart       Health Maintenance Due   Topic Date Due    DTaP/Tdap/Td vaccine (1 - Tdap) Never done    Shingles vaccine (1 of 2) Never done    COVID-19 Vaccine (4 - Booster for Madden Peter series) 12/01/2021    Lipids  03/17/2022    Annual Wellness Visit (AWV)  10/06/2022           Patient given educational materials- see patient instructions. Discussed use, benefit, and side effects of prescribedmedications. All patient questions answered. Pt voiced understanding. Reviewedhealth maintenance. Instructed to continue current medications, diet and exercise. Patient agreed with treatment plan. Follow up as directed.      Electronically signedby Shital Reyez MD on 6/6/2023

## 2023-09-06 ENCOUNTER — HOSPITAL ENCOUNTER (OUTPATIENT)
Age: 88
Discharge: HOME | End: 2023-09-06
Payer: MEDICARE

## 2023-09-06 DIAGNOSIS — I12.9 BENIGN HYPERTENSION WITH CHRONIC KIDNEY DISEASE, STAGE IV (HCC): ICD-10-CM

## 2023-09-06 DIAGNOSIS — E78.5 HYPERLIPIDEMIA WITH TARGET LDL LESS THAN 100: ICD-10-CM

## 2023-09-06 DIAGNOSIS — E87.1 HYPONATREMIA: ICD-10-CM

## 2023-09-06 DIAGNOSIS — D64.9 ANEMIA, UNSPECIFIED TYPE: ICD-10-CM

## 2023-09-06 DIAGNOSIS — Z13.29 SCREENING FOR THYROID DISORDER: ICD-10-CM

## 2023-09-06 DIAGNOSIS — N18.4 BENIGN HYPERTENSION WITH CHRONIC KIDNEY DISEASE, STAGE IV (HCC): ICD-10-CM

## 2023-09-06 LAB
ALBUMIN SERPL-MCNC: 4 G/DL (ref 3.5–5.2)
ALP SERPL-CCNC: 117 U/L (ref 35–104)
ALT SERPL-CCNC: 8 U/L (ref 5–33)
ANION GAP SERPL CALCULATED.3IONS-SCNC: 13 MMOL/L (ref 9–17)
AST SERPL-CCNC: 27 U/L
BASOPHILS # BLD: 0.1 K/UL (ref 0–0.2)
BASOPHILS NFR BLD: 1 % (ref 0–2)
BILIRUB SERPL-MCNC: 1.1 MG/DL (ref 0.3–1.2)
BUN SERPL-MCNC: 26 MG/DL (ref 8–23)
CALCIUM SERPL-MCNC: 9.5 MG/DL (ref 8.6–10.4)
CHLORIDE SERPL-SCNC: 105 MMOL/L (ref 98–107)
CHOLEST SERPL-MCNC: 132 MG/DL
CHOLESTEROL/HDL RATIO: 1.7
CO2 SERPL-SCNC: 28 MMOL/L (ref 20–31)
CREAT SERPL-MCNC: 1.7 MG/DL (ref 0.5–0.9)
EOSINOPHIL # BLD: 0.4 K/UL (ref 0–0.4)
EOSINOPHILS RELATIVE PERCENT: 6 % (ref 0–4)
ERYTHROCYTE [DISTWIDTH] IN BLOOD BY AUTOMATED COUNT: 19.1 % (ref 11.5–14.9)
FERRITIN SERPL-MCNC: 274 NG/ML (ref 13–150)
FOLATE SERPL-MCNC: 14.7 NG/ML
GFR SERPL CREATININE-BSD FRML MDRD: 29 ML/MIN/1.73M2
GLUCOSE SERPL-MCNC: 94 MG/DL (ref 70–99)
HCT VFR BLD AUTO: 33.2 % (ref 36–46)
HDLC SERPL-MCNC: 78 MG/DL
HGB BLD-MCNC: 10.6 G/DL (ref 12–16)
IRON SATN MFR SERPL: 45 % (ref 20–55)
IRON SERPL-MCNC: 95 UG/DL (ref 37–145)
LDLC SERPL CALC-MCNC: 41 MG/DL (ref 0–130)
LYMPHOCYTES NFR BLD: 2.1 K/UL (ref 1–4.8)
LYMPHOCYTES RELATIVE PERCENT: 33 % (ref 24–44)
MCH RBC QN AUTO: 32.1 PG (ref 26–34)
MCHC RBC AUTO-ENTMCNC: 31.8 G/DL (ref 31–37)
MCV RBC AUTO: 100.8 FL (ref 80–100)
MONOCYTES NFR BLD: 0.5 K/UL (ref 0.1–1.3)
MONOCYTES NFR BLD: 9 % (ref 1–7)
NEUTROPHILS NFR BLD: 51 % (ref 36–66)
NEUTS SEG NFR BLD: 3.3 K/UL (ref 1.3–9.1)
PLATELET # BLD AUTO: 244 K/UL (ref 150–450)
PMV BLD AUTO: 8.3 FL (ref 6–12)
POTASSIUM SERPL-SCNC: 3.1 MMOL/L (ref 3.7–5.3)
PROT SERPL-MCNC: 6.6 G/DL (ref 6.4–8.3)
RBC # BLD AUTO: 3.29 M/UL (ref 4–5.2)
SODIUM SERPL-SCNC: 146 MMOL/L (ref 135–144)
TIBC SERPL-MCNC: 209 UG/DL (ref 250–450)
TRIGL SERPL-MCNC: 67 MG/DL
TSH SERPL DL<=0.05 MIU/L-ACNC: 4.97 UIU/ML (ref 0.3–5)
UNSATURATED IRON BINDING CAPACITY: 114 UG/DL (ref 112–347)
VIT B12 SERPL-MCNC: 450 PG/ML (ref 232–1245)
WBC OTHER # BLD: 6.4 K/UL (ref 3.5–11)

## 2023-09-06 PROCEDURE — 84443 ASSAY THYROID STIM HORMONE: CPT

## 2023-09-06 PROCEDURE — 80053 COMPREHEN METABOLIC PANEL: CPT

## 2023-09-06 PROCEDURE — 85025 COMPLETE CBC W/AUTO DIFF WBC: CPT

## 2023-09-06 PROCEDURE — 36415 COLL VENOUS BLD VENIPUNCTURE: CPT

## 2023-09-06 PROCEDURE — 83550 IRON BINDING TEST: CPT

## 2023-09-06 PROCEDURE — 80061 LIPID PANEL: CPT

## 2023-09-06 PROCEDURE — 82728 ASSAY OF FERRITIN: CPT

## 2023-09-06 PROCEDURE — 82607 VITAMIN B-12: CPT

## 2023-09-06 PROCEDURE — 82746 ASSAY OF FOLIC ACID SERUM: CPT

## 2023-09-06 PROCEDURE — 83540 ASSAY OF IRON: CPT

## 2023-09-07 ENCOUNTER — OFFICE VISIT (OUTPATIENT)
Dept: FAMILY MEDICINE CLINIC | Age: 88
End: 2023-09-07
Payer: MEDICARE

## 2023-09-07 VITALS
BODY MASS INDEX: 24.75 KG/M2 | SYSTOLIC BLOOD PRESSURE: 132 MMHG | OXYGEN SATURATION: 99 % | HEART RATE: 87 BPM | TEMPERATURE: 98 F | WEIGHT: 131 LBS | DIASTOLIC BLOOD PRESSURE: 86 MMHG

## 2023-09-07 DIAGNOSIS — M1A.0721 IDIOPATHIC CHRONIC GOUT OF LEFT FOOT WITH TOPHUS: ICD-10-CM

## 2023-09-07 DIAGNOSIS — Z23 NEED FOR IMMUNIZATION AGAINST INFLUENZA: ICD-10-CM

## 2023-09-07 DIAGNOSIS — N18.4 ANEMIA DUE TO STAGE 4 CHRONIC KIDNEY DISEASE (HCC): ICD-10-CM

## 2023-09-07 DIAGNOSIS — N18.4 BENIGN HYPERTENSION WITH CHRONIC KIDNEY DISEASE, STAGE IV (HCC): Primary | ICD-10-CM

## 2023-09-07 DIAGNOSIS — D63.1 ANEMIA DUE TO STAGE 3A CHRONIC KIDNEY DISEASE (HCC): ICD-10-CM

## 2023-09-07 DIAGNOSIS — G47.9 SLEEPING DIFFICULTY: ICD-10-CM

## 2023-09-07 DIAGNOSIS — D63.1 ANEMIA DUE TO STAGE 4 CHRONIC KIDNEY DISEASE (HCC): ICD-10-CM

## 2023-09-07 DIAGNOSIS — I12.9 BENIGN HYPERTENSION WITH CHRONIC KIDNEY DISEASE, STAGE IV (HCC): Primary | ICD-10-CM

## 2023-09-07 DIAGNOSIS — E78.5 HYPERLIPIDEMIA WITH TARGET LDL LESS THAN 100: ICD-10-CM

## 2023-09-07 DIAGNOSIS — E83.42 HYPOMAGNESEMIA: ICD-10-CM

## 2023-09-07 DIAGNOSIS — E87.1 HYPONATREMIA: ICD-10-CM

## 2023-09-07 DIAGNOSIS — Z23 NEED FOR SHINGLES VACCINE: ICD-10-CM

## 2023-09-07 DIAGNOSIS — K21.9 GASTROESOPHAGEAL REFLUX DISEASE WITHOUT ESOPHAGITIS: ICD-10-CM

## 2023-09-07 DIAGNOSIS — N18.31 ANEMIA DUE TO STAGE 3A CHRONIC KIDNEY DISEASE (HCC): ICD-10-CM

## 2023-09-07 PROCEDURE — 99214 OFFICE O/P EST MOD 30 MIN: CPT | Performed by: INTERNAL MEDICINE

## 2023-09-07 PROCEDURE — 1123F ACP DISCUSS/DSCN MKR DOCD: CPT | Performed by: INTERNAL MEDICINE

## 2023-09-07 PROCEDURE — 90694 VACC AIIV4 NO PRSRV 0.5ML IM: CPT | Performed by: INTERNAL MEDICINE

## 2023-09-07 PROCEDURE — 1090F PRES/ABSN URINE INCON ASSESS: CPT | Performed by: INTERNAL MEDICINE

## 2023-09-07 PROCEDURE — G8427 DOCREV CUR MEDS BY ELIG CLIN: HCPCS | Performed by: INTERNAL MEDICINE

## 2023-09-07 PROCEDURE — 1036F TOBACCO NON-USER: CPT | Performed by: INTERNAL MEDICINE

## 2023-09-07 PROCEDURE — G8420 CALC BMI NORM PARAMETERS: HCPCS | Performed by: INTERNAL MEDICINE

## 2023-09-07 PROCEDURE — G0008 ADMIN INFLUENZA VIRUS VAC: HCPCS | Performed by: INTERNAL MEDICINE

## 2023-09-07 RX ORDER — HYDRALAZINE HYDROCHLORIDE 25 MG/1
TABLET, FILM COATED ORAL
Qty: 270 TABLET | Refills: 1 | Status: SHIPPED | OUTPATIENT
Start: 2023-09-07

## 2023-09-07 RX ORDER — SODIUM CHLORIDE 1 G/1
1 TABLET ORAL 3 TIMES DAILY
Qty: 270 TABLET | Refills: 1 | Status: SHIPPED | OUTPATIENT
Start: 2023-09-07 | End: 2023-09-21 | Stop reason: ALTCHOICE

## 2023-09-07 RX ORDER — ZOSTER VACCINE RECOMBINANT, ADJUVANTED 50 MCG/0.5
0.5 KIT INTRAMUSCULAR SEE ADMIN INSTRUCTIONS
Qty: 0.5 ML | Refills: 0 | Status: SHIPPED | OUTPATIENT
Start: 2023-09-07 | End: 2023-09-08

## 2023-09-07 RX ORDER — ALLOPURINOL 100 MG/1
100 TABLET ORAL DAILY
Qty: 90 TABLET | Refills: 1 | Status: SHIPPED | OUTPATIENT
Start: 2023-09-07

## 2023-09-07 RX ORDER — ATORVASTATIN CALCIUM 20 MG/1
20 TABLET, FILM COATED ORAL NIGHTLY
Qty: 90 TABLET | Refills: 1 | Status: SHIPPED | OUTPATIENT
Start: 2023-09-07

## 2023-09-07 RX ORDER — IRON POLYSACCHARIDE COMPLEX 150 MG
150 CAPSULE ORAL DAILY
Qty: 180 CAPSULE | Refills: 1 | Status: SHIPPED | OUTPATIENT
Start: 2023-09-07

## 2023-09-07 RX ORDER — NICOTINE POLACRILEX 4 MG/1
20 GUM, CHEWING ORAL
Qty: 90 TABLET | Refills: 1 | Status: SHIPPED | OUTPATIENT
Start: 2023-09-07

## 2023-09-07 RX ORDER — FUROSEMIDE 20 MG/1
20 TABLET ORAL DAILY
Qty: 90 TABLET | Refills: 1 | Status: SHIPPED | OUTPATIENT
Start: 2023-09-07

## 2023-09-07 NOTE — PROGRESS NOTES
Pt I here today for a regular 3 mo f/u    Pt has not been able to sleep for the last month or so, pt does not feel like doing anything, going to the grocery store has become a challenge, pt has no energy, no motivation

## 2023-09-07 NOTE — PROGRESS NOTES
[facility]  [facility] [place] [address]  [address] [address]  Dept: [phone]  Dept Fax: [phone]      Sharron Carlin is a 81 y.o. female who presents today for hermedical conditions/complaints as noted below. Sharron Carlin is c/o of Gastroesophageal Reflux (F/u) and Hyperlipidemia (F/u)        Assessment/Plan:     1. Benign hypertension with chronic kidney disease, stage IV (HCC)  -     furosemide (LASIX) 20 MG tablet; Take 1 tablet by mouth daily, Disp-90 tablet, R-1Normal  -     hydrALAZINE (APRESOLINE) 25 MG tablet; TAKE 1 TABLET THREE TIMES A DAY, Disp-270 tablet, R-1Normal  2. 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 Gastroesophageal reflux disease without esophagitis  -     omeprazole 20 MG EC tablet; Take 1 tablet by mouth every morning (before breakfast), Disp-90 tablet, R-1Normal  4. Hyperlipidemia with target LDL less than 100  -     atorvastatin (LIPITOR) 20 MG tablet; Take 1 tablet by mouth nightly, Disp-90 tablet, R-1Normal  5. Anemia due to stage 3a chronic kidney disease (HCC)  -     iron polysaccharides (NIFEREX) 150 MG capsule; Take 1 capsule by mouth daily, Disp-180 capsule, R-1Normal  6. Anemia due to stage 4 chronic kidney disease (HCC)  -     iron polysaccharides (NIFEREX) 150 MG capsule; Take 1 capsule by mouth daily, Disp-180 capsule, R-1Normal  7. Hypomagnesemia  -     Magnesium Oxide (MAGNESIUM-OXIDE) 250 MG TABS tablet; Take 1 tablet by mouth 2 times daily, Disp-180 tablet, R-1Normal  8. Hyponatremia  -     sodium chloride 1 g tablet; Take 1 tablet by mouth 3 times daily, Disp-270 tablet, R-1Normal  9. Sleeping difficulty  10. Need for immunization against influenza  -     Influenza, FLUAD, (age 72 y+), IM, Preservative Free, 0.5 mL  11.  Need for shingles vaccine  -     zoster recombinant adjuvanted vaccine [facility]) 50

## 2023-09-07 NOTE — PATIENT INSTRUCTIONS
Call [phone] to schedule an appointment with Dr Steven Miller - kidney doctor   Call [phone] to schedule an appointment with Dr Bladimir Arrieta - blood doctor

## 2023-09-18 ASSESSMENT — ENCOUNTER SYMPTOMS
WHEEZING: 0
VOMITING: 0
SHORTNESS OF BREATH: 0
BLOOD IN STOOL: 0
ABDOMINAL PAIN: 0
CHOKING: 0
CHEST TIGHTNESS: 0
COUGH: 0
CONSTIPATION: 0
ANAL BLEEDING: 0
NAUSEA: 0
DIARRHEA: 0

## 2023-09-18 ASSESSMENT — VISUAL ACUITY: OU: 1

## 2023-10-05 ENCOUNTER — TELEPHONE (OUTPATIENT)
Dept: ONCOLOGY | Age: 88
End: 2023-10-05

## 2023-10-05 NOTE — TELEPHONE ENCOUNTER
PT WAS A NO SHOW NO CALL   Lvm for pt to return office call to reschedule appt .    Electronically signed by Mina Bell MA on 10/5/2023 at 4:11 PM

## 2023-12-11 ENCOUNTER — OFFICE VISIT (OUTPATIENT)
Dept: FAMILY MEDICINE CLINIC | Age: 88
End: 2023-12-11
Payer: MEDICARE

## 2023-12-11 VITALS
OXYGEN SATURATION: 99 % | HEIGHT: 61 IN | BODY MASS INDEX: 24.55 KG/M2 | WEIGHT: 130 LBS | HEART RATE: 98 BPM | DIASTOLIC BLOOD PRESSURE: 78 MMHG | SYSTOLIC BLOOD PRESSURE: 124 MMHG | RESPIRATION RATE: 18 BRPM

## 2023-12-11 DIAGNOSIS — M1A.0720 IDIOPATHIC CHRONIC GOUT OF LEFT FOOT WITHOUT TOPHUS: ICD-10-CM

## 2023-12-11 DIAGNOSIS — Z71.89 ACP (ADVANCE CARE PLANNING): ICD-10-CM

## 2023-12-11 DIAGNOSIS — M81.0 AGE-RELATED OSTEOPOROSIS WITHOUT CURRENT PATHOLOGICAL FRACTURE: ICD-10-CM

## 2023-12-11 DIAGNOSIS — E78.5 HYPERLIPIDEMIA WITH TARGET LDL LESS THAN 100: ICD-10-CM

## 2023-12-11 DIAGNOSIS — K21.9 GASTROESOPHAGEAL REFLUX DISEASE WITHOUT ESOPHAGITIS: ICD-10-CM

## 2023-12-11 DIAGNOSIS — R53.82 CHRONIC FATIGUE: ICD-10-CM

## 2023-12-11 DIAGNOSIS — D63.8 ANEMIA, CHRONIC DISEASE: ICD-10-CM

## 2023-12-11 DIAGNOSIS — N18.4 CKD (CHRONIC KIDNEY DISEASE) STAGE 4, GFR 15-29 ML/MIN (HCC): ICD-10-CM

## 2023-12-11 DIAGNOSIS — Z00.00 MEDICARE ANNUAL WELLNESS VISIT, SUBSEQUENT: Primary | ICD-10-CM

## 2023-12-11 PROCEDURE — G0439 PPPS, SUBSEQ VISIT: HCPCS | Performed by: INTERNAL MEDICINE

## 2023-12-11 PROCEDURE — G8484 FLU IMMUNIZE NO ADMIN: HCPCS | Performed by: INTERNAL MEDICINE

## 2023-12-11 PROCEDURE — 99497 ADVNCD CARE PLAN 30 MIN: CPT | Performed by: INTERNAL MEDICINE

## 2023-12-11 PROCEDURE — 1123F ACP DISCUSS/DSCN MKR DOCD: CPT | Performed by: INTERNAL MEDICINE

## 2023-12-11 ASSESSMENT — PATIENT HEALTH QUESTIONNAIRE - PHQ9
SUM OF ALL RESPONSES TO PHQ QUESTIONS 1-9: 0
1. LITTLE INTEREST OR PLEASURE IN DOING THINGS: 0
SUM OF ALL RESPONSES TO PHQ QUESTIONS 1-9: 0
SUM OF ALL RESPONSES TO PHQ QUESTIONS 1-9: 0
SUM OF ALL RESPONSES TO PHQ9 QUESTIONS 1 & 2: 0
2. FEELING DOWN, DEPRESSED OR HOPELESS: 0
SUM OF ALL RESPONSES TO PHQ QUESTIONS 1-9: 0

## 2023-12-11 ASSESSMENT — LIFESTYLE VARIABLES
HOW OFTEN DO YOU HAVE A DRINK CONTAINING ALCOHOL: MONTHLY OR LESS
HOW MANY STANDARD DRINKS CONTAINING ALCOHOL DO YOU HAVE ON A TYPICAL DAY: 1 OR 2

## 2024-05-02 DIAGNOSIS — N18.4 BENIGN HYPERTENSION WITH CHRONIC KIDNEY DISEASE, STAGE IV (HCC): ICD-10-CM

## 2024-05-02 DIAGNOSIS — I12.9 BENIGN HYPERTENSION WITH CHRONIC KIDNEY DISEASE, STAGE IV (HCC): ICD-10-CM

## 2024-05-02 RX ORDER — CLONIDINE HYDROCHLORIDE 0.2 MG/1
0.2 TABLET ORAL DAILY
Qty: 90 TABLET | Refills: 1 | Status: SHIPPED | OUTPATIENT
Start: 2024-05-02

## 2024-05-02 NOTE — TELEPHONE ENCOUNTER
Directions have 2 times daily and daily listed. Called son to see how she is taking this, but had to LM for a return call.      Last visit: 12/11/23  Last Med refill: 6/6/23  Does patient have enough medication for 72 hours: No:    Next Visit Date:  Future Appointments   Date Time Provider Department Center   6/11/2024  2:30 PM Katerin Schmidt MD [facility]       Health Maintenance   Topic Date Due    DTaP/Tdap/Td vaccine (1 - Tdap) Never done    Respiratory Syncytial Virus (RSV) Pregnant or age 60 yrs+ (1 - 1-dose 60+ series) Never done    COVID-19 Vaccine (4 - 2023-24 season) 09/01/2023    Shingles vaccine (2 of 2) 11/16/2023    Lipids  09/06/2024    Depression Screen  12/11/2024    Annual Wellness Visit (Medicare)  12/11/2024    Flu vaccine  Completed    Pneumococcal 65+ years Vaccine  Completed    Hepatitis A vaccine  Aged Out    Hepatitis B vaccine  Aged Out    Hib vaccine  Aged Out    Polio vaccine  Aged Out    Meningococcal (ACWY) vaccine  Aged Out    DEXA (modify frequency per FRAX score)  Discontinued       Hemoglobin A1C (%)   Date Value   08/14/2020 4.9             ( goal A1C is < 7)   No components found for: \"LABMICR\"  No components found for: \"LDLCHOLESTEROL\", \"LDLCALC\"    (goal LDL is <100)   AST (U/L)   Date Value   09/06/2023 27     ALT (U/L)   Date Value   09/06/2023 8     BUN (mg/dL)   Date Value   09/06/2023 26 (H)     BP Readings from Last 3 Encounters:   12/11/23 124/78   09/21/23 138/78   09/07/23 132/86          (goal 120/80)    All Future Testing planned in [facility]  Lab Frequency Next Occurrence   BUN & Creatinine Once 09/21/2023   Calcium Once 09/21/2023   Electrolyte Panel Once 09/21/2023   Hemoglobin and Hematocrit Once 09/21/2023   Magnesium Once 09/21/2023   Phosphorus Once 09/21/2023   Urinalysis with Microscopic Once 09/21/2023   Vitamin D 25 Hydroxy Once 09/21/2023   Microalbumin / Creatinine Urine Ratio Once 09/21/2023   Basic Metabolic Panel Once 10/05/2023

## 2024-07-08 DIAGNOSIS — N18.4 BENIGN HYPERTENSION WITH CHRONIC KIDNEY DISEASE, STAGE IV (HCC): ICD-10-CM

## 2024-07-08 DIAGNOSIS — I12.9 BENIGN HYPERTENSION WITH CHRONIC KIDNEY DISEASE, STAGE IV (HCC): ICD-10-CM

## 2024-07-08 DIAGNOSIS — E78.5 HYPERLIPIDEMIA WITH TARGET LDL LESS THAN 100: ICD-10-CM

## 2024-07-08 DIAGNOSIS — K21.9 GASTROESOPHAGEAL REFLUX DISEASE WITHOUT ESOPHAGITIS: ICD-10-CM

## 2024-07-08 RX ORDER — FUROSEMIDE 20 MG/1
20 TABLET ORAL DAILY
Qty: 90 TABLET | Refills: 0 | Status: SHIPPED | OUTPATIENT
Start: 2024-07-08

## 2024-07-08 RX ORDER — ATORVASTATIN CALCIUM 20 MG/1
20 TABLET, FILM COATED ORAL NIGHTLY
Qty: 90 TABLET | Refills: 0 | Status: SHIPPED | OUTPATIENT
Start: 2024-07-08

## 2024-07-08 RX ORDER — CLONIDINE HYDROCHLORIDE 0.2 MG/1
0.2 TABLET ORAL DAILY
Qty: 90 TABLET | Refills: 0 | Status: SHIPPED | OUTPATIENT
Start: 2024-07-08

## 2024-07-08 RX ORDER — NICOTINE POLACRILEX 4 MG/1
20 GUM, CHEWING ORAL
Qty: 90 TABLET | Refills: 0 | Status: SHIPPED | OUTPATIENT
Start: 2024-07-08

## 2024-07-08 NOTE — TELEPHONE ENCOUNTER
Last visit: 12/11/23  Last Med refill: unknown  Does patient have enough medication for 72 hours: unknown    Next Visit Date:  Future Appointments   Date Time Provider Department Center   8/5/2024  2:30 PM [facility] [place]  [facility]       Health Maintenance   Topic Date Due    DTaP/Tdap/Td vaccine (1 - Tdap) Never done    Respiratory Syncytial Virus (RSV) Pregnant or age 60 yrs+ (1 - 1-dose 60+ series) Never done    COVID-19 Vaccine (4 - 2023-24 season) 09/01/2023    Shingles vaccine (2 of 2) 11/16/2023    Flu vaccine (1) 08/01/2024    Lipids  09/06/2024    Depression Screen  12/11/2024    Annual Wellness Visit (Medicare)  12/11/2024    Pneumococcal 65+ years Vaccine  Completed    Hepatitis A vaccine  Aged Out    Hepatitis B vaccine  Aged Out    Hib vaccine  Aged Out    Polio vaccine  Aged Out    Meningococcal (ACWY) vaccine  Aged Out    DEXA (modify frequency per FRAX score)  Discontinued       Hemoglobin A1C (%)   Date Value   08/14/2020 4.9             ( goal A1C is < 7)   No components found for: \"LABMICR\"  No components found for: \"LDLCHOLESTEROL\", \"LDLCALC\"    (goal LDL is <100)   AST (U/L)   Date Value   09/06/2023 27     ALT (U/L)   Date Value   09/06/2023 8     BUN (mg/dL)   Date Value   09/06/2023 26 (H)     BP Readings from Last 3 Encounters:   12/11/23 124/78   09/21/23 138/78   09/07/23 132/86          (goal 120/80)    All Future Testing planned in [facility]  Lab Frequency Next Occurrence   BUN & Creatinine Once 09/21/2023   Calcium Once 09/21/2023   Electrolyte Panel Once 09/21/2023   Hemoglobin and Hematocrit Once 09/21/2023   Magnesium Once 09/21/2023   Phosphorus Once 09/21/2023   Urinalysis with Microscopic Once 09/21/2023   Vitamin D 25 Hydroxy Once 09/21/2023   Microalbumin / Creatinine Urine Ratio Once 09/21/2023   Basic Metabolic Panel Once 10/05/2023               Patient Active Problem List:     Osteoporosis     Idiopathic chronic gout of left foot without tophus

## 2024-08-01 DIAGNOSIS — I12.9 BENIGN HYPERTENSION WITH CHRONIC KIDNEY DISEASE, STAGE IV (HCC): ICD-10-CM

## 2024-08-01 DIAGNOSIS — N18.4 BENIGN HYPERTENSION WITH CHRONIC KIDNEY DISEASE, STAGE IV (HCC): ICD-10-CM

## 2024-08-01 RX ORDER — HYDRALAZINE HYDROCHLORIDE 25 MG/1
TABLET, FILM COATED ORAL
Qty: 270 TABLET | Refills: 1 | Status: SHIPPED | OUTPATIENT
Start: 2024-08-01

## 2024-08-01 NOTE — TELEPHONE ENCOUNTER
Last visit: 12/11/2023  Last Med refill: 09/07/2023-90 day 1 refill  Does patient have enough medication for 72 hours: No:     Next Visit Date:  Future Appointments   Date Time Provider Department Center   8/5/2024  2:30 PM Katerin Schmidt MD [place]       Health Maintenance   Topic Date Due    DTaP/Tdap/Td vaccine (1 - Tdap) Never done    Respiratory Syncytial Virus (RSV) Pregnant or age 60 yrs+ (1 - 1-dose 60+ series) Never done    COVID-19 Vaccine (4 - 2023-24 season) 09/01/2023    Shingles vaccine (2 of 2) 11/16/2023    Flu vaccine (1) 08/01/2024    Lipids  09/06/2024    Depression Screen  12/11/2024    Annual Wellness Visit (Medicare)  12/11/2024    Pneumococcal 65+ years Vaccine  Completed    Hepatitis A vaccine  Aged Out    Hepatitis B vaccine  Aged Out    Hib vaccine  Aged Out    Polio vaccine  Aged Out    Meningococcal (ACWY) vaccine  Aged Out    DEXA (modify frequency per FRAX score)  Discontinued       Hemoglobin A1C (%)   Date Value   08/14/2020 4.9             ( goal A1C is < 7)   No components found for: \"LABMICR\"  No components found for: \"LDLCHOLESTEROL\", \"LDLCALC\"    (goal LDL is <100)   AST (U/L)   Date Value   09/06/2023 27     ALT (U/L)   Date Value   09/06/2023 8     BUN (mg/dL)   Date Value   09/06/2023 26 (H)     BP Readings from Last 3 Encounters:   12/11/23 124/78   09/21/23 138/78   09/07/23 132/86          (goal 120/80)    All Future Testing planned in [facility]  Lab Frequency Next Occurrence   BUN & Creatinine Once 09/21/2023   Calcium Once 09/21/2023   Electrolyte Panel Once 09/21/2023   Hemoglobin and Hematocrit Once 09/21/2023   Magnesium Once 09/21/2023   Phosphorus Once 09/21/2023   Urinalysis with Microscopic Once 09/21/2023   Vitamin D 25 Hydroxy Once 09/21/2023   Microalbumin / Creatinine Urine Ratio Once 09/21/2023   Basic Metabolic Panel Once 10/05/2023               Patient Active Problem List:     Osteoporosis     Idiopathic chronic gout of left foot

## 2024-08-05 ENCOUNTER — OFFICE VISIT (OUTPATIENT)
Dept: FAMILY MEDICINE CLINIC | Age: 89
End: 2024-08-05

## 2024-08-05 VITALS
WEIGHT: 127.8 LBS | OXYGEN SATURATION: 99 % | BODY MASS INDEX: 24.16 KG/M2 | SYSTOLIC BLOOD PRESSURE: 136 MMHG | TEMPERATURE: 97.4 F | HEART RATE: 94 BPM | DIASTOLIC BLOOD PRESSURE: 80 MMHG

## 2024-08-05 DIAGNOSIS — D63.1 ANEMIA DUE TO STAGE 4 CHRONIC KIDNEY DISEASE (HCC): ICD-10-CM

## 2024-08-05 DIAGNOSIS — N18.31 ANEMIA DUE TO STAGE 3A CHRONIC KIDNEY DISEASE (HCC): ICD-10-CM

## 2024-08-05 DIAGNOSIS — I12.9 BENIGN HYPERTENSION WITH CHRONIC KIDNEY DISEASE, STAGE IV (HCC): ICD-10-CM

## 2024-08-05 DIAGNOSIS — D63.1 ANEMIA DUE TO STAGE 3A CHRONIC KIDNEY DISEASE (HCC): ICD-10-CM

## 2024-08-05 DIAGNOSIS — N25.81 SECONDARY HYPERPARATHYROIDISM (HCC): ICD-10-CM

## 2024-08-05 DIAGNOSIS — E78.5 HYPERLIPIDEMIA WITH TARGET LDL LESS THAN 100: ICD-10-CM

## 2024-08-05 DIAGNOSIS — M1A.0721 IDIOPATHIC CHRONIC GOUT OF LEFT FOOT WITH TOPHUS: ICD-10-CM

## 2024-08-05 DIAGNOSIS — N18.4 BENIGN HYPERTENSION WITH CHRONIC KIDNEY DISEASE, STAGE IV (HCC): ICD-10-CM

## 2024-08-05 DIAGNOSIS — K21.9 GASTROESOPHAGEAL REFLUX DISEASE WITHOUT ESOPHAGITIS: ICD-10-CM

## 2024-08-05 DIAGNOSIS — E83.42 HYPOMAGNESEMIA: ICD-10-CM

## 2024-08-05 DIAGNOSIS — N18.4 ANEMIA DUE TO STAGE 4 CHRONIC KIDNEY DISEASE (HCC): ICD-10-CM

## 2024-08-05 RX ORDER — CLONIDINE HYDROCHLORIDE 0.2 MG/1
0.2 TABLET ORAL DAILY
Qty: 90 TABLET | Refills: 1 | Status: SHIPPED | OUTPATIENT
Start: 2024-08-05

## 2024-08-05 RX ORDER — ATORVASTATIN CALCIUM 20 MG/1
20 TABLET, FILM COATED ORAL NIGHTLY
Qty: 90 TABLET | Refills: 1 | Status: SHIPPED | OUTPATIENT
Start: 2024-08-05

## 2024-08-05 RX ORDER — FUROSEMIDE 20 MG/1
20 TABLET ORAL DAILY
Qty: 90 TABLET | Refills: 1 | Status: SHIPPED | OUTPATIENT
Start: 2024-08-05

## 2024-08-05 RX ORDER — NICOTINE POLACRILEX 4 MG/1
20 GUM, CHEWING ORAL
Qty: 90 TABLET | Refills: 1 | Status: SHIPPED | OUTPATIENT
Start: 2024-08-05

## 2024-08-05 RX ORDER — ALLOPURINOL 100 MG/1
100 TABLET ORAL DAILY
Qty: 90 TABLET | Refills: 1 | Status: SHIPPED | OUTPATIENT
Start: 2024-08-05

## 2024-08-05 RX ORDER — IRON POLYSACCHARIDE COMPLEX 150 MG
150 CAPSULE ORAL DAILY
Qty: 180 CAPSULE | Refills: 1 | Status: SHIPPED | OUTPATIENT
Start: 2024-08-05

## 2024-08-05 SDOH — ECONOMIC STABILITY: FOOD INSECURITY: WITHIN THE PAST 12 MONTHS, THE FOOD YOU BOUGHT JUST DIDN'T LAST AND YOU DIDN'T HAVE MONEY TO GET MORE.: NEVER TRUE

## 2024-08-05 SDOH — ECONOMIC STABILITY: FOOD INSECURITY: WITHIN THE PAST 12 MONTHS, YOU WORRIED THAT YOUR FOOD WOULD RUN OUT BEFORE YOU GOT MONEY TO BUY MORE.: NEVER TRUE

## 2024-08-05 SDOH — ECONOMIC STABILITY: INCOME INSECURITY: HOW HARD IS IT FOR YOU TO PAY FOR THE VERY BASICS LIKE FOOD, HOUSING, MEDICAL CARE, AND HEATING?: NOT HARD AT ALL

## 2024-08-05 ASSESSMENT — PATIENT HEALTH QUESTIONNAIRE - PHQ9
SUM OF ALL RESPONSES TO PHQ QUESTIONS 1-9: 0
SUM OF ALL RESPONSES TO PHQ QUESTIONS 1-9: 0
2. FEELING DOWN, DEPRESSED OR HOPELESS: NOT AT ALL
1. LITTLE INTEREST OR PLEASURE IN DOING THINGS: NOT AT ALL
SUM OF ALL RESPONSES TO PHQ9 QUESTIONS 1 & 2: 0
SUM OF ALL RESPONSES TO PHQ QUESTIONS 1-9: 0
SUM OF ALL RESPONSES TO PHQ QUESTIONS 1-9: 0

## 2024-08-05 ASSESSMENT — ENCOUNTER SYMPTOMS
WHEEZING: 0
VOMITING: 0
BLOOD IN STOOL: 0
CHEST TIGHTNESS: 0
ANAL BLEEDING: 0
CHOKING: 0
SHORTNESS OF BREATH: 0
COUGH: 0
ABDOMINAL PAIN: 0
NAUSEA: 0
DIARRHEA: 0
CONSTIPATION: 0

## 2024-08-05 ASSESSMENT — VISUAL ACUITY: OU: 1

## 2024-08-05 NOTE — PATIENT INSTRUCTIONS
Call and schedule your follow-up with your kidney doctor   There are lab orders in your chart for you to complete before you see the kidney doctor so that you can discuss results and a management plan for your kidneys

## 2024-08-05 NOTE — PROGRESS NOTES
[facility]  [place] [address]  Dept: [phone]  Dept Fax: [phone]      Anita Morfin is a 88 y.o. female who presents today for hermedical conditions/complaints as noted below.  Anita Morfin is c/o of Gastroesophageal Reflux (F/u), Hypertension (F/u), and Back Pain (Pt states she has been having some back pain )        Assessment/Plan:     1. 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 Hyperlipidemia with target LDL less than 100  -     atorvastatin (LIPITOR) 20 MG tablet; Take 1 tablet by mouth nightly, Disp-90 tablet, R-1Normal  -     Lipid, Fasting; Future  3. Benign hypertension with chronic kidney disease, stage IV (HCC)  -     cloNIDine (CATAPRES) 0.2 MG tablet; Take 1 tablet by mouth daily, Disp-90 tablet, R-1Normal  -     furosemide (LASIX) 20 MG tablet; Take 1 tablet by mouth daily, Disp-90 tablet, R-1Normal  4. Anemia due to stage 3a chronic kidney disease (HCC)  -     iron polysaccharides (NIFEREX) 150 MG capsule; Take 1 capsule by mouth daily, Disp-180 capsule, R-1Normal  5. Anemia due to stage 4 chronic kidney disease (HCC)  -     iron polysaccharides (NIFEREX) 150 MG capsule; Take 1 capsule by mouth daily, Disp-180 capsule, R-1Normal  6. Hypomagnesemia  -     Magnesium Oxide (MAGNESIUM-OXIDE) 250 MG TABS tablet; Take 1 tablet by mouth 2 times daily, Disp-180 tablet, R-1Normal  7. Gastroesophageal reflux disease without esophagitis  -     omeprazole 20 MG EC tablet; Take 1 tablet by mouth every morning (before breakfast), Disp-90 tablet, R-1Normal  8. Secondary hyperparathyroidism (HCC)          No follow-ups on file.      HPI     Hypertension-tolerating current regimen without chest pain, palpitations, dizziness, peripheral edema, dyspnea on exertion, orthopnea, paroxysmal nocturnal

## 2024-12-12 NOTE — TELEPHONE ENCOUNTER
[facility] Initial Follow Up Call    Outreach made within 2 business days of discharge: Yes    Patient: Bernabe Velasquez Patient : 1935   MRN: [id number]  Reason for Admission: There are no discharge diagnoses documented for the most recent discharge. Discharge Date: 3/27/21       Spoke with Shasha Metcalf    Discharge department/facility: [place] [address] Interactive Patient Contact:  Was patient able to fill all prescriptions: Yes  Was patient instructed to bring all medications to the follow-up visit: Yes  Is patient taking all medications as directed in the discharge summary?  Yes  Does patient understand their discharge instructions: Yes  Does patient have questions or concerns that need addressed prior to 7-14 day follow up office visit: no    Scheduled appointment with PCP within 7-14 days    Follow Up  Future Appointments   Date Time Provider Eric Vizcaino   2021  1:30 PM [facility]   2021  2:00 PM [facility], PFIZER 21 DAY SECOND DOSE [facility]   2021 11:40 AM Bryant Adorno MD [facility]   2021  3:45 PM Stevie Granger MD fp sc [address], [place]
98.3

## 2025-02-10 ENCOUNTER — OFFICE VISIT (OUTPATIENT)
Dept: FAMILY MEDICINE CLINIC | Age: 89
End: 2025-02-10

## 2025-02-10 VITALS
SYSTOLIC BLOOD PRESSURE: 120 MMHG | DIASTOLIC BLOOD PRESSURE: 84 MMHG | HEIGHT: 61 IN | TEMPERATURE: 97.2 F | OXYGEN SATURATION: 99 % | HEART RATE: 88 BPM | BODY MASS INDEX: 24.96 KG/M2 | WEIGHT: 132.2 LBS

## 2025-02-10 DIAGNOSIS — Z13.29 SCREENING FOR THYROID DISORDER: ICD-10-CM

## 2025-02-10 DIAGNOSIS — I12.9 BENIGN HYPERTENSION WITH CHRONIC KIDNEY DISEASE, STAGE IV (HCC): ICD-10-CM

## 2025-02-10 DIAGNOSIS — E78.5 HYPERLIPIDEMIA WITH TARGET LDL LESS THAN 100: ICD-10-CM

## 2025-02-10 DIAGNOSIS — K21.9 GASTROESOPHAGEAL REFLUX DISEASE WITHOUT ESOPHAGITIS: ICD-10-CM

## 2025-02-10 DIAGNOSIS — N18.4 CKD (CHRONIC KIDNEY DISEASE) STAGE 4, GFR 15-29 ML/MIN (HCC): ICD-10-CM

## 2025-02-10 DIAGNOSIS — E83.42 HYPOMAGNESEMIA: ICD-10-CM

## 2025-02-10 DIAGNOSIS — N18.4 ANEMIA DUE TO STAGE 4 CHRONIC KIDNEY DISEASE (HCC): ICD-10-CM

## 2025-02-10 DIAGNOSIS — N25.81 SECONDARY HYPERPARATHYROIDISM (HCC): ICD-10-CM

## 2025-02-10 DIAGNOSIS — G31.84 MCI (MILD COGNITIVE IMPAIRMENT): ICD-10-CM

## 2025-02-10 DIAGNOSIS — Z00.00 MEDICARE ANNUAL WELLNESS VISIT, SUBSEQUENT: Primary | ICD-10-CM

## 2025-02-10 DIAGNOSIS — N18.31 ANEMIA DUE TO STAGE 3A CHRONIC KIDNEY DISEASE (HCC): ICD-10-CM

## 2025-02-10 DIAGNOSIS — N18.4 BENIGN HYPERTENSION WITH CHRONIC KIDNEY DISEASE, STAGE IV (HCC): ICD-10-CM

## 2025-02-10 DIAGNOSIS — E79.0 HYPERURICEMIA: ICD-10-CM

## 2025-02-10 DIAGNOSIS — D63.1 ANEMIA DUE TO STAGE 3A CHRONIC KIDNEY DISEASE (HCC): ICD-10-CM

## 2025-02-10 DIAGNOSIS — E55.9 VITAMIN D DEFICIENCY: ICD-10-CM

## 2025-02-10 DIAGNOSIS — Z13.6 SCREENING FOR CARDIOVASCULAR CONDITION: ICD-10-CM

## 2025-02-10 DIAGNOSIS — E87.6 HYPOKALEMIA: ICD-10-CM

## 2025-02-10 DIAGNOSIS — D63.1 ANEMIA DUE TO STAGE 4 CHRONIC KIDNEY DISEASE (HCC): ICD-10-CM

## 2025-02-10 DIAGNOSIS — M1A.0721 IDIOPATHIC CHRONIC GOUT OF LEFT FOOT WITH TOPHUS: ICD-10-CM

## 2025-02-10 RX ORDER — FUROSEMIDE 20 MG/1
20 TABLET ORAL DAILY
Qty: 90 TABLET | Refills: 1 | Status: SHIPPED | OUTPATIENT
Start: 2025-02-10

## 2025-02-10 RX ORDER — ATORVASTATIN CALCIUM 20 MG/1
20 TABLET, FILM COATED ORAL NIGHTLY
Qty: 90 TABLET | Refills: 1 | Status: SHIPPED | OUTPATIENT
Start: 2025-02-10

## 2025-02-10 RX ORDER — CLONIDINE HYDROCHLORIDE 0.2 MG/1
0.2 TABLET ORAL DAILY
Qty: 90 TABLET | Refills: 1 | Status: SHIPPED | OUTPATIENT
Start: 2025-02-10

## 2025-02-10 RX ORDER — ALLOPURINOL 100 MG/1
100 TABLET ORAL DAILY
Qty: 90 TABLET | Refills: 1 | Status: SHIPPED | OUTPATIENT
Start: 2025-02-10

## 2025-02-10 RX ORDER — HYDRALAZINE HYDROCHLORIDE 25 MG/1
TABLET, FILM COATED ORAL
Qty: 270 TABLET | Refills: 1 | Status: SHIPPED | OUTPATIENT
Start: 2025-02-10

## 2025-02-10 RX ORDER — NICOTINE POLACRILEX 4 MG/1
20 GUM, CHEWING ORAL
Qty: 90 TABLET | Refills: 1 | Status: SHIPPED | OUTPATIENT
Start: 2025-02-10

## 2025-02-10 RX ORDER — IRON POLYSACCHARIDE COMPLEX 150 MG
150 CAPSULE ORAL DAILY
Qty: 180 CAPSULE | Refills: 1 | Status: SHIPPED | OUTPATIENT
Start: 2025-02-10

## 2025-02-10 SDOH — ECONOMIC STABILITY: FOOD INSECURITY: WITHIN THE PAST 12 MONTHS, THE FOOD YOU BOUGHT JUST DIDN'T LAST AND YOU DIDN'T HAVE MONEY TO GET MORE.: NEVER TRUE

## 2025-02-10 SDOH — ECONOMIC STABILITY: FOOD INSECURITY: WITHIN THE PAST 12 MONTHS, YOU WORRIED THAT YOUR FOOD WOULD RUN OUT BEFORE YOU GOT MONEY TO BUY MORE.: NEVER TRUE

## 2025-02-10 ASSESSMENT — LIFESTYLE VARIABLES
HAS A RELATIVE, FRIEND, DOCTOR, OR ANOTHER HEALTH PROFESSIONAL EXPRESSED CONCERN ABOUT YOUR DRINKING OR SUGGESTED YOU CUT DOWN: NO
HOW MANY STANDARD DRINKS CONTAINING ALCOHOL DO YOU HAVE ON A TYPICAL DAY: 1 OR 2
HOW OFTEN DURING THE LAST YEAR HAVE YOU FOUND THAT YOU WERE NOT ABLE TO STOP DRINKING ONCE YOU HAD STARTED: NEVER
HOW OFTEN DO YOU HAVE A DRINK CONTAINING ALCOHOL: 4 OR MORE TIMES A WEEK
HAVE YOU OR SOMEONE ELSE BEEN INJURED AS A RESULT OF YOUR DRINKING: NO
HOW OFTEN DURING THE LAST YEAR HAVE YOU FAILED TO DO WHAT WAS NORMALLY EXPECTED FROM YOU BECAUSE OF DRINKING: NEVER
HOW OFTEN DURING THE LAST YEAR HAVE YOU NEEDED AN ALCOHOLIC DRINK FIRST THING IN THE MORNING TO GET YOURSELF GOING AFTER A NIGHT OF HEAVY DRINKING: NEVER
HOW OFTEN DURING THE LAST YEAR HAVE YOU HAD A FEELING OF GUILT OR REMORSE AFTER DRINKING: NEVER
HOW OFTEN DURING THE LAST YEAR HAVE YOU BEEN UNABLE TO REMEMBER WHAT HAPPENED THE NIGHT BEFORE BECAUSE YOU HAD BEEN DRINKING: NEVER

## 2025-02-10 ASSESSMENT — PATIENT HEALTH QUESTIONNAIRE - PHQ9
SUM OF ALL RESPONSES TO PHQ9 QUESTIONS 1 & 2: 2
2. FEELING DOWN, DEPRESSED OR HOPELESS: SEVERAL DAYS
SUM OF ALL RESPONSES TO PHQ QUESTIONS 1-9: 2
1. LITTLE INTEREST OR PLEASURE IN DOING THINGS: SEVERAL DAYS
SUM OF ALL RESPONSES TO PHQ QUESTIONS 1-9: 2

## 2025-02-10 NOTE — PROGRESS NOTES
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  -     allopurinol (ZYLOPRIM) 100 MG tablet; Take 1 tablet by mouth daily, Disp-90 tablet, R-1Normal  Hyperlipidemia with target LDL less than 100  -     atorvastatin (LIPITOR) 20 MG tablet; Take 1 tablet by mouth nightly, Disp-90 tablet, R-1Normal  -     Comprehensive Metabolic Panel; Future  -     Lipid, Fasting; Future  Benign hypertension with chronic kidney disease, stage IV (HCC)  -     cloNIDine (CATAPRES) 0.2 MG tablet; Take 1 tablet by mouth daily, Disp-90 tablet, R-1Normal  -     furosemide (LASIX) 20 MG tablet; Take 1 tablet by mouth daily, Disp-90 tablet, R-1Normal  -     hydrALAZINE (APRESOLINE) 25 MG tablet; TAKE 1 TABLET THREE TIMES A DAY, Disp-270 tablet, R-1Normal  -     Comprehensive Metabolic Panel; Future  Anemia due to stage 3a chronic kidney disease (HCC)  -     iron polysaccharides (NIFEREX) 150 MG capsule; Take 1 capsule by mouth daily, Disp-180 capsule, R-1Normal  -     CBC with Auto Differential; Future  -     Iron and TIBC; Future  -     Ferritin; Future  -     Vitamin B12 & Folate; Future  Anemia due to stage 4 chronic kidney disease (HCC)  -     iron polysaccharides (NIFEREX) 150 MG capsule; Take 1 capsule by mouth daily, Disp-180 capsule, R-1Normal  -     CBC with Auto Differential; Future  -     Iron and TIBC; Future  -     Ferritin; Future  -     Vitamin B12 & Folate; Future  Hypomagnesemia  -     Magnesium Oxide (MAGNESIUM-OXIDE) 250 MG TABS tablet; Take 1 tablet by mouth 2 times daily, Disp-180 tablet, R-1Normal  -     Magnesium; Future  Gastroesophageal reflux disease without esophagitis  -     omeprazole 20 MG EC tablet; Take 1 tablet by mouth every morning (before breakfast), Disp-90 tablet, R-1Normal  Secondary hyperparathyroidism (HCC)  CKD (chronic kidney disease) stage

## 2025-02-12 ENCOUNTER — HOSPITAL ENCOUNTER (OUTPATIENT)
Age: 89
Discharge: HOME | End: 2025-02-12
Payer: MEDICARE

## 2025-02-12 DIAGNOSIS — E78.5 HYPERLIPIDEMIA WITH TARGET LDL LESS THAN 100: ICD-10-CM

## 2025-02-12 DIAGNOSIS — E87.6 HYPOKALEMIA: ICD-10-CM

## 2025-02-12 DIAGNOSIS — N18.4 ANEMIA DUE TO STAGE 4 CHRONIC KIDNEY DISEASE (HCC): ICD-10-CM

## 2025-02-12 DIAGNOSIS — E83.42 HYPOMAGNESEMIA: ICD-10-CM

## 2025-02-12 DIAGNOSIS — N18.4 BENIGN HYPERTENSION WITH CHRONIC KIDNEY DISEASE, STAGE IV (HCC): ICD-10-CM

## 2025-02-12 DIAGNOSIS — N18.31 ANEMIA DUE TO STAGE 3A CHRONIC KIDNEY DISEASE (HCC): ICD-10-CM

## 2025-02-12 DIAGNOSIS — N18.4 CKD (CHRONIC KIDNEY DISEASE) STAGE 4, GFR 15-29 ML/MIN (HCC): ICD-10-CM

## 2025-02-12 DIAGNOSIS — E79.0 HYPERURICEMIA: ICD-10-CM

## 2025-02-12 DIAGNOSIS — I12.9 BENIGN HYPERTENSION WITH CHRONIC KIDNEY DISEASE, STAGE IV (HCC): ICD-10-CM

## 2025-02-12 DIAGNOSIS — D63.1 ANEMIA DUE TO STAGE 3A CHRONIC KIDNEY DISEASE (HCC): ICD-10-CM

## 2025-02-12 DIAGNOSIS — E55.9 VITAMIN D DEFICIENCY: ICD-10-CM

## 2025-02-12 DIAGNOSIS — Z13.29 SCREENING FOR THYROID DISORDER: ICD-10-CM

## 2025-02-12 DIAGNOSIS — D63.1 ANEMIA DUE TO STAGE 4 CHRONIC KIDNEY DISEASE (HCC): ICD-10-CM

## 2025-02-12 LAB
25(OH)D3 SERPL-MCNC: 32.2 NG/ML (ref 30–100)
ALBUMIN SERPL-MCNC: 4.3 G/DL (ref 3.5–5.2)
ALP SERPL-CCNC: 119 U/L (ref 35–104)
ALT SERPL-CCNC: 7 U/L (ref 10–35)
ANION GAP SERPL CALCULATED.3IONS-SCNC: 14 MMOL/L (ref 9–16)
AST SERPL-CCNC: 28 U/L (ref 10–35)
BASOPHILS # BLD: 0.1 K/UL (ref 0–0.2)
BASOPHILS NFR BLD: 1 % (ref 0–2)
BILIRUB SERPL-MCNC: 0.9 MG/DL (ref 0–1.2)
BUN SERPL-MCNC: 44 MG/DL (ref 8–23)
CALCIUM SERPL-MCNC: 9.3 MG/DL (ref 8.6–10.4)
CHLORIDE SERPL-SCNC: 102 MMOL/L (ref 98–107)
CHOLEST SERPL-MCNC: 136 MG/DL (ref 0–199)
CHOLESTEROL/HDL RATIO: 1.7
CO2 SERPL-SCNC: 24 MMOL/L (ref 20–31)
CREAT SERPL-MCNC: 1.9 MG/DL (ref 0.7–1.2)
EOSINOPHIL # BLD: 0.2 K/UL (ref 0–0.4)
EOSINOPHILS RELATIVE PERCENT: 5 % (ref 0–4)
ERYTHROCYTE [DISTWIDTH] IN BLOOD BY AUTOMATED COUNT: 17.3 % (ref 11.5–14.9)
FERRITIN SERPL-MCNC: 251 NG/ML
FOLATE SERPL-MCNC: 13 NG/ML (ref 4.8–24.2)
GFR, ESTIMATED: 25 ML/MIN/1.73M2
GLUCOSE SERPL-MCNC: 104 MG/DL (ref 74–99)
HCT VFR BLD AUTO: 34.2 % (ref 36–46)
HDLC SERPL-MCNC: 79 MG/DL
HGB BLD-MCNC: 11.1 G/DL (ref 12–16)
IRON SATN MFR SERPL: 41 % (ref 20–55)
IRON SERPL-MCNC: 104 UG/DL (ref 37–145)
LDLC SERPL CALC-MCNC: 43 MG/DL (ref 0–100)
LYMPHOCYTES NFR BLD: 2.1 K/UL (ref 1–4.8)
LYMPHOCYTES RELATIVE PERCENT: 42 % (ref 24–44)
MAGNESIUM SERPL-MCNC: 2.5 MG/DL (ref 1.6–2.4)
MCH RBC QN AUTO: 32.5 PG (ref 26–34)
MCHC RBC AUTO-ENTMCNC: 32.4 G/DL (ref 31–37)
MCV RBC AUTO: 100.2 FL (ref 80–100)
MONOCYTES NFR BLD: 0.5 K/UL (ref 0.1–1.3)
MONOCYTES NFR BLD: 10 % (ref 1–7)
NEUTROPHILS NFR BLD: 42 % (ref 36–66)
NEUTS SEG NFR BLD: 2.1 K/UL (ref 1.3–9.1)
PHOSPHATE SERPL-MCNC: 3.1 MG/DL (ref 2.5–4.5)
PLATELET # BLD AUTO: 339 K/UL (ref 150–450)
PMV BLD AUTO: 8.3 FL (ref 6–12)
POTASSIUM SERPL-SCNC: 4.2 MMOL/L (ref 3.7–5.3)
PROT SERPL-MCNC: 7 G/DL (ref 6.6–8.7)
RBC # BLD AUTO: 3.41 M/UL (ref 4–5.2)
SODIUM SERPL-SCNC: 140 MMOL/L (ref 136–145)
T4 FREE SERPL-MCNC: 1.5 NG/DL (ref 0.9–1.7)
TIBC SERPL-MCNC: 256 UG/DL (ref 250–450)
TRIGL SERPL-MCNC: 70 MG/DL (ref 0–149)
TSH SERPL DL<=0.05 MIU/L-ACNC: 6.21 UIU/ML (ref 0.27–4.2)
UNSATURATED IRON BINDING CAPACITY: 152 UG/DL (ref 112–347)
URATE SERPL-MCNC: 5.1 MG/DL (ref 2.4–5.7)
VIT B12 SERPL-MCNC: 430 PG/ML (ref 232–1245)
WBC OTHER # BLD: 4.9 K/UL (ref 3.5–11)

## 2025-02-12 PROCEDURE — 84439 ASSAY OF FREE THYROXINE: CPT

## 2025-02-12 PROCEDURE — 84443 ASSAY THYROID STIM HORMONE: CPT

## 2025-02-12 PROCEDURE — 84550 ASSAY OF BLOOD/URIC ACID: CPT

## 2025-02-12 PROCEDURE — 80053 COMPREHEN METABOLIC PANEL: CPT

## 2025-02-12 PROCEDURE — 82607 VITAMIN B-12: CPT

## 2025-02-12 PROCEDURE — 83540 ASSAY OF IRON: CPT

## 2025-02-12 PROCEDURE — 82728 ASSAY OF FERRITIN: CPT

## 2025-02-12 PROCEDURE — 82746 ASSAY OF FOLIC ACID SERUM: CPT

## 2025-02-12 PROCEDURE — 80061 LIPID PANEL: CPT

## 2025-02-12 PROCEDURE — 82306 VITAMIN D 25 HYDROXY: CPT

## 2025-02-12 PROCEDURE — 83550 IRON BINDING TEST: CPT

## 2025-02-12 PROCEDURE — 36415 COLL VENOUS BLD VENIPUNCTURE: CPT

## 2025-02-12 PROCEDURE — 84100 ASSAY OF PHOSPHORUS: CPT

## 2025-02-12 PROCEDURE — 83735 ASSAY OF MAGNESIUM: CPT

## 2025-02-12 PROCEDURE — 85025 COMPLETE CBC W/AUTO DIFF WBC: CPT

## 2025-02-16 DIAGNOSIS — R94.6 ABNORMAL RESULTS OF THYROID FUNCTION STUDIES: ICD-10-CM

## 2025-02-16 DIAGNOSIS — R79.89 ABNORMAL THYROID BLOOD TEST: Primary | ICD-10-CM

## 2025-02-16 DIAGNOSIS — N18.4 CKD STAGE 4 SECONDARY TO HYPERTENSION (HCC): ICD-10-CM

## 2025-02-16 DIAGNOSIS — I12.9 CKD STAGE 4 SECONDARY TO HYPERTENSION (HCC): ICD-10-CM

## 2025-08-22 DIAGNOSIS — N18.4 BENIGN HYPERTENSION WITH CHRONIC KIDNEY DISEASE, STAGE IV (HCC): ICD-10-CM

## 2025-08-22 DIAGNOSIS — I12.9 BENIGN HYPERTENSION WITH CHRONIC KIDNEY DISEASE, STAGE IV (HCC): ICD-10-CM

## 2025-08-22 DIAGNOSIS — E78.5 HYPERLIPIDEMIA WITH TARGET LDL LESS THAN 100: ICD-10-CM

## 2025-08-26 RX ORDER — ATORVASTATIN CALCIUM 20 MG/1
20 TABLET, FILM COATED ORAL NIGHTLY
Qty: 90 TABLET | Refills: 3 | Status: SHIPPED | OUTPATIENT
Start: 2025-08-26

## 2025-08-26 RX ORDER — HYDRALAZINE HYDROCHLORIDE 25 MG/1
25 TABLET, FILM COATED ORAL 3 TIMES DAILY
Qty: 270 TABLET | Refills: 3 | Status: SHIPPED | OUTPATIENT
Start: 2025-08-26